# Patient Record
Sex: FEMALE | Race: WHITE | NOT HISPANIC OR LATINO | Employment: OTHER | URBAN - METROPOLITAN AREA
[De-identification: names, ages, dates, MRNs, and addresses within clinical notes are randomized per-mention and may not be internally consistent; named-entity substitution may affect disease eponyms.]

---

## 2023-04-05 LAB
ALBUMIN (G/DL) IN SER/PLAS: 3.7 G/DL (ref 3.4–5)
ANION GAP IN SER/PLAS: 12 MMOL/L (ref 10–20)
CALCIUM (MG/DL) IN SER/PLAS: 9.9 MG/DL (ref 8.6–10.3)
CARBON DIOXIDE, TOTAL (MMOL/L) IN SER/PLAS: 24 MMOL/L (ref 21–32)
CHLORIDE (MMOL/L) IN SER/PLAS: 103 MMOL/L (ref 98–107)
CREATININE (MG/DL) IN SER/PLAS: 2.45 MG/DL (ref 0.5–1.05)
ERYTHROCYTE DISTRIBUTION WIDTH (RATIO) BY AUTOMATED COUNT: 12.9 % (ref 11.5–14.5)
ERYTHROCYTE MEAN CORPUSCULAR HEMOGLOBIN CONCENTRATION (G/DL) BY AUTOMATED: 34.6 G/DL (ref 32–36)
ERYTHROCYTE MEAN CORPUSCULAR VOLUME (FL) BY AUTOMATED COUNT: 94 FL (ref 80–100)
ERYTHROCYTES (10*6/UL) IN BLOOD BY AUTOMATED COUNT: 3.55 X10E12/L (ref 4–5.2)
FERRITIN (UG/LL) IN SER/PLAS: 86 UG/L (ref 8–150)
GFR FEMALE: 20 ML/MIN/1.73M2
GLUCOSE (MG/DL) IN SER/PLAS: 94 MG/DL (ref 74–99)
HEMATOCRIT (%) IN BLOOD BY AUTOMATED COUNT: 33.2 % (ref 36–46)
HEMOGLOBIN (G/DL) IN BLOOD: 11.5 G/DL (ref 12–16)
IRON (UG/DL) IN SER/PLAS: 59 UG/DL (ref 35–150)
IRON BINDING CAPACITY (UG/DL) IN SER/PLAS: 320 UG/DL (ref 240–445)
IRON SATURATION (%) IN SER/PLAS: 18 % (ref 25–45)
LEUKOCYTES (10*3/UL) IN BLOOD BY AUTOMATED COUNT: 6.3 X10E9/L (ref 4.4–11.3)
PHOSPHATE (MG/DL) IN SER/PLAS: 3.1 MG/DL (ref 2.5–4.9)
PLATELETS (10*3/UL) IN BLOOD AUTOMATED COUNT: 183 X10E9/L (ref 150–450)
POTASSIUM (MMOL/L) IN SER/PLAS: 3.8 MMOL/L (ref 3.5–5.3)
SODIUM (MMOL/L) IN SER/PLAS: 135 MMOL/L (ref 136–145)
URATE (MG/DL) IN SER/PLAS: 4.1 MG/DL (ref 2.3–6.7)
UREA NITROGEN (MG/DL) IN SER/PLAS: 29 MG/DL (ref 6–23)

## 2023-04-13 LAB
ALBUMIN (MG/L) IN URINE: 9.7 MG/L
ALBUMIN/CREATININE (UG/MG) IN URINE: 12.2 UG/MG CRT (ref 0–30)
APPEARANCE, URINE: ABNORMAL
BACTERIA, URINE: ABNORMAL /HPF
BILIRUBIN, URINE: NEGATIVE
BLOOD, URINE: ABNORMAL
COLOR, URINE: YELLOW
CREATININE (MG/DL) IN URINE: 79.4 MG/DL (ref 20–320)
GLUCOSE, URINE: NEGATIVE MG/DL
KETONES, URINE: NEGATIVE MG/DL
LEUKOCYTE ESTERASE, URINE: ABNORMAL
MUCUS, URINE: ABNORMAL /LPF
NITRITE, URINE: POSITIVE
PH, URINE: 6 (ref 5–8)
PROTEIN, URINE: NEGATIVE MG/DL
RBC, URINE: 1 /HPF (ref 0–5)
SPECIFIC GRAVITY, URINE: 1.01 (ref 1–1.03)
SQUAMOUS EPITHELIAL CELLS, URINE: 5 /HPF
UROBILINOGEN, URINE: <2 MG/DL (ref 0–1.9)
WBC CLUMPS, URINE: ABNORMAL /HPF
WBC, URINE: 43 /HPF (ref 0–5)

## 2023-04-17 ENCOUNTER — PATIENT OUTREACH (OUTPATIENT)
Dept: PRIMARY CARE | Facility: CLINIC | Age: 78
End: 2023-04-17
Payer: MEDICARE

## 2023-04-17 DIAGNOSIS — L03.119 CELLULITIS OF LOWER EXTREMITY, UNSPECIFIED LATERALITY: ICD-10-CM

## 2023-04-17 DIAGNOSIS — N39.0 UTI (URINARY TRACT INFECTION), UNCOMPLICATED: ICD-10-CM

## 2023-04-17 RX ORDER — SULFAMETHOXAZOLE AND TRIMETHOPRIM 800; 160 MG/1; MG/1
1 TABLET ORAL 2 TIMES DAILY
COMMUNITY
Start: 2022-08-30 | End: 2023-04-20 | Stop reason: ALTCHOICE

## 2023-04-17 RX ORDER — FUROSEMIDE 20 MG/1
20 TABLET ORAL
COMMUNITY
Start: 2022-04-25 | End: 2024-02-26 | Stop reason: SDUPTHER

## 2023-04-17 NOTE — PROGRESS NOTES
Discharge Facility: [facility]   Discharge Diagnosis: L lower extremity cellulitis, UTI(1)   Stage 4 chronic kidney disease   Admission Date: 04/13/23  Discharge Date: 04/14/23    PCP appt: 04/20/23   Engagement  Call Start Time: 1248 (4/17/2023 12:48 PM)    Medications  Medications reviewed with patient/caregiver?: Yes (new/change only) (4/17/2023 12:48 PM)  Is the patient having any side effects they believe may be caused by any medication additions or changes?: No (4/17/2023 12:48 PM)  Does the patient have all medications ordered at discharge?: Yes (4/17/2023 12:48 PM)  Care Management Interventions: No intervention needed (4/17/2023 12:48 PM)  Is the patient taking all medications as directed (includes completed medication regime)?: Yes (4/17/2023 12:48 PM)    Appointments  Does the patient have a primary care provider?: Yes (4/17/2023 12:48 PM)  Care Management Interventions: Verified appointment date/time/provider (4/17/2023 12:48 PM)  Has the patient kept scheduled appointments due by today?: Yes (4/17/2023 12:48 PM)  Care Management Interventions: Advised patient to keep appointment (4/17/2023 12:48 PM)    Self Management  Has home health visited the patient within 72 hours of discharge?: Not applicable (4/17/2023 12:48 PM)    Patient Teaching  Does the patient have access to their discharge instructions?: Yes (4/17/2023 12:48 PM)  Care Management Interventions: Reviewed instructions with patient (4/17/2023 12:48 PM)  What is the patient's perception of their health status since discharge?: Improving (4/17/2023 12:48 PM)    Wrap Up  Call End Time: 1257 (4/17/2023 12:48 PM)

## 2023-04-20 ENCOUNTER — OFFICE VISIT (OUTPATIENT)
Dept: PRIMARY CARE | Facility: CLINIC | Age: 78
End: 2023-04-20
Payer: MEDICARE

## 2023-04-20 ENCOUNTER — PATIENT OUTREACH (OUTPATIENT)
Dept: PRIMARY CARE | Facility: CLINIC | Age: 78
End: 2023-04-20

## 2023-04-20 VITALS
HEART RATE: 65 BPM | RESPIRATION RATE: 18 BRPM | WEIGHT: 240 LBS | BODY MASS INDEX: 38.57 KG/M2 | OXYGEN SATURATION: 98 % | DIASTOLIC BLOOD PRESSURE: 60 MMHG | HEIGHT: 66 IN | SYSTOLIC BLOOD PRESSURE: 106 MMHG

## 2023-04-20 DIAGNOSIS — J44.1 CHRONIC OBSTRUCTIVE ASTHMA WITH ACUTE EXACERBATION (MULTI): ICD-10-CM

## 2023-04-20 DIAGNOSIS — Z99.2 ESRD (END STAGE RENAL DISEASE) ON DIALYSIS (MULTI): ICD-10-CM

## 2023-04-20 DIAGNOSIS — I12.9 BENIGN HYPERTENSION WITH CKD (CHRONIC KIDNEY DISEASE) STAGE IV (MULTI): ICD-10-CM

## 2023-04-20 DIAGNOSIS — Z09 HOSPITAL DISCHARGE FOLLOW-UP: ICD-10-CM

## 2023-04-20 DIAGNOSIS — N18.4 BENIGN HYPERTENSION WITH CKD (CHRONIC KIDNEY DISEASE) STAGE IV (MULTI): ICD-10-CM

## 2023-04-20 DIAGNOSIS — N32.81 OAB (OVERACTIVE BLADDER): ICD-10-CM

## 2023-04-20 DIAGNOSIS — I10 PRIMARY HYPERTENSION: Primary | ICD-10-CM

## 2023-04-20 DIAGNOSIS — J45.909 UNCOMPLICATED ASTHMA, UNSPECIFIED ASTHMA SEVERITY, UNSPECIFIED WHETHER PERSISTENT (HHS-HCC): ICD-10-CM

## 2023-04-20 DIAGNOSIS — J45.901 CHRONIC OBSTRUCTIVE ASTHMA WITH ACUTE EXACERBATION (MULTI): ICD-10-CM

## 2023-04-20 DIAGNOSIS — I73.9 PAD (PERIPHERAL ARTERY DISEASE) (CMS-HCC): ICD-10-CM

## 2023-04-20 DIAGNOSIS — B37.0 THRUSH, ORAL: ICD-10-CM

## 2023-04-20 DIAGNOSIS — N18.6 ESRD (END STAGE RENAL DISEASE) ON DIALYSIS (MULTI): ICD-10-CM

## 2023-04-20 PROBLEM — M10.9 URIC ACID ARTHROPATHY: Status: ACTIVE | Noted: 2023-04-20

## 2023-04-20 PROBLEM — B35.1 MYCOTIC TOENAILS: Status: ACTIVE | Noted: 2023-04-20

## 2023-04-20 PROBLEM — R39.9 LOWER URINARY TRACT SYMPTOMS: Status: RESOLVED | Noted: 2023-04-20 | Resolved: 2023-04-20

## 2023-04-20 PROBLEM — M17.11 PRIMARY OSTEOARTHRITIS OF RIGHT KNEE: Status: RESOLVED | Noted: 2023-04-20 | Resolved: 2023-04-20

## 2023-04-20 PROBLEM — E78.01 FAMILIAL HYPERCHOLESTEREMIA: Status: RESOLVED | Noted: 2023-04-20 | Resolved: 2023-04-20

## 2023-04-20 PROBLEM — M25.561 RIGHT KNEE PAIN: Status: RESOLVED | Noted: 2023-04-20 | Resolved: 2023-04-20

## 2023-04-20 PROBLEM — M79.604 BILATERAL LEG AND FOOT PAIN: Status: RESOLVED | Noted: 2023-04-20 | Resolved: 2023-04-20

## 2023-04-20 PROBLEM — E66.01 MORBID OBESITY (MULTI): Status: RESOLVED | Noted: 2023-04-20 | Resolved: 2023-04-20

## 2023-04-20 PROBLEM — K21.9 GERD (GASTROESOPHAGEAL REFLUX DISEASE): Status: RESOLVED | Noted: 2023-04-20 | Resolved: 2023-04-20

## 2023-04-20 PROBLEM — H69.93 DISORDER OF BOTH EUSTACHIAN TUBES: Status: RESOLVED | Noted: 2023-04-20 | Resolved: 2023-04-20

## 2023-04-20 PROBLEM — M79.605 BILATERAL LEG AND FOOT PAIN: Status: RESOLVED | Noted: 2023-04-20 | Resolved: 2023-04-20

## 2023-04-20 PROBLEM — M79.671 BILATERAL LEG AND FOOT PAIN: Status: RESOLVED | Noted: 2023-04-20 | Resolved: 2023-04-20

## 2023-04-20 PROBLEM — E03.9 HYPOTHYROID: Status: RESOLVED | Noted: 2023-04-20 | Resolved: 2023-04-20

## 2023-04-20 PROBLEM — N18.9 CKD (CHRONIC KIDNEY DISEASE): Status: ACTIVE | Noted: 2023-04-20

## 2023-04-20 PROBLEM — T39.395S: Status: RESOLVED | Noted: 2023-04-20 | Resolved: 2023-04-20

## 2023-04-20 PROBLEM — M79.672 BILATERAL LEG AND FOOT PAIN: Status: RESOLVED | Noted: 2023-04-20 | Resolved: 2023-04-20

## 2023-04-20 PROBLEM — N39.0 ACUTE UTI: Status: RESOLVED | Noted: 2023-04-20 | Resolved: 2023-04-20

## 2023-04-20 PROCEDURE — 1159F MED LIST DOCD IN RCRD: CPT | Performed by: NURSE PRACTITIONER

## 2023-04-20 PROCEDURE — 3074F SYST BP LT 130 MM HG: CPT | Performed by: NURSE PRACTITIONER

## 2023-04-20 PROCEDURE — 99496 TRANSJ CARE MGMT HIGH F2F 7D: CPT | Performed by: NURSE PRACTITIONER

## 2023-04-20 PROCEDURE — 3078F DIAST BP <80 MM HG: CPT | Performed by: NURSE PRACTITIONER

## 2023-04-20 PROCEDURE — 1160F RVW MEDS BY RX/DR IN RCRD: CPT | Performed by: NURSE PRACTITIONER

## 2023-04-20 PROCEDURE — 1036F TOBACCO NON-USER: CPT | Performed by: NURSE PRACTITIONER

## 2023-04-20 RX ORDER — ATENOLOL 100 MG/1
100 TABLET ORAL DAILY
Qty: 90 TABLET | Refills: 3 | Status: ON HOLD | OUTPATIENT
Start: 2023-04-20 | End: 2024-04-12

## 2023-04-20 RX ORDER — ALBUTEROL SULFATE 90 UG/1
2 AEROSOL, METERED RESPIRATORY (INHALATION) DAILY PRN
Qty: 18 G | Refills: 3 | Status: SHIPPED | OUTPATIENT
Start: 2023-04-20 | End: 2024-04-13 | Stop reason: HOSPADM

## 2023-04-20 RX ORDER — FEBUXOSTAT 40 MG/1
40 TABLET, FILM COATED ORAL EVERY EVENING
COMMUNITY

## 2023-04-20 RX ORDER — FLUCONAZOLE 100 MG/1
100 TABLET ORAL DAILY
Qty: 7 TABLET | Refills: 0 | Status: SHIPPED | OUTPATIENT
Start: 2023-04-20 | End: 2023-04-27

## 2023-04-20 RX ORDER — ATORVASTATIN CALCIUM 20 MG/1
20 TABLET, FILM COATED ORAL DAILY
COMMUNITY
Start: 2013-07-09 | End: 2023-07-12

## 2023-04-20 RX ORDER — FERROUS GLUCONATE 324(38)MG
1 TABLET ORAL 2 TIMES DAILY
Status: ON HOLD | COMMUNITY
Start: 2023-04-13 | End: 2024-04-05

## 2023-04-20 RX ORDER — ERGOCALCIFEROL 1.25 MG/1
1 CAPSULE ORAL WEEKLY
COMMUNITY
Start: 2021-08-25 | End: 2024-01-04 | Stop reason: SDUPTHER

## 2023-04-20 RX ORDER — OXYBUTYNIN CHLORIDE 5 MG/1
1 TABLET ORAL 2 TIMES DAILY
COMMUNITY
Start: 2021-07-21 | End: 2023-04-20 | Stop reason: SDUPTHER

## 2023-04-20 RX ORDER — FAMOTIDINE 20 MG/1
20 TABLET, FILM COATED ORAL NIGHTLY
COMMUNITY
End: 2023-11-08 | Stop reason: SDUPTHER

## 2023-04-20 RX ORDER — OXYBUTYNIN CHLORIDE 5 MG/1
5 TABLET ORAL 2 TIMES DAILY
Qty: 180 TABLET | Refills: 3 | Status: SHIPPED | OUTPATIENT
Start: 2023-04-20 | End: 2023-11-08 | Stop reason: ALTCHOICE

## 2023-04-20 RX ORDER — ATENOLOL 100 MG/1
100 TABLET ORAL DAILY
COMMUNITY
Start: 2013-07-09 | End: 2023-04-20 | Stop reason: SDUPTHER

## 2023-04-20 RX ORDER — BUDESONIDE AND FORMOTEROL FUMARATE DIHYDRATE 160; 4.5 UG/1; UG/1
2 AEROSOL RESPIRATORY (INHALATION) DAILY
Qty: 1 EACH | Refills: 3 | Status: SHIPPED | OUTPATIENT
Start: 2023-04-20

## 2023-04-20 RX ORDER — BUDESONIDE AND FORMOTEROL FUMARATE DIHYDRATE 160; 4.5 UG/1; UG/1
2 AEROSOL RESPIRATORY (INHALATION)
COMMUNITY
End: 2023-04-20 | Stop reason: SDUPTHER

## 2023-04-20 RX ORDER — ALBUTEROL SULFATE 90 UG/1
2 AEROSOL, METERED RESPIRATORY (INHALATION) DAILY PRN
COMMUNITY
End: 2023-04-20 | Stop reason: SDUPTHER

## 2023-04-20 RX ORDER — LEVOTHYROXINE SODIUM 75 UG/1
1 TABLET ORAL DAILY
COMMUNITY
Start: 2016-10-10 | End: 2023-06-15 | Stop reason: SDUPTHER

## 2023-04-20 NOTE — PROGRESS NOTES
"Subjective   Patient ID: Magi Boyd is a 78 y.o. female who presents for Hospital Follow-up (Patient in today for Hospital F/U, states she had infection in her leg and UTI. She is currently on Bactrim which she believes is causing her diarrhea.).    Following up for admission to [facility] for E cellulitis and UTI.  Was at nephrology and had swelling and redness to LLE.  RLE was normal.  Recommended to go to ER.  Negative for DVT  She was kept for observation due to her inability to walk and they needed to give her increased furosemide  Had clindamycin and rocephin  Was treated for UTI  Has been taking bactrim - last dose tonight       Needs hospital bed at home - has difficulty getting into bed.  Has to sleep with the head of the bed elevated due to her breathing.  She is unable to turn herself over in bed due to her weak legs and pain in her knees - will need a trapeze         Review of Systems   All other systems reviewed and are negative.      Objective   /60   Pulse 65   Resp 18   Ht 1.676 m (5' 6\")   Wt 109 kg (240 lb)   SpO2 98%   BMI 38.74 kg/m²     Physical Exam  Vitals reviewed.   Constitutional:       General: She is not in acute distress.     Appearance: Normal appearance. She is obese.   HENT:      Head: Normocephalic and atraumatic.      Right Ear: External ear normal.      Left Ear: External ear normal.      Nose: Nose normal.      Mouth/Throat:      Mouth: Mucous membranes are moist.      Pharynx: Oropharynx is clear.   Eyes:      Extraocular Movements: Extraocular movements intact.      Conjunctiva/sclera: Conjunctivae normal.      Pupils: Pupils are equal, round, and reactive to light.   Neck:      Vascular: No carotid bruit.   Cardiovascular:      Rate and Rhythm: Normal rate and regular rhythm.      Pulses: Normal pulses.      Heart sounds: Normal heart sounds.   Pulmonary:      Effort: Pulmonary effort is normal.      Breath sounds: Normal breath sounds.   Abdominal:      " General: Bowel sounds are normal.      Palpations: Abdomen is soft.      Tenderness: There is no right CVA tenderness or left CVA tenderness.   Musculoskeletal:      Cervical back: Normal range of motion.      Right lower leg: Edema (1+) present.      Left lower leg: Edema (1+) present.   Skin:     General: Skin is warm.      Capillary Refill: Capillary refill takes less than 2 seconds.   Neurological:      General: No focal deficit present.      Mental Status: She is alert and oriented to person, place, and time. Mental status is at baseline.   Psychiatric:         Mood and Affect: Mood normal.         Behavior: Behavior normal.         Thought Content: Thought content normal.         Judgment: Judgment normal.         Assessment/Plan   Problem List Items Addressed This Visit          Respiratory    Asthma    Relevant Medications    budesonide-formoteroL (Symbicort) 160-4.5 mcg/actuation inhaler    albuterol 90 mcg/actuation inhaler     Other Visit Diagnoses       Primary hypertension    -  Primary    Relevant Medications    atenolol (Tenormin) 100 mg tablet    Thrush, oral        Relevant Medications    fluconazole (Diflucan) 100 mg tablet    OAB (overactive bladder)        Relevant Medications    oxybutynin (Ditropan) 5 mg tablet

## 2023-04-20 NOTE — PROGRESS NOTES
Call regarding appt. with PCP on 4/20/23 after hospitalization.  At time of outreach call the patient feels as if their condition has improved since last outreach call.  Reviewed with patient the PCP appointment and any questions or concerns regarding the appt.

## 2023-04-21 ASSESSMENT — ENCOUNTER SYMPTOMS
OCCASIONAL FEELINGS OF UNSTEADINESS: 1
LOSS OF SENSATION IN FEET: 0

## 2023-04-21 NOTE — PROGRESS NOTES
"Patient: Magi Boyd  : 1945  PCP: Ofe Hunter, APRN-CNP  MRN: [id number]  Program: No linked episodes     Maig Boyd is a 78 y.o. female presenting today for follow-up after being discharged from the hospital 6 days ago. The main problem requiring admission was lower extremity edema, redness and pain.  Possible DVT ruled out.  Positive for cellulitis and UTI. Has had white painful rash in mouth x 2 days The discharge summary and/or Transitional Care Management documentation was reviewed. Medication reconciliation was performed as indicated via the \"Sebastian as Reviewed\" timestamp.     Magi Boyd was contacted by Transitional Care Management services two days after her discharge. This encounter and supporting documentation was reviewed.    The complexity of medical decision making for this patient's transitional care is high.    Physical Exam  Vitals reviewed.   Constitutional:       Appearance: Normal appearance. She is obese.   HENT:      Head: Normocephalic.      Right Ear: External ear normal.      Left Ear: External ear normal.      Mouth/Throat:      Mouth: Mucous membranes are moist.      Comments: thrush  Eyes:      Extraocular Movements: Extraocular movements intact.      Conjunctiva/sclera: Conjunctivae normal.      Pupils: Pupils are equal, round, and reactive to light.   Neck:      Vascular: No carotid bruit.   Cardiovascular:      Rate and Rhythm: Normal rate and regular rhythm.      Pulses: Normal pulses.      Heart sounds: Normal heart sounds.   Pulmonary:      Effort: Pulmonary effort is normal.      Breath sounds: Normal breath sounds.   Abdominal:      General: Bowel sounds are normal.   Musculoskeletal:      Right lower leg: No edema.      Left lower leg: No edema.   Lymphadenopathy:      Cervical: No cervical adenopathy.   Skin:     General: Skin is warm.      Comments: BLE red     Neurological:      General: No focal deficit present.      Mental Status: She is " alert and oriented to person, place, and time. Mental status is at baseline.   Psychiatric:         Mood and Affect: Mood normal.         Behavior: Behavior normal.         Thought Content: Thought content normal.         Judgment: Judgment normal.         Assessment/Plan   Problem List Items Addressed This Visit          Respiratory    Asthma    Relevant Medications    budesonide-formoteroL (Symbicort) 160-4.5 mcg/actuation inhaler    albuterol 90 mcg/actuation inhaler    Chronic obstructive asthma with acute exacerbation (CMS/[facility])     Following nephrology  Continue furosemide  Continue atenolol  Follow up in 4 months            Circulatory    Benign hypertension with CKD (chronic kidney disease) stage IV (CMS/[facility])     Following nephrology  Continue furosemide  Continue atenolol  Follow up in 4 months         Primary hypertension - Primary    Relevant Medications    atenolol (Tenormin) 100 mg tablet    PAD (peripheral artery disease) (CMS/[facility])     Follows vascular medicine  stable            Genitourinary    ESRD (end stage renal disease) on dialysis (CMS/[facility])     Following nephrology            Infectious/Inflammatory    Thrush, oral     Thrush due to IV antibiotics and oral antibiotics  Fluconazole 100 mg daily x 1 week            Other    Hospital discharge follow-up     Reviewed hospital notes   Reviewed UA  Completed 5 days of keflex - will have her complete 5 more as she was febrile and had large leuco  Follow up with nephrology as sheduled          Other Visit Diagnoses       OAB (overactive bladder)        Relevant Medications    oxybutynin (Ditropan) 5 mg tablet            Review of Systems   All other systems reviewed and are negative.      No family history on file.    Engagement  Call Start Time: 1248 (4/17/2023 12:48 PM)    Medications  Medications reviewed with patient/caregiver?: Yes (new/change only) (4/17/2023 12:48 PM)  Is the patient having any side effects they believe may be caused by any  medication additions or changes?: No (4/17/2023 12:48 PM)  Does the patient have all medications ordered at discharge?: Yes (4/17/2023 12:48 PM)  Care Management Interventions: No intervention needed (4/17/2023 12:48 PM)  Is the patient taking all medications as directed (includes completed medication regime)?: Yes (4/17/2023 12:48 PM)    Appointments  Does the patient have a primary care provider?: Yes (4/17/2023 12:48 PM)  Care Management Interventions: Verified appointment date/time/provider (4/17/2023 12:48 PM)  Has the patient kept scheduled appointments due by today?: Yes (4/17/2023 12:48 PM)  Care Management Interventions: Advised patient to keep appointment (4/17/2023 12:48 PM)    Self Management  Has home health visited the patient within 72 hours of discharge?: Not applicable (4/17/2023 12:48 PM)    Patient Teaching  Does the patient have access to their discharge instructions?: Yes (4/17/2023 12:48 PM)  Care Management Interventions: Reviewed instructions with patient (4/17/2023 12:48 PM)  What is the patient's perception of their health status since discharge?: Improving (4/17/2023 12:48 PM)    Wrap Up  Call End Time: 1257 (4/17/2023 12:48 PM)        No follow-ups on file.

## 2023-04-30 PROBLEM — N18.9 CKD (CHRONIC KIDNEY DISEASE): Status: RESOLVED | Noted: 2023-04-20 | Resolved: 2023-04-30

## 2023-04-30 PROBLEM — I10 PRIMARY HYPERTENSION: Status: ACTIVE | Noted: 2023-04-20

## 2023-04-30 PROBLEM — B37.0 THRUSH, ORAL: Status: ACTIVE | Noted: 2023-04-30

## 2023-04-30 PROBLEM — Z09 HOSPITAL DISCHARGE FOLLOW-UP: Status: ACTIVE | Noted: 2023-04-30

## 2023-05-01 NOTE — ASSESSMENT & PLAN NOTE
Reviewed hospital notes   Reviewed UA  Completed 5 days of keflex - will have her complete 5 more as she was febrile and had large leuco  Follow up with nephrology as sheduled

## 2023-05-16 ENCOUNTER — PATIENT OUTREACH (OUTPATIENT)
Dept: PRIMARY CARE | Facility: CLINIC | Age: 78
End: 2023-05-16
Payer: MEDICARE

## 2023-06-01 ENCOUNTER — TELEMEDICINE (OUTPATIENT)
Dept: PRIMARY CARE | Facility: CLINIC | Age: 78
End: 2023-06-01
Payer: MEDICARE

## 2023-06-01 ENCOUNTER — LAB (OUTPATIENT)
Dept: LAB | Facility: LAB | Age: 78
End: 2023-06-01
Payer: MEDICARE

## 2023-06-01 DIAGNOSIS — R30.0 DYSURIA: Primary | ICD-10-CM

## 2023-06-01 DIAGNOSIS — R30.0 DYSURIA: ICD-10-CM

## 2023-06-01 PROCEDURE — 87086 URINE CULTURE/COLONY COUNT: CPT

## 2023-06-01 PROCEDURE — 87186 SC STD MICRODIL/AGAR DIL: CPT

## 2023-06-01 PROCEDURE — 87077 CULTURE AEROBIC IDENTIFY: CPT

## 2023-06-01 PROCEDURE — 99441 PR PHYS/QHP TELEPHONE EVALUATION 5-10 MIN: CPT | Performed by: NURSE PRACTITIONER

## 2023-06-01 PROCEDURE — 81001 URINALYSIS AUTO W/SCOPE: CPT

## 2023-06-01 NOTE — PROGRESS NOTES
Magi Boyd is a 78 y.o. female who is seen via telehealth today for c/o possible UTI    Chief Complaint   Patient presents with    urinary pressure       Symptoms URINARY PRESSURE AND BODY CHILLS     Symptom onset has been acute for a time period of 5 day(s).     Severity is described as mild-moderate.     Course of her symptoms over time is acute.    Pt states she was treated for UTI in April; completed Rx Bactrim as prescribed    Review of Systems  All 13 systems were reviewed and are within normal limits except positive and pertinent negative responses which are noted below or in HPI.      Objective   Vitals:  There were no vitals taken for this visit.        Physical Exam    Assessment/Plan   Problem List Items Addressed This Visit    None  Visit Diagnoses       Dysuria    -  Primary    Relevant Orders    Urinalysis with Reflex Microscopic    Urine Culture

## 2023-06-01 NOTE — PATIENT INSTRUCTIONS
Thank you for seeing me today.  It was a pleasure to meet you!    #URINARY PRESSURE  Please go to  lab and give urine specimen  Will call with results     RTC AS PER PCP

## 2023-06-02 ENCOUNTER — TREATMENT (OUTPATIENT)
Dept: PRIMARY CARE | Facility: CLINIC | Age: 78
End: 2023-06-02
Payer: MEDICARE

## 2023-06-02 DIAGNOSIS — N39.0 UTI (URINARY TRACT INFECTION), UNCOMPLICATED: Primary | ICD-10-CM

## 2023-06-02 LAB
APPEARANCE, URINE: CLEAR
BACTERIA, URINE: ABNORMAL /HPF
BILIRUBIN, URINE: NEGATIVE
BLOOD, URINE: NEGATIVE
COLOR, URINE: ABNORMAL
GLUCOSE, URINE: NEGATIVE MG/DL
KETONES, URINE: NEGATIVE MG/DL
LEUKOCYTE ESTERASE, URINE: ABNORMAL
NITRITE, URINE: NEGATIVE
PH, URINE: 6 (ref 5–8)
PROTEIN, URINE: NEGATIVE MG/DL
RBC, URINE: 1 /HPF (ref 0–5)
SPECIFIC GRAVITY, URINE: 1.01 (ref 1–1.03)
SQUAMOUS EPITHELIAL CELLS, URINE: <1 /HPF
UROBILINOGEN, URINE: <2 MG/DL (ref 0–1.9)
WBC, URINE: 18 /HPF (ref 0–5)

## 2023-06-02 RX ORDER — SULFAMETHOXAZOLE AND TRIMETHOPRIM 800; 160 MG/1; MG/1
1 TABLET ORAL 2 TIMES DAILY
Qty: 10 TABLET | Refills: 0 | Status: SHIPPED | OUTPATIENT
Start: 2023-06-02 | End: 2023-06-05

## 2023-06-04 LAB — URINE CULTURE: ABNORMAL

## 2023-06-05 ENCOUNTER — TELEPHONE (OUTPATIENT)
Dept: PRIMARY CARE | Facility: CLINIC | Age: 78
End: 2023-06-05
Payer: MEDICARE

## 2023-06-05 ENCOUNTER — TREATMENT (OUTPATIENT)
Dept: PRIMARY CARE | Facility: CLINIC | Age: 78
End: 2023-06-05
Payer: MEDICARE

## 2023-06-05 DIAGNOSIS — B96.29 UTI DUE TO EXTENDED-SPECTRUM BETA LACTAMASE (ESBL) PRODUCING ESCHERICHIA COLI: Primary | ICD-10-CM

## 2023-06-05 DIAGNOSIS — Z16.12 UTI DUE TO EXTENDED-SPECTRUM BETA LACTAMASE (ESBL) PRODUCING ESCHERICHIA COLI: Primary | ICD-10-CM

## 2023-06-05 DIAGNOSIS — N39.0 UTI DUE TO EXTENDED-SPECTRUM BETA LACTAMASE (ESBL) PRODUCING ESCHERICHIA COLI: Primary | ICD-10-CM

## 2023-06-05 RX ORDER — GRANULES FOR ORAL 3 G/1
POWDER ORAL
Qty: 30 G | Refills: 0 | Status: SHIPPED | OUTPATIENT
Start: 2023-06-05 | End: 2023-06-08

## 2023-06-05 NOTE — TELEPHONE ENCOUNTER
Called Magi and let her know Nasrin sent in new prescription for UTI and she can stop taking the one she has been since will not work. Patient understands.

## 2023-06-08 ENCOUNTER — TREATMENT (OUTPATIENT)
Dept: PRIMARY CARE | Facility: CLINIC | Age: 78
End: 2023-06-08
Payer: MEDICARE

## 2023-06-08 DIAGNOSIS — N30.00 ACUTE CYSTITIS WITHOUT HEMATURIA: Primary | ICD-10-CM

## 2023-06-08 RX ORDER — LINEZOLID 600 MG/1
600 TABLET, FILM COATED ORAL 2 TIMES DAILY
Qty: 10 TABLET | Refills: 0 | Status: SHIPPED | OUTPATIENT
Start: 2023-06-08 | End: 2023-06-13

## 2023-06-15 DIAGNOSIS — E03.9 HYPOTHYROIDISM, UNSPECIFIED TYPE: ICD-10-CM

## 2023-06-15 RX ORDER — LEVOTHYROXINE SODIUM 75 UG/1
75 TABLET ORAL DAILY
Qty: 90 TABLET | Refills: 1 | Status: SHIPPED | OUTPATIENT
Start: 2023-06-15 | End: 2023-12-04

## 2023-06-28 ENCOUNTER — OFFICE VISIT (OUTPATIENT)
Dept: PRIMARY CARE | Facility: CLINIC | Age: 78
End: 2023-06-28
Payer: MEDICARE

## 2023-06-28 VITALS
DIASTOLIC BLOOD PRESSURE: 78 MMHG | WEIGHT: 242.2 LBS | HEART RATE: 61 BPM | RESPIRATION RATE: 18 BRPM | OXYGEN SATURATION: 96 % | SYSTOLIC BLOOD PRESSURE: 128 MMHG | HEIGHT: 66 IN | BODY MASS INDEX: 38.92 KG/M2

## 2023-06-28 DIAGNOSIS — R82.998 URINE LEUKOCYTES: ICD-10-CM

## 2023-06-28 DIAGNOSIS — N39.0 UTI (URINARY TRACT INFECTION), UNCOMPLICATED: ICD-10-CM

## 2023-06-28 DIAGNOSIS — R39.89 BLADDER PAIN: Primary | ICD-10-CM

## 2023-06-28 DIAGNOSIS — N95.2 POST-MENOPAUSAL ATROPHIC VAGINITIS: ICD-10-CM

## 2023-06-28 LAB
POC APPEARANCE, URINE: ABNORMAL
POC BILIRUBIN, URINE: NEGATIVE
POC BLOOD, URINE: NEGATIVE
POC COLOR, URINE: YELLOW
POC GLUCOSE, URINE: NEGATIVE MG/DL
POC KETONES, URINE: NEGATIVE MG/DL
POC LEUKOCYTES, URINE: ABNORMAL
POC NITRITE,URINE: NEGATIVE
POC PH, URINE: 5.5 PH
POC PROTEIN, URINE: NEGATIVE MG/DL
POC SPECIFIC GRAVITY, URINE: 1.01
POC UROBILINOGEN, URINE: 0.2 EU/DL

## 2023-06-28 PROCEDURE — 1159F MED LIST DOCD IN RCRD: CPT

## 2023-06-28 PROCEDURE — 99213 OFFICE O/P EST LOW 20 MIN: CPT

## 2023-06-28 PROCEDURE — 3078F DIAST BP <80 MM HG: CPT

## 2023-06-28 PROCEDURE — 3074F SYST BP LT 130 MM HG: CPT

## 2023-06-28 PROCEDURE — 87186 SC STD MICRODIL/AGAR DIL: CPT

## 2023-06-28 PROCEDURE — 1160F RVW MEDS BY RX/DR IN RCRD: CPT

## 2023-06-28 PROCEDURE — 81003 URINALYSIS AUTO W/O SCOPE: CPT

## 2023-06-28 PROCEDURE — 87086 URINE CULTURE/COLONY COUNT: CPT

## 2023-06-28 PROCEDURE — 87077 CULTURE AEROBIC IDENTIFY: CPT

## 2023-06-28 PROCEDURE — 1036F TOBACCO NON-USER: CPT

## 2023-06-28 RX ORDER — ESTRADIOL 0.1 MG/G
2 CREAM VAGINAL DAILY
Qty: 42.5 G | Refills: 5 | Status: SHIPPED | OUTPATIENT
Start: 2023-06-28 | End: 2024-06-27

## 2023-06-28 RX ORDER — SULFAMETHOXAZOLE AND TRIMETHOPRIM 800; 160 MG/1; MG/1
1 TABLET ORAL 2 TIMES DAILY
Qty: 10 TABLET | Refills: 0 | Status: SHIPPED | OUTPATIENT
Start: 2023-06-28 | End: 2023-07-03

## 2023-06-28 ASSESSMENT — ENCOUNTER SYMPTOMS
NAUSEA: 0
HEMATURIA: 0
FREQUENCY: 1
CHILLS: 0
SWEATS: 0
FLANK PAIN: 0
VOMITING: 0

## 2023-06-28 NOTE — PATIENT INSTRUCTIONS
There is a supplement D-Mannose that can provide better urinary tract health.  I suggest taking 2 grams per day.  You can get this supplement at most pharmacies or [facility].  You can choose to take this in the form of a powder or capsule which ever is your preference.

## 2023-06-28 NOTE — PROGRESS NOTES
"Subjective   Patient ID: Magi Boyd is a 78 y.o. female who presents for Follow-up (Patient in today with concerns of ongoing UTI sx, states she is experiencing a lot of abdominal pressure and uncontrollable bladder leakage. She had a virtual visit and was given antibiotics that only temp helped with her sx. ).    UTI   This is a recurrent problem. The current episode started in the past 7 days. The problem occurs every urination. The problem has been waxing and waning. The quality of the pain is described as aching. There has been no fever. She is Not sexually active. There is No history of pyelonephritis. Associated symptoms include frequency and hesitancy. Pertinent negatives include no chills, discharge, flank pain, hematuria, nausea, possible pregnancy, sweats, urgency or vomiting. Associated symptoms comments: Fullness  . She has tried nothing for the symptoms. There is no history of catheterization, kidney stones, recurrent UTIs, a single kidney, urinary stasis or a urological procedure.        Review of Systems   Constitutional:  Negative for chills.   Gastrointestinal:  Negative for nausea and vomiting.   Genitourinary:  Positive for frequency and hesitancy. Negative for flank pain, hematuria and urgency.       Objective   /78   Pulse 61   Resp 18   Ht 1.676 m (5' 6\")   Wt 110 kg (242 lb 3.2 oz)   SpO2 96%   BMI 39.09 kg/m²     Physical Exam  Constitutional:       Appearance: Normal appearance.   Cardiovascular:      Heart sounds: Normal heart sounds.   Pulmonary:      Effort: Pulmonary effort is normal.      Breath sounds: Normal breath sounds.   Abdominal:      General: Bowel sounds are normal.      Tenderness: There is no abdominal tenderness. There is no right CVA tenderness or left CVA tenderness.   Musculoskeletal:      Right lower leg: Edema present.      Left lower leg: Edema present.   Neurological:      Mental Status: She is alert and oriented to person, place, and time. "         Assessment/Plan   Problem List Items Addressed This Visit    None  Visit Diagnoses       Bladder pain    -  Primary    Relevant Orders    POCT UA Automated manually resulted (Completed)    Urine leukocytes        Relevant Medications    sulfamethoxazole-trimethoprim (Bactrim DS) 800-160 mg tablet    Other Relevant Orders    Urine Culture    UTI (urinary tract infection), uncomplicated        Relevant Medications    sulfamethoxazole-trimethoprim (Bactrim DS) 800-160 mg tablet    Post-menopausal atrophic vaginitis        Relevant Medications    estradiol (Estrace) 0.01 % (0.1 mg/gram) vaginal cream

## 2023-07-03 LAB — URINE CULTURE: ABNORMAL

## 2023-07-05 RX ORDER — CEPHALEXIN 500 MG/1
500 CAPSULE ORAL 2 TIMES DAILY
Qty: 14 CAPSULE | Refills: 0 | Status: SHIPPED | OUTPATIENT
Start: 2023-07-05 | End: 2023-07-12

## 2023-07-12 DIAGNOSIS — E78.2 MIXED HYPERLIPIDEMIA: Primary | ICD-10-CM

## 2023-07-12 RX ORDER — ATORVASTATIN CALCIUM 20 MG/1
TABLET, FILM COATED ORAL
Qty: 90 TABLET | Refills: 0 | Status: SHIPPED | OUTPATIENT
Start: 2023-07-12 | End: 2023-10-25 | Stop reason: SDUPTHER

## 2023-07-13 LAB
ALBUMIN (G/DL) IN SER/PLAS: 3.9 G/DL (ref 3.4–5)
ANION GAP IN SER/PLAS: 12 MMOL/L (ref 10–20)
CALCIUM (MG/DL) IN SER/PLAS: 9.6 MG/DL (ref 8.6–10.3)
CARBON DIOXIDE, TOTAL (MMOL/L) IN SER/PLAS: 25 MMOL/L (ref 21–32)
CHLORIDE (MMOL/L) IN SER/PLAS: 104 MMOL/L (ref 98–107)
CREATININE (MG/DL) IN SER/PLAS: 2.86 MG/DL (ref 0.5–1.05)
ERYTHROCYTE DISTRIBUTION WIDTH (RATIO) BY AUTOMATED COUNT: 14.6 % (ref 11.5–14.5)
ERYTHROCYTE MEAN CORPUSCULAR HEMOGLOBIN CONCENTRATION (G/DL) BY AUTOMATED: 34.4 G/DL (ref 32–36)
ERYTHROCYTE MEAN CORPUSCULAR VOLUME (FL) BY AUTOMATED COUNT: 98 FL (ref 80–100)
ERYTHROCYTES (10*6/UL) IN BLOOD BY AUTOMATED COUNT: 3.15 X10E12/L (ref 4–5.2)
FERRITIN (UG/LL) IN SER/PLAS: 103 UG/L (ref 8–150)
GFR FEMALE: 17 ML/MIN/1.73M2
GLUCOSE (MG/DL) IN SER/PLAS: 93 MG/DL (ref 74–99)
HEMATOCRIT (%) IN BLOOD BY AUTOMATED COUNT: 30.8 % (ref 36–46)
HEMOGLOBIN (G/DL) IN BLOOD: 10.6 G/DL (ref 12–16)
IRON (UG/DL) IN SER/PLAS: 60 UG/DL (ref 35–150)
IRON BINDING CAPACITY (UG/DL) IN SER/PLAS: 348 UG/DL (ref 240–445)
IRON SATURATION (%) IN SER/PLAS: 17 % (ref 25–45)
LEUKOCYTES (10*3/UL) IN BLOOD BY AUTOMATED COUNT: 4.9 X10E9/L (ref 4.4–11.3)
PHOSPHATE (MG/DL) IN SER/PLAS: 4 MG/DL (ref 2.5–4.9)
PLATELETS (10*3/UL) IN BLOOD AUTOMATED COUNT: 159 X10E9/L (ref 150–450)
POTASSIUM (MMOL/L) IN SER/PLAS: 4.1 MMOL/L (ref 3.5–5.3)
SODIUM (MMOL/L) IN SER/PLAS: 137 MMOL/L (ref 136–145)
URATE (MG/DL) IN SER/PLAS: 3.6 MG/DL (ref 2.3–6.7)
UREA NITROGEN (MG/DL) IN SER/PLAS: 40 MG/DL (ref 6–23)

## 2023-07-14 LAB
ALBUMIN (MG/L) IN URINE: <7 MG/L
ALBUMIN/CREATININE (UG/MG) IN URINE: NORMAL UG/MG CRT (ref 0–30)
CALCIDIOL (25 OH VITAMIN D3) (NG/ML) IN SER/PLAS: 73 NG/ML
CREATININE (MG/DL) IN URINE: 60 MG/DL (ref 20–320)

## 2023-07-28 ENCOUNTER — PATIENT OUTREACH (OUTPATIENT)
Dept: PRIMARY CARE | Facility: CLINIC | Age: 78
End: 2023-07-28
Payer: MEDICARE

## 2023-07-28 DIAGNOSIS — R39.89 BLADDER PAIN: ICD-10-CM

## 2023-07-28 NOTE — PROGRESS NOTES
Unsuccessful outreach to this patient for the 90 day post discharge outreach. Left a voice-mail message including my direct call back number for the patient to call regarding any questions or concerns.  Patient has met target of no readmissions for 90 days and has graduated from [facility]

## 2023-08-08 ENCOUNTER — HOSPITAL ENCOUNTER (OUTPATIENT)
Dept: DATA CONVERSION | Facility: HOSPITAL | Age: 78
Discharge: HOME | End: 2023-08-08
Attending: EMERGENCY MEDICINE

## 2023-08-08 ENCOUNTER — APPOINTMENT (OUTPATIENT)
Dept: PRIMARY CARE | Facility: CLINIC | Age: 78
End: 2023-08-08
Payer: MEDICARE
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
## 2023-08-08 DIAGNOSIS — M25.561 PAIN IN RIGHT KNEE: ICD-10-CM

## 2023-08-08 DIAGNOSIS — M25.562 PAIN IN LEFT KNEE: ICD-10-CM

## 2023-08-08 DIAGNOSIS — S92.414A NONDISPLACED FRACTURE OF PROXIMAL PHALANX OF RIGHT GREAT TOE, INITIAL ENCOUNTER FOR CLOSED FRACTURE: ICD-10-CM

## 2023-08-08 DIAGNOSIS — S92.321A DISPLACED FRACTURE OF SECOND METATARSAL BONE, RIGHT FOOT, INITIAL ENCOUNTER FOR CLOSED FRACTURE: ICD-10-CM

## 2023-08-08 DIAGNOSIS — M25.569 PAIN IN UNSPECIFIED KNEE: ICD-10-CM

## 2023-08-08 DIAGNOSIS — M79.671 PAIN IN RIGHT FOOT: ICD-10-CM

## 2023-08-08 DIAGNOSIS — Z88.0 ALLERGY STATUS TO PENICILLIN: ICD-10-CM

## 2023-08-08 DIAGNOSIS — M79.673 PAIN IN UNSPECIFIED FOOT: ICD-10-CM

## 2023-08-08 DIAGNOSIS — M79.672 PAIN IN LEFT FOOT: ICD-10-CM

## 2023-08-10 ENCOUNTER — OFFICE VISIT (OUTPATIENT)
Dept: PRIMARY CARE | Facility: CLINIC | Age: 78
End: 2023-08-10
Payer: COMMERCIAL

## 2023-08-10 DIAGNOSIS — R33.9 UNABLE TO VOID: ICD-10-CM

## 2023-08-10 DIAGNOSIS — R35.0 URINARY FREQUENCY: Primary | ICD-10-CM

## 2023-08-10 PROCEDURE — 99212 OFFICE O/P EST SF 10 MIN: CPT | Performed by: NURSE PRACTITIONER

## 2023-08-10 PROCEDURE — 1036F TOBACCO NON-USER: CPT | Performed by: NURSE PRACTITIONER

## 2023-08-10 PROCEDURE — 1159F MED LIST DOCD IN RCRD: CPT | Performed by: NURSE PRACTITIONER

## 2023-08-10 PROCEDURE — 1160F RVW MEDS BY RX/DR IN RCRD: CPT | Performed by: NURSE PRACTITIONER

## 2023-08-10 NOTE — PROGRESS NOTES
Magi Boyd is a 78 y.o. female who presents today for a sick visit    Chief Complaint   Patient presents with    UTI     Magi who is a Katharine Hunter patient is here today with c/o possible UTI.        Symptoms: URINARY FREQUENCY     Symptom onset has been acute for a time period of 1 day(s).   Severity is described as mild.   Course of her symptoms over time is acute.    **Pt was not able to urinate at all while in office, so I sent her home with supplies to collect specimen to take to lab       Review of Systems  All 13 systems were reviewed and are within normal limits except positive and pertinent negative responses which are noted below or in HPI.        Objective   Vitals:  There were no vitals taken for this visit.        Physical Exam  Pulmonary:      Effort: Pulmonary effort is normal.   Neurological:      Mental Status: She is alert.   Psychiatric:         Mood and Affect: Mood normal.         Assessment/Plan   Problem List Items Addressed This Visit    None  Visit Diagnoses       Urinary frequency    -  Primary    Relevant Orders    Urinalysis with Reflex Microscopic    Unable to void        Relevant Orders    Urinalysis with Reflex Microscopic

## 2023-08-10 NOTE — PATIENT INSTRUCTIONS
Thank you for seeing me today.  It was a pleasure to meet you!    #ACUTE UTI  Supplies provided to you to collect urine specimen at home and take to a  lab    Avoid caffeine, tea, and carbonated beverages - these can irritate your bladder. Empty your bladder often. Avoid holding urine for long periods of time. Empty your bladder before and after sexual intercourse. After a bowel movement, women should cleanse from front to back. Use each tissue only once.     Call the office if you develop a fever, chills, nausea/vomiting or if you are unable to urinate.    RTC AS PER PCP

## 2023-08-11 ENCOUNTER — LAB (OUTPATIENT)
Dept: LAB | Facility: LAB | Age: 78
End: 2023-08-11
Payer: COMMERCIAL

## 2023-08-11 DIAGNOSIS — R35.0 URINARY FREQUENCY: ICD-10-CM

## 2023-08-11 DIAGNOSIS — R33.9 UNABLE TO VOID: ICD-10-CM

## 2023-08-11 PROCEDURE — 81001 URINALYSIS AUTO W/SCOPE: CPT

## 2023-08-12 DIAGNOSIS — N30.00 ACUTE CYSTITIS WITHOUT HEMATURIA: Primary | ICD-10-CM

## 2023-08-12 LAB
APPEARANCE, URINE: CLEAR
BACTERIA, URINE: ABNORMAL /HPF
BILIRUBIN, URINE: NEGATIVE
BLOOD, URINE: NEGATIVE
COLOR, URINE: YELLOW
GLUCOSE, URINE: NEGATIVE MG/DL
KETONES, URINE: NEGATIVE MG/DL
LEUKOCYTE ESTERASE, URINE: ABNORMAL
MUCUS, URINE: ABNORMAL /LPF
NITRITE, URINE: POSITIVE
PH, URINE: 5 (ref 5–8)
PROTEIN, URINE: NEGATIVE MG/DL
RBC, URINE: <1 /HPF (ref 0–5)
SPECIFIC GRAVITY, URINE: 1.01 (ref 1–1.03)
SQUAMOUS EPITHELIAL CELLS, URINE: <1 /HPF
UROBILINOGEN, URINE: <2 MG/DL (ref 0–1.9)
WBC CLUMPS, URINE: ABNORMAL /HPF
WBC, URINE: 11 /HPF (ref 0–5)

## 2023-08-12 RX ORDER — SULFAMETHOXAZOLE AND TRIMETHOPRIM 800; 160 MG/1; MG/1
1 TABLET ORAL 2 TIMES DAILY
Qty: 10 TABLET | Refills: 0 | Status: SHIPPED | OUTPATIENT
Start: 2023-08-12 | End: 2023-08-17

## 2023-08-14 ENCOUNTER — TELEPHONE (OUTPATIENT)
Dept: PRIMARY CARE | Facility: CLINIC | Age: 78
End: 2023-08-14
Payer: MEDICARE

## 2023-08-23 ENCOUNTER — OFFICE VISIT (OUTPATIENT)
Dept: PRIMARY CARE | Facility: CLINIC | Age: 78
End: 2023-08-23
Payer: COMMERCIAL

## 2023-08-23 VITALS
HEART RATE: 48 BPM | DIASTOLIC BLOOD PRESSURE: 80 MMHG | WEIGHT: 243 LBS | SYSTOLIC BLOOD PRESSURE: 126 MMHG | RESPIRATION RATE: 18 BRPM | OXYGEN SATURATION: 97 % | HEIGHT: 66 IN | BODY MASS INDEX: 39.05 KG/M2

## 2023-08-23 DIAGNOSIS — N18.4 BENIGN HYPERTENSION WITH CKD (CHRONIC KIDNEY DISEASE) STAGE IV (MULTI): ICD-10-CM

## 2023-08-23 DIAGNOSIS — R60.9 EDEMA, UNSPECIFIED TYPE: ICD-10-CM

## 2023-08-23 DIAGNOSIS — Z00.00 ROUTINE GENERAL MEDICAL EXAMINATION AT HEALTH CARE FACILITY: ICD-10-CM

## 2023-08-23 DIAGNOSIS — I12.9 BENIGN HYPERTENSION WITH CKD (CHRONIC KIDNEY DISEASE) STAGE IV (MULTI): ICD-10-CM

## 2023-08-23 DIAGNOSIS — E03.9 ACQUIRED HYPOTHYROIDISM: ICD-10-CM

## 2023-08-23 DIAGNOSIS — Z00.00 MEDICARE ANNUAL WELLNESS VISIT, SUBSEQUENT: ICD-10-CM

## 2023-08-23 DIAGNOSIS — I10 PRIMARY HYPERTENSION: Primary | ICD-10-CM

## 2023-08-23 PROBLEM — M19.90 ARTHRITIS: Status: ACTIVE | Noted: 2023-08-23

## 2023-08-23 PROBLEM — Z09 HOSPITAL DISCHARGE FOLLOW-UP: Status: RESOLVED | Noted: 2023-04-30 | Resolved: 2023-08-23

## 2023-08-23 PROBLEM — J32.9 CHRONIC SINUSITIS: Status: ACTIVE | Noted: 2023-08-23

## 2023-08-23 PROBLEM — E78.5 HYPERLIPIDEMIA: Status: ACTIVE | Noted: 2023-08-23

## 2023-08-23 PROBLEM — M79.676 TOE PAIN: Status: RESOLVED | Noted: 2023-08-23 | Resolved: 2023-08-23

## 2023-08-23 PROBLEM — R51.9 HEADACHE: Status: ACTIVE | Noted: 2023-08-23

## 2023-08-23 PROBLEM — B35.1 MYCOTIC TOENAILS: Status: RESOLVED | Noted: 2023-04-20 | Resolved: 2023-08-23

## 2023-08-23 PROBLEM — M10.9 URIC ACID ARTHROPATHY: Status: RESOLVED | Noted: 2023-04-20 | Resolved: 2023-08-23

## 2023-08-23 PROBLEM — B37.0 THRUSH, ORAL: Status: RESOLVED | Noted: 2023-04-30 | Resolved: 2023-08-23

## 2023-08-23 PROBLEM — R51.9 HEADACHE: Status: RESOLVED | Noted: 2023-08-23 | Resolved: 2023-08-23

## 2023-08-23 PROBLEM — J30.9 ALLERGIC RHINITIS: Status: ACTIVE | Noted: 2023-08-23

## 2023-08-23 PROBLEM — J41.0 SIMPLE CHRONIC BRONCHITIS (MULTI): Status: RESOLVED | Noted: 2023-08-23 | Resolved: 2023-08-23

## 2023-08-23 PROBLEM — R06.02 SOB (SHORTNESS OF BREATH): Status: RESOLVED | Noted: 2023-08-23 | Resolved: 2023-08-23

## 2023-08-23 PROCEDURE — 1159F MED LIST DOCD IN RCRD: CPT | Performed by: NURSE PRACTITIONER

## 2023-08-23 PROCEDURE — 3074F SYST BP LT 130 MM HG: CPT | Performed by: NURSE PRACTITIONER

## 2023-08-23 PROCEDURE — 1170F FXNL STATUS ASSESSED: CPT | Performed by: NURSE PRACTITIONER

## 2023-08-23 PROCEDURE — 1160F RVW MEDS BY RX/DR IN RCRD: CPT | Performed by: NURSE PRACTITIONER

## 2023-08-23 PROCEDURE — 99214 OFFICE O/P EST MOD 30 MIN: CPT | Performed by: NURSE PRACTITIONER

## 2023-08-23 PROCEDURE — 1036F TOBACCO NON-USER: CPT | Performed by: NURSE PRACTITIONER

## 2023-08-23 PROCEDURE — 3079F DIAST BP 80-89 MM HG: CPT | Performed by: NURSE PRACTITIONER

## 2023-08-23 ASSESSMENT — PATIENT HEALTH QUESTIONNAIRE - PHQ9
1. LITTLE INTEREST OR PLEASURE IN DOING THINGS: NOT AT ALL
2. FEELING DOWN, DEPRESSED OR HOPELESS: NOT AT ALL
SUM OF ALL RESPONSES TO PHQ9 QUESTIONS 1 AND 2: 0

## 2023-08-23 ASSESSMENT — ACTIVITIES OF DAILY LIVING (ADL)
DRESSING: NEEDS ASSISTANCE
BATHING: NEEDS ASSISTANCE
TAKING_MEDICATION: INDEPENDENT
GROCERY_SHOPPING: NEEDS ASSISTANCE
MANAGING_FINANCES: INDEPENDENT
DOING_HOUSEWORK: NEEDS ASSISTANCE

## 2023-08-23 ASSESSMENT — ENCOUNTER SYMPTOMS
OCCASIONAL FEELINGS OF UNSTEADINESS: 1
LOSS OF SENSATION IN FEET: 0
DEPRESSION: 0

## 2023-08-23 NOTE — ASSESSMENT & PLAN NOTE
Discussed not increasing her furosemide as her creatinine has elevated  Discussed elevating legs  Discussed that she does have a fracture to her right foot and that compressions hose she cannot get on that foot  Reinforced elevating the leg  Discussed after the foot heals to then proceed with the lymphedema clinic

## 2023-08-23 NOTE — PROGRESS NOTES
Subjective   Reason for Visit: Magi Boyd is an 78 y.o. female here for a Medicare Wellness visit.     Past Medical, Surgical, and Family History reviewed and updated in chart.    Reviewed all medications by prescribing practitioner or clinical pharmacist (such as prescriptions, OTCs, herbal therapies and supplements) and documented in the medical record.    Presents to follow-up for ER visit for fall.  She states 2 weeks ago she was getting into the shower she had a hole on the ground bars and her knees caught on the edge of the bathtub.  She states she went down on her knees and did hit her right foot and states that she heard a break.  She was taken the ambulance to [facility] and found to have a broken right foot.  She was placed in a orthopedic shoe and she will be following up with podiatry today.  She does have grab bars in the shower  She does use a walker to ambulate  Has been following nephrology for her CKD.  She did follow-up in July and was recommended to go to lymphedema clinic and she did receive referral however, she does have a fall between and has not been able to attend the lymphedema clinic    She still complains of urinary frequency and incontinence and will be seeing uro-GYN next week    She has noticed that her legs have been swollen and she will have an area to her right shin that did not have any blisterlike lesion that did pop on its own and did leak clear fluid.  She did see my colleague after her fall and was recommended to elevate legs and wear compression stockings but she has not been able to do this.  She would like to know if she could do anything else.  She is taking furosemide but this is being monitored closely due to her CKD    Mammogram - declines  Colon screen - no longer indicated  PPSV 2019        Patient Care Team:  MEENU Retana as PCP - General  MEENU Retana as PCP - [employer] Medicare [employer]vantage PCP     Review of Systems   All other systems  "reviewed and are negative.      Objective   Vitals:  /80   Pulse (!) 48   Resp 18   Ht 1.676 m (5' 6\")   Wt 110 kg (243 lb)   SpO2 97%   BMI 39.22 kg/m²       Physical Exam  Vitals and nursing note reviewed.   Constitutional:       General: She is not in acute distress.     Appearance: Normal appearance. She is normal weight.   HENT:      Head: Normocephalic and atraumatic.      Right Ear: Tympanic membrane, ear canal and external ear normal.      Left Ear: Tympanic membrane, ear canal and external ear normal.      Nose: Nose normal.      Mouth/Throat:      Mouth: Mucous membranes are moist.      Pharynx: Oropharynx is clear.   Eyes:      Extraocular Movements: Extraocular movements intact.      Conjunctiva/sclera: Conjunctivae normal.      Pupils: Pupils are equal, round, and reactive to light.   Neck:      Vascular: No carotid bruit.   Cardiovascular:      Rate and Rhythm: Normal rate and regular rhythm.      Pulses: Normal pulses.      Heart sounds: Normal heart sounds.   Pulmonary:      Effort: Pulmonary effort is normal.      Breath sounds: Normal breath sounds.   Abdominal:      General: Bowel sounds are normal. There is no distension.      Palpations: Abdomen is soft.      Tenderness: There is no abdominal tenderness.   Musculoskeletal:         General: Normal range of motion.      Cervical back: Normal range of motion and neck supple.      Right lower leg: No edema.      Left lower leg: No edema.   Lymphadenopathy:      Cervical: No cervical adenopathy.   Skin:     General: Skin is warm and dry.      Capillary Refill: Capillary refill takes less than 2 seconds.   Neurological:      General: No focal deficit present.      Mental Status: She is alert and oriented to person, place, and time. Mental status is at baseline.      Motor: No weakness.   Psychiatric:         Mood and Affect: Mood normal.         Behavior: Behavior normal.         Thought Content: Thought content normal.         Judgment: " Judgment normal.         Assessment/Plan   Problem List Items Addressed This Visit       Benign hypertension with CKD (chronic kidney disease) stage IV (CMS/[facility])    Overview     Following nephrology  Continue furosemide  Continue atenolol  Follow up in 4 months  Reviewed labs from July         Current Assessment & Plan     Following nephrology  Continue furosemide  Continue atenolol  Follow up in 4 months  Reviewed labs from July         RESOLVED: Primary hypertension - Primary    Edema    Overview     Discussed not increasing her furosemide as her creatinine has elevated  Discussed elevating legs  Discussed that she does have a fracture to her right foot and that compressions hose she cannot get on that foot  Reinforced elevating the leg  Discussed after the foot heals to then proceed with the lymphedema clinic         Current Assessment & Plan     Discussed not increasing her furosemide as her creatinine has elevated  Discussed elevating legs  Discussed that she does have a fracture to her right foot and that compressions hose she cannot get on that foot  Reinforced elevating the leg  Discussed after the foot heals to then proceed with the lymphedema clinic         Medicare annual wellness visit, subsequent    Overview     - UTD with vaccine   -  colonoscopy no longer indicated  -  living will reviewed with patient           Current Assessment & Plan     - UTD with vaccine   -  colonoscopy no longer indicated  -  living will reviewed with patient         Acquired hypothyroidism    Overview     TSH level  Will adjust levothyroxine based on results         Current Assessment & Plan     TSH level  Will adjust levothyroxine based on results         Relevant Orders    TSH with reflex to Free T4 if abnormal     Other Visit Diagnoses       Routine general medical examination at health care facility

## 2023-08-23 NOTE — ASSESSMENT & PLAN NOTE
Following nephrology  Creatinine did increase to 2.8 in July  She will follow-up in 3 months with nephrology and repeat labs

## 2023-08-23 NOTE — PROGRESS NOTES
"Subjective   Patient ID: Magi Boyd is a 78 y.o. female who presents for Hospital Follow-up (Patient in today for Hospital F/U, states that two weeks ago she fell while stepping into the shower. Has a FX on her right foot. Patient states that she is still having trouble with UTI, but she will see Urology in two weeks. ).    HPI     Review of Systems    Objective   /80   Pulse (!) 48   Resp 18   Ht 1.676 m (5' 6\")   Wt 110 kg (243 lb)   SpO2 97%   BMI 39.22 kg/m²     Physical Exam    Assessment/Plan          "

## 2023-08-23 NOTE — ASSESSMENT & PLAN NOTE
Following nephrology  Continue furosemide  Continue atenolol  Follow up in 4 months  Reviewed labs from July

## 2023-08-23 NOTE — PATIENT INSTRUCTIONS
Follow up with the lymphedema clinic to help with the swelling in your leg  Follow up with the podiatrist for your broken foot  Take your symbicort daily! This is will help with your cough  Have your labs done when you need for the kidney doctor - I added a thyroid test on  Follow up with the urologist as well

## 2023-09-06 LAB
BACTERIA, URINE: ABNORMAL /HPF
RBC, URINE: 1 /HPF (ref 0–5)
SQUAMOUS EPITHELIAL CELLS, URINE: 1 /HPF
WBC, URINE: 22 /HPF (ref 0–5)

## 2023-09-08 LAB — URINE CULTURE: ABNORMAL

## 2023-10-17 PROBLEM — N32.81 OAB (OVERACTIVE BLADDER): Status: ACTIVE | Noted: 2023-10-17

## 2023-10-17 RX ORDER — GRANULES FOR ORAL 3 G/1
POWDER ORAL
COMMUNITY
Start: 2023-09-06 | End: 2024-04-01 | Stop reason: WASHOUT

## 2023-10-17 RX ORDER — TROSPIUM CHLORIDE ER 60 MG/1
1 CAPSULE ORAL DAILY
COMMUNITY
Start: 2023-09-06 | End: 2024-01-03 | Stop reason: SDUPTHER

## 2023-10-19 ENCOUNTER — PROCEDURE VISIT (OUTPATIENT)
Dept: UROLOGY | Facility: CLINIC | Age: 78
End: 2023-10-19
Payer: MEDICARE

## 2023-10-19 DIAGNOSIS — N39.0 RECURRENT UTI: Primary | ICD-10-CM

## 2023-10-19 DIAGNOSIS — N32.81 OAB (OVERACTIVE BLADDER): ICD-10-CM

## 2023-10-19 LAB
POC APPEARANCE, URINE: CLEAR
POC BILIRUBIN, URINE: NEGATIVE
POC BLOOD, URINE: ABNORMAL
POC COLOR, URINE: YELLOW
POC GLUCOSE, URINE: NEGATIVE MG/DL
POC KETONES, URINE: NEGATIVE MG/DL
POC LEUKOCYTES, URINE: ABNORMAL
POC NITRITE,URINE: NEGATIVE
POC PH, URINE: 5.5 PH
POC PROTEIN, URINE: NEGATIVE MG/DL
POC SPECIFIC GRAVITY, URINE: 1.02
POC UROBILINOGEN, URINE: 0.2 EU/DL

## 2023-10-19 PROCEDURE — 52000 CYSTOURETHROSCOPY: CPT | Performed by: STUDENT IN AN ORGANIZED HEALTH CARE EDUCATION/TRAINING PROGRAM

## 2023-10-19 PROCEDURE — 99213 OFFICE O/P EST LOW 20 MIN: CPT | Performed by: STUDENT IN AN ORGANIZED HEALTH CARE EDUCATION/TRAINING PROGRAM

## 2023-10-19 NOTE — PROGRESS NOTES
HISTORY OF PRESENT ILLNESS:  Here for cystoscopy for recurrent UTI, currently on estradiol and fosfomycin and doing well her overactive bladder symptoms improved.  She is  also taking trospium which is helping her overactive bladder.         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), GERD (gastroesophageal reflux disease) (04/20/2023), Lower urinary tract symptoms (04/20/2023), Obesity, unspecified (08/01/2022), Otalgia, left ear (02/18/2020), Other abnormal glucose (09/29/2016), Other specified abnormal findings of blood chemistry (10/05/2016), Other specified disorders of ear, unspecified ear (03/18/2015), Pelvic and perineal pain (10/24/2019), Personal history of other diseases of the respiratory system (12/05/2013), Personal history of other diseases of the respiratory system (03/18/2015), Personal history of other diseases of the respiratory system (12/17/2019), Personal history of other drug therapy (10/25/2019), Personal history of other endocrine, nutritional and metabolic disease, Personal history of other specified conditions (09/26/2013), Personal history of other specified conditions (10/29/2013), Personal history of other specified conditions (02/18/2020), Personal history of other specified conditions (02/18/2020), Personal history of other specified conditions (02/09/2015), Personal history of urinary (tract) infections (12/18/2018), Persons encountering health services in other specified circumstances (10/25/2019), Right knee pain (04/20/2023), Simple chronic bronchitis (CMS/HCC) (08/23/2023), SOB (shortness of breath) (08/23/2023), Toe  "pain (08/23/2023), Unspecified abnormal findings in urine (08/25/2021), Urinary tract infection, site not specified (10/18/2021), and Urinary tract infection, site not specified (08/30/2022).    Surgical History  She has no past surgical history on file.     Social History  She reports that she has never smoked. She has never used smokeless tobacco. She reports that she does not currently use alcohol. She reports that she does not use drugs.    Family History  Family History   Problem Relation Name Age of Onset    Heart disease Mother      Brain cancer Father      Lung cancer Father      Heart disease Brother      Heart disease Brother      Heart disease Brother      Heart disease Brother      Diabetes Other FAM HX         Allergies  Levofloxacin, Linezolid, Nitrofurantoin monohyd/m-cryst, and Penicillins      A comprehensive 10+ review of systems was negative except for: see hpi                          PHYSICAL EXAMINATION:  BP Readings from Last 3 Encounters:   08/23/23 126/80   06/28/23 128/78   04/20/23 106/60      Wt Readings from Last 3 Encounters:   08/23/23 110 kg (243 lb)   07/20/23 111 kg (245 lb)   06/28/23 110 kg (242 lb 3.2 oz)      BMI: Estimated body mass index is 39.22 kg/m² as calculated from the following:    Height as of 8/23/23: 1.676 m (5' 6\").    Weight as of 8/23/23: 110 kg (243 lb).  BSA: Estimated body surface area is 2.26 meters squared as calculated from the following:    Height as of 8/23/23: 1.676 m (5' 6\").    Weight as of 8/23/23: 110 kg (243 lb).  HEENT: Normocephalic, atraumatic, PER EOMI, nonicteric, trachea normal, thyroid normal, oropharynx normal.  CARDIAC: regular rate & rhythm, S1 & S2 normal.  No heaves, thrills, gallops or murmurs.  LUNGS: Clear to auscultation, no spinal or CV tenderness.  EXTREMITIES: No evidence of cyanosis, clubbing or edema.    Indication  Recurrent UTI     POST-OP DIAGNOSIS:   Same.  ANESTHESIA:    2% Lidocaine " gel.  PROCEDURE:    Cystoscopy.  SURGEON:     raghav    FINDINGS    Bladder:  normal  Trigone & Ureteral Orifices: normal.  Vesical Neck:  normal.  Urethra: normal.    PROCEDURE PHYSICIAN NOTE  I was present in the exam room  for the entire procedure as above. We reviewed procedure with patient along with the indications, options, alternatives,  risks of having and not having procedure,  benefits, and probability of success.  We answered the patients questions. We explained the expected post procedure symptoms including possible dysuria and infection.  Pt consented to have procedure.  The patient will follow up for discussion of her results.                   Assessment:  78-year-old with recurrent UTIs and overactive bladder in the setting of stage IV kidney disease     Recurrent UTI :  Continue estradiol cream and fosfomycin  Cystoscopy negative  Pending culture ordered     OAB:  Failed oxybutynin, doing well with trospium    Follow-up in 3 months       Arsen Allan MD          Plan: Follow up in 3 months. Continue taking antibiotic.

## 2023-10-25 DIAGNOSIS — E78.2 MIXED HYPERLIPIDEMIA: ICD-10-CM

## 2023-10-25 RX ORDER — ATORVASTATIN CALCIUM 20 MG/1
20 TABLET, FILM COATED ORAL DAILY
Qty: 90 TABLET | Refills: 0 | Status: SHIPPED | OUTPATIENT
Start: 2023-10-25 | End: 2024-02-08 | Stop reason: SDUPTHER

## 2023-11-04 ENCOUNTER — LAB (OUTPATIENT)
Dept: LAB | Facility: LAB | Age: 78
End: 2023-11-04
Payer: MEDICARE

## 2023-11-04 DIAGNOSIS — E03.9 ACQUIRED HYPOTHYROIDISM: ICD-10-CM

## 2023-11-04 DIAGNOSIS — N18.9 CHRONIC KIDNEY DISEASE, UNSPECIFIED: ICD-10-CM

## 2023-11-04 DIAGNOSIS — R32 UNSPECIFIED URINARY INCONTINENCE: Primary | ICD-10-CM

## 2023-11-04 LAB
25(OH)D3 SERPL-MCNC: 91 NG/ML (ref 30–100)
ALBUMIN SERPL BCP-MCNC: 3.6 G/DL (ref 3.4–5)
ANION GAP SERPL CALC-SCNC: 13 MMOL/L (ref 10–20)
BUN SERPL-MCNC: 44 MG/DL (ref 6–23)
CALCIUM SERPL-MCNC: 9.6 MG/DL (ref 8.6–10.3)
CHLORIDE SERPL-SCNC: 103 MMOL/L (ref 98–107)
CO2 SERPL-SCNC: 25 MMOL/L (ref 21–32)
CREAT SERPL-MCNC: 3.03 MG/DL (ref 0.5–1.05)
CREAT UR-MCNC: 58 MG/DL (ref 20–320)
ERYTHROCYTE [DISTWIDTH] IN BLOOD BY AUTOMATED COUNT: 12.9 % (ref 11.5–14.5)
FERRITIN SERPL-MCNC: 96 NG/ML (ref 8–150)
GFR SERPL CREATININE-BSD FRML MDRD: 15 ML/MIN/1.73M*2
GLUCOSE SERPL-MCNC: 91 MG/DL (ref 74–99)
HCT VFR BLD AUTO: 34.3 % (ref 36–46)
HGB BLD-MCNC: 11.3 G/DL (ref 12–16)
IRON SATN MFR SERPL: 28 % (ref 25–45)
IRON SERPL-MCNC: 85 UG/DL (ref 35–150)
MCH RBC QN AUTO: 32.1 PG (ref 26–34)
MCHC RBC AUTO-ENTMCNC: 32.9 G/DL (ref 32–36)
MCV RBC AUTO: 97 FL (ref 80–100)
MICROALBUMIN UR-MCNC: 14.1 MG/L
MICROALBUMIN/CREAT UR: 24.3 UG/MG CREAT
NRBC BLD-RTO: 0 /100 WBCS (ref 0–0)
PHOSPHATE SERPL-MCNC: 3.8 MG/DL (ref 2.5–4.9)
PLATELET # BLD AUTO: 176 X10*3/UL (ref 150–450)
POTASSIUM SERPL-SCNC: 4 MMOL/L (ref 3.5–5.3)
PTH-INTACT SERPL-MCNC: 94.2 PG/ML (ref 18.5–88)
RBC # BLD AUTO: 3.52 X10*6/UL (ref 4–5.2)
SODIUM SERPL-SCNC: 137 MMOL/L (ref 136–145)
TIBC SERPL-MCNC: 303 UG/DL (ref 240–445)
TSH SERPL-ACNC: 3.29 MIU/L (ref 0.44–3.98)
UIBC SERPL-MCNC: 218 UG/DL (ref 110–370)
URATE SERPL-MCNC: 3.9 MG/DL (ref 2.3–6.7)
WBC # BLD AUTO: 4.7 X10*3/UL (ref 4.4–11.3)

## 2023-11-04 PROCEDURE — 80069 RENAL FUNCTION PANEL: CPT

## 2023-11-04 PROCEDURE — 82570 ASSAY OF URINE CREATININE: CPT

## 2023-11-04 PROCEDURE — 83970 ASSAY OF PARATHORMONE: CPT

## 2023-11-04 PROCEDURE — 84550 ASSAY OF BLOOD/URIC ACID: CPT

## 2023-11-04 PROCEDURE — 82728 ASSAY OF FERRITIN: CPT

## 2023-11-04 PROCEDURE — 83540 ASSAY OF IRON: CPT

## 2023-11-04 PROCEDURE — 36415 COLL VENOUS BLD VENIPUNCTURE: CPT

## 2023-11-04 PROCEDURE — 83550 IRON BINDING TEST: CPT

## 2023-11-04 PROCEDURE — 84443 ASSAY THYROID STIM HORMONE: CPT

## 2023-11-04 PROCEDURE — 82306 VITAMIN D 25 HYDROXY: CPT

## 2023-11-04 PROCEDURE — 82043 UR ALBUMIN QUANTITATIVE: CPT

## 2023-11-04 PROCEDURE — 85027 COMPLETE CBC AUTOMATED: CPT

## 2023-11-08 ENCOUNTER — OFFICE VISIT (OUTPATIENT)
Dept: NEPHROLOGY | Facility: CLINIC | Age: 78
End: 2023-11-08
Payer: MEDICARE

## 2023-11-08 VITALS
DIASTOLIC BLOOD PRESSURE: 101 MMHG | BODY MASS INDEX: 38.83 KG/M2 | TEMPERATURE: 96.9 F | RESPIRATION RATE: 16 BRPM | OXYGEN SATURATION: 95 % | SYSTOLIC BLOOD PRESSURE: 166 MMHG | HEIGHT: 66 IN | WEIGHT: 241.6 LBS

## 2023-11-08 DIAGNOSIS — N18.5 CKD (CHRONIC KIDNEY DISEASE), STAGE V (MULTI): Primary | ICD-10-CM

## 2023-11-08 DIAGNOSIS — N32.81 OAB (OVERACTIVE BLADDER): ICD-10-CM

## 2023-11-08 DIAGNOSIS — N18.4 BENIGN HYPERTENSION WITH CKD (CHRONIC KIDNEY DISEASE) STAGE IV (MULTI): ICD-10-CM

## 2023-11-08 DIAGNOSIS — I12.9 BENIGN HYPERTENSION WITH CKD (CHRONIC KIDNEY DISEASE) STAGE IV (MULTI): ICD-10-CM

## 2023-11-08 PROCEDURE — 3077F SYST BP >= 140 MM HG: CPT | Performed by: INTERNAL MEDICINE

## 2023-11-08 PROCEDURE — 99215 OFFICE O/P EST HI 40 MIN: CPT | Performed by: INTERNAL MEDICINE

## 2023-11-08 PROCEDURE — 1160F RVW MEDS BY RX/DR IN RCRD: CPT | Performed by: INTERNAL MEDICINE

## 2023-11-08 PROCEDURE — 1159F MED LIST DOCD IN RCRD: CPT | Performed by: INTERNAL MEDICINE

## 2023-11-08 PROCEDURE — 3080F DIAST BP >= 90 MM HG: CPT | Performed by: INTERNAL MEDICINE

## 2023-11-08 PROCEDURE — 1036F TOBACCO NON-USER: CPT | Performed by: INTERNAL MEDICINE

## 2023-11-08 NOTE — PROGRESS NOTES
"Elevated today.Subjective       Magi Boyd is a 78 y.o. female who has past medical history of  **** presents for       Objective   BP (!) 166/101 (Patient Position: Standing)   Temp 36.1 °C (96.9 °F)   Resp 16   Ht 1.676 m (5' 6\")   Wt 110 kg (241 lb 9.6 oz)   SpO2 95%   BMI 39.00 kg/m²   Wt Readings from Last 3 Encounters:   11/08/23 110 kg (241 lb 9.6 oz)   08/23/23 110 kg (243 lb)   07/20/23 111 kg (245 lb)       Physical Exam    General appearance: no distress awake and alert on room air, euvolemic on exam  Eyes: non-icteric  HEENT: atrumatic head, PEERLA, moist mucosa  Skin: no apparent rash  Heart: NSR, S1, S2 normal, no murmur or gallop  Lungs: Symmetrical expansion,CTA bilat no wheezing/crackles  Abdomen: soft, nt/nd, obese  Extremities: no edema bilat  Neuro: No FND,asterixis, no focal deficits noticed        Review of Systems     Constitutional: no fever, no chills, no recent weight gain and no recent weight loss.   Eyes: no blurred vision and no diplopia.   ENT: no hearing loss, no earache, no sore throat, no swollen glands in the neck and no nasal discharge.   Cardiovascular: no chest pain, no palpitations and no lower extremity edema.   Respiratory: no shortness of breath, no chronic cough and no shortness of breath during exertion.   Gastrointestinal: no abdominal pain, no constipation, no heartburn, no vomiting, no bloody stools and no change in bowel movements.   Genitourinary: no dysuria and no hematuria.   Musculoskeletal: no arthralgias and no myalgias.   Skin: no rashes and no skin lesions.   Neurological: no headaches and no dizziness.   Psychiatric: no confusion, no depression and no anxiety.   Endocrine: no heat intolerance, no cold intolerance, appetite not increased, no thyroid disorder, no increased urinary frequency and no dry skin.   Hematologic/Lymphatic: does not bleed easily and does not bruise easily.   All other systems have been reviewed and are negative for " "complaint.         Data Review        Results from last 7 days   Lab Units 11/04/23  1043   WBC AUTO x10*3/uL 4.7   HEMOGLOBIN g/dL 11.3*   HEMATOCRIT % 34.3*   PLATELETS AUTO x10*3/uL 176        Results from last 7 days   Lab Units 11/04/23  1043   SODIUM mmol/L 137   POTASSIUM mmol/L 4.0   CHLORIDE mmol/L 103   CO2 mmol/L 25   BUN mg/dL 44*   CREATININE mg/dL 3.03*   EGFR mL/min/1.73m*2 15*   GLUCOSE mg/dL 91   CALCIUM mg/dL 9.6   PHOSPHORUS mg/dL 3.8       Lab Results   Component Value Date    URICACID 3.9 11/04/2023       No components found for: \"[id number]\"      No results found for: \"HGBA1C\"      Lab Results   Component Value Date    CALCIUM 9.6 11/04/2023    PHOS 3.8 11/04/2023         No results found for requested labs within last 365 days.        Results from last 7 days   Lab Units 11/04/23  1043   SODIUM mmol/L 137   POTASSIUM mmol/L 4.0   CHLORIDE mmol/L 103   CO2 mmol/L 25   BUN mg/dL 44*   GLUCOSE mg/dL 91   CALCIUM mg/dL 9.6   ANION GAP mmol/L 13   EGFR mL/min/1.73m*2 15*             Albumin/Creatine Ratio   Date Value Ref Range Status   11/04/2023 24.3 <30.0 ug/mg Creat Final   07/13/2023 SEE COMMENT 0.0 - 30.0 ug/mg crt Final     Comment:     One or more analytes used in this calculation   is outside of the analytical measurement range.  Calculation cannot be performed.     04/13/2023 12.2 0.0 - 30.0 ug/mg crt Final       Recent Labs     11/04/23  1043 07/13/23  1120 04/14/23  0641 04/14/23  0039 04/13/23  1920 04/05/23  1007 11/03/22  1006 07/27/22  1039    137 140 134* 137 135* 140 137   K 4.0 4.1 3.6 3.7 3.8 3.8 4.1 4.0   CO2 25 25 30 20* 29 24 26 26   BUN 44* 40* 31* 33* 32* 29* 30* 33*   GLUCOSE 91 93 93 114* 130* 94 101* 93   CALCIUM 9.6 9.6 10.0 9.9 10.0 9.9 9.8 10.0   PHOS 3.8 4.0 3.6 3.0  --  3.1 3.8 3.5   CREATININE 3.03* 2.86* 2.50* 2.54* 2.61* 2.45* 2.63* 2.82*   EGFR 15*  --   --   --   --   --   --   --    ANIONGAP 13 12 12 15 11 12 15 13      "       ua  @.cvlabrcnt(coloru:*,appearanceu:*,specgravu:*,nely:*,protur:*,glucoseu:*,bloodu:*,ketonesu:*,bilirubinu:*,urobilogen:*,nitriteu:*,leukocytesu:*,)@     ulytes  @.cvlabrcnt(sodiumurr:*,nacreatur:*,kur:*,kcreatur:*,creatu:*,protur:*,utpcr:*,)@  Microurine      @.cvlabrcnt(wbcu:*,rbcu:*,hyalcastu:*,squamepiu:*,bacteriau:*,mucusu:*,hyalcastu:*,)@      Assessment and Plan     Dear CHANDNI   It was nice seeing you in the nephrology clinic today     Today we discussed the following:     #Chronic kidney disease stage 4, 15-20% no need to start dialysis yet. Kidney function is stable at this time  #Hypertension: Blood pressure is accepted. Target blood pressure 110-130/70-90.   #Uric acid is improved a lot-continue febuxostat once daily-refill today  #Leg swelling-was concerned about lymphedema-I will refer you to the lymphedema clinic  #If you need dialysis we will get graft inserted-will let the vascular surgeon know when we needed  #Urine frequency and recent incontinence-I referred you to Dr. GASTELUM from urology for evaluation  #Iron deficiency anemia-we will repeat-start oral iron. Be aware that stool might turn black and you can get constipated-you can use over-the-counter stool softener as needed. We will consider iron infusion if needed        I will see you again in 3 months with another set of blood work prior to visit     Please call our office if you have any question  Thank you for coming to see me today     Laura Narayan MD, MS, TATYANA ROSARIO  Clinical  - [facility] [employer]  Nephrologist - [facility]

## 2023-11-08 NOTE — PROGRESS NOTES
Subjective     Ms. Boyd is a 78-year-old white female with past medical history of hypertension, hyperlipidemia, knee osteoarthritis, hypothyroidism, overactive bladder, asthma, remote history of GI bleed, heavy NSAID use, asthma and CKD who is coming today for advanced CKD follow-up, now accompanied by her .    Last visit July 2023. Here today with her . Since then, pt states she had a mechanical fall 3 months ago and fractured her R foot. 2 months ago, she has seen a urologist and was prescribed estradiol, abx and trospium. She had a cystoscopy and said it “looked good”. Has continued urinary frequency and incontinence, going every few hours with “slow flow”. Will see urology 1/2024.   Pt has felt headaches recently, along with feeling more tired and fatigued. States she is not sleeping well. Denies recent confusion.   BP this visit 160s/100s, does not check at home. Has continued swelling to legs and takes Lasix 20mg po daily. Continues with PO iron, discussed improved anemia.   Discussed again with pt how she is not a good candidate for fistula, plan for graft for HD. Will have close follow ups.       Per prior notes     Last office visit April 2023. In interim, she got admitted with leg cellulitis started antibiotic. She has been suffering from recurrent UTI and lower urinary tract symptoms with new incontinence that is hard to treat. She finished 2-3 courses of antibiotic already. She was instructed to start the d-mannose per PCP. She is very frustrated because of the and urinary symptoms and she requested urology referral. Kidney function is stable. Not uremic or significantly hypervolemic on exam. Continues to have significant leg swelling which looks like lymphedema. She stopped taking oral iron as she was taking many pills at that time and she decided to quit. Discussed blood work results that showed iron deficiency anemia-she is willing to start p.o. iron. Offered starting infusion as  needed. No significant albuminuria. Vitamin D is normal. Uric acid is normal-refilled febuxostat today     Her prior notes     Last office visit November 2022. In the interim she continues to have leg swelling (unilateral) concerning for DVT or cellulitis. We discussed our concerns with her today. We contacted PCP to update them. Most recent renal function stable at this time, with a serum creatinine 2.4, GFR 20 and BUN 29. She has completed an initial evaluation for AV fistula and decision for insertion of graft once the need for HD arrives. She states LE edema gets worse with food. We discussed the importance of a balanced diet, low in Na ( avoid hot dogs, noonan...) She agrees. She has lower urinary tract symptoms for which we will rule out UTI today. Although she is in good spirits, she expressed her decision not to pursue renal transplantation. Otherwise no new complaints.         Per prior notes  Last office visit April 2022. Patient came with her  today and is doing well. Was evaluated for a HD fistula but due to vasculature will need graft if/when dialysis is needed. Was sick last with with flu like symptoms but did not have a fever, was not tested for Covid-19. BP at home is in the 120s/70s but was running high today at the office. Leg swelling has slightly improved from prior. Patient is going to hold off on knee surgery.      Per prior notes     Last office visit January 2022. Today, Magi came with her . She feels in her baseline state of health. She lost her son last month for [employer] and she had to drive to [place]. She limited her fluid intake during the road trip. Next day she noticed decreased urine output. Urine output improved later and now in her baseline. She continues to follow low-salt diet. She did not need the dietitian as she supposed to. She is still deciding on the knee surgery. She did blood work 10 days ago and results were discussed with her and her  in detail  today including slightly worsening serum creatinine and GFR, within normal potassium, low sodium 132. She reports occasional headache and she takes Tylenol. She has leg swelling. She is adherent to hydrochlorothiazide and rest of medications. Uric acid improved and now normal on febuxostat.  had many questions and all of them were discussed in details and answered. Overall, kidney function is slowly worsening and I anticipate starting dialysis in her lifetime. Blood pressure is elevated today     Per prior notes     Last office visit October 2021. In the interim, she continues to follow a low-salt diet. She started febuxostat for elevated uric acid with improvement in her knee pain. She did not see a dietitian and she work with one of her friends on healthier diet choices. Weight is stable. She still considering risks and benefits of knee replacement surgery and she is planned to see the orthopedic surgeon in the next few weeks or so. Blood pressure is under good control. No lower urine tract symptoms.  is concerned about limited diet options and not eating well. She agreed to start multivitamins. She reports better taste in the mouth that might be related to febuxostat. Weighing the risks versus benefits of continuing medication. Given her recommendation regarding mouthwash, chewing gums, ice cubes in the mouth etc. Overall she is stable. I reviewed blood work and urinalysis with her in details including stable serum creatinine, GFR, improving albuminuria, normal vitamin D, significant improvement in uric acid after starting Uloric, stable mild anemia     Per prior notes     Last office visit August 2021. We discussed Advil and negative impact on kidneys. Instructed to hold Advil and follow conservative measures. In the interim, she continues to work on healthier diet choices. She does not check blood pressure at home. She could not tolerate allopurinol due to stomach issues. She is adherent to blood  pressure medications but she missed the blood pressure medication yesterday. Blood pressure is elevated today. Repeat blood work showed stable poor GFR. Kidney ultrasound with renal atrophy. Magi came today with her . She reports occasional bilateral flank pain and she is concerned if she has UTI. She will get her booster Covid vaccine as well as flu shot soon. No NSAID use at home. She still has bad knee arthritis and she follows with orthopedic surgery. Recently she started using a walker which is helping her. No urinary tract symptoms, burning or pain with urination only occasional flank pain. No excessive leg swelling or nausea or vomiting. She successfully lost about 10 pounds since last visit        Per prior notes  Magi is coming today with her . He reports knowing about CKD 4 for few years. She recently was undergoing knee replacement therapy and she was asked to get renal clearance due to noticed worsening kidney function. She reports consuming 6 pills of 200 mg Advil daily (total dose of 1200 mg) for the last 5-6 years. Earlier, she had a history of GI bleed due to NSAID use and drop in hemoglobin for which she received a blood transfusion.     She reports making less urine recently. She is not a good water drinker. She does not limit salt intake. Recently she was instructed to lose weight for surgery. She started following [employer] where she gets healthier low-calorie food at home in the last 2 months with appropriate weight loss about 10 pounds. She does not take any herbal supplements or use any chemicals to lose weight. She denies burning, pain or blood in urine. She has noticed that her urine has become thicker recently. No significant leg swelling. Blood pressure is in good control recently. No history of diabetes. She started oxybutynin recently for overactive bladder with significant improvement     Social:  and lives with her . She she used to work as a  " in the [employer], no smoking or wine  Family history: 4 brothers with history of CABG, mother  of heart issues and CABG, sister with bladder cancer  at the age of 70, brother  with heart problems. No dialysis history     Review of Systems     Constitutional: +fatigue, no fever,~no chills,~no recent weight gain~and~no recent weight loss.   Eyes: no blurred vision~and~no diplopia.   ENT: no hearing loss,~no earache,~no sore throat,~no swollen glands in the neck~and~no nasal discharge.   Cardiovascular: no chest pain,~no palpitations~and~ + lower extremity edema.   Respiratory: no shortness of breath,~no chronic cough~and~no shortness of breath during exertion.   Gastrointestinal: no diarrhea, but~no abdominal pain,~no constipation,~no heartburn,~no vomiting,~no bloody stools~and~no change in bowel movements.   Genitourinary: no dysuria~and~no hematuria.   Musculoskeletal: no arthralgias~and~no myalgias.   Skin: no rashes~and~no skin lesions.   Neurological: + headaches~and~no dizziness.   Psychiatric: no confusion,~no depression~and~no anxiety.   Endocrine: no heat intolerance,~no cold intolerance,~appetite not increased,~no thyroid disorder,~no increased urinary frequency~and~no dry skin.   Hematologic/Lymphatic: does not bleed easily~and~does not bruise easily.   All other systems have been reviewed and are negative for complaint.       Objective   BP (!) 166/101 (Patient Position: Standing)   Temp 36.1 °C (96.9 °F)   Resp 16   Ht 1.676 m (5' 6\")   Wt 110 kg (241 lb 9.6 oz)   SpO2 95%   BMI 39.00 kg/m²   Wt Readings from Last 3 Encounters:   23 110 kg (241 lb 9.6 oz)   23 110 kg (243 lb)   23 111 kg (245 lb)       Physical Exam    General appearance: no distress awake and alert on room air, euvolemic on exam  Eyes: non-icteric  HEENT: atrumatic head, PEERLA, moist mucosa  Skin: no apparent rash  Heart: NSR, S1, S2 normal, no murmur or gallop  Lungs: Symmetrical " "expansion,CTA bilat no wheezing/crackles  Abdomen: soft, nt/nd, obese  Extremities: edema bilat  Neuro: No FND,asterixis, no focal deficits noticed        Data Review        Results from last 7 days   Lab Units 11/04/23  1043   WBC AUTO x10*3/uL 4.7   HEMOGLOBIN g/dL 11.3*   HEMATOCRIT % 34.3*   PLATELETS AUTO x10*3/uL 176        Results from last 7 days   Lab Units 11/04/23  1043   SODIUM mmol/L 137   POTASSIUM mmol/L 4.0   CHLORIDE mmol/L 103   CO2 mmol/L 25   BUN mg/dL 44*   CREATININE mg/dL 3.03*   EGFR mL/min/1.73m*2 15*   GLUCOSE mg/dL 91   CALCIUM mg/dL 9.6   PHOSPHORUS mg/dL 3.8       Lab Results   Component Value Date    URICACID 3.9 11/04/2023       No components found for: \"[id number]\"      No results found for: \"HGBA1C\"      Lab Results   Component Value Date    CALCIUM 9.6 11/04/2023    PHOS 3.8 11/04/2023         No results found for requested labs within last 365 days.        Results from last 7 days   Lab Units 11/04/23  1043   SODIUM mmol/L 137   POTASSIUM mmol/L 4.0   CHLORIDE mmol/L 103   CO2 mmol/L 25   BUN mg/dL 44*   GLUCOSE mg/dL 91   CALCIUM mg/dL 9.6   ANION GAP mmol/L 13   EGFR mL/min/1.73m*2 15*             Albumin/Creatine Ratio   Date Value Ref Range Status   11/04/2023 24.3 <30.0 ug/mg Creat Final   07/13/2023 SEE COMMENT 0.0 - 30.0 ug/mg crt Final     Comment:     One or more analytes used in this calculation   is outside of the analytical measurement range.  Calculation cannot be performed.     04/13/2023 12.2 0.0 - 30.0 ug/mg crt Final       Assessment and Plan     Ms. Boyd is a 77-year-old white female with past medical history of hypertension, hyperlipidemia, knee osteoarthritis, hypothyroidism, overactive bladder, asthma, remote history of GI bleed, heavy NSAID use, asthma and CKD who is coming today for advanced CKD follow-up     Impression     # Chronic kidney disease -G4/A1 - worsening Cr   - Baseline SCr 2-2.5, GFR 15-20 mils per minute per 1.73 mÃ‚Â² (new baseline)  - " Most recent serum creatinine 3.0, GFR 15 ml/min/ 1.73 m in 11/2023   - Most likely related to atherosclerotic cardiovascular disease and significant history of NSAID use  -Negative spot test albumin creatinine ratio  -Electrolytes: Acceptable potassium, albumin and no significant acidosis.  -Previous kidney U/S August 2021 showed shrunk right kidney 8.5 cm with increased echogenicity, left kidney 10.4 cm. No stones or masses  -Declined kidney transplant evaluation  -Not a good candidate for AV fistula. Arteriovenous graft as needed per vascular surgery, discussed this with pt.      #Urinary incontinence, new onset increased frequency x <1 wk  - preexisting, stopped oxybutynin bid and switched to trospium per urology   - (+) increased frequency q2-3 hrs with preexisting urge incontinence, (+) pressure; (-) burning, pain, foul odor  -Sees urology, had abx and cystoscopy and will see pt 1/2024.      #BLE edema, possible lymphedema  -Pt didn't make it to lymphedema clinic last time. Plan for another referral to lymphedema clinic once things are more sorted out.      # Blood Pressure  - Today BP elevated 160s/100s and pt did not check at home   - Current meds: Atenolol 100mg qd, Furosemide 20 mg qd  - Continue Lasix 20 mg qd.   - Instructed to check blood pressure at home     #Anemia  - Hgb 10-11, improved from prior 11/2023  - Iron saturation 28% improved from prior   - prior labs showed iron deficiency anemia  - Pt continues with PO iron BID.      #BMD  - Calcium, phosphorus, albumin within reference values     # CVS-high risk  - On statins     #Uric Acid  - Previously unable to tolerate allopurinol due to GI issues.   - Doing well on febuxostat (started 10/2021), most recent 3-4 mg/dl  - Continue current regimen     General CKD recommendations:  - Avoid NSAIDS  - Avoid contrast as possible. If needed, hold ACEi/ARB/diuretic 24 hours prior to exposure, ensure appropriate hydration. For inpatient, consider IVF prior to  and after  - Diet: limit salt, avoid processed foods  - For nausea/vomiting/ ulcer protection, prefer avoid PPI, H2 blocker, sucralfate okay.  - Tobacco cessation     Follow up 3 months with repeat blood work and urine analysis, iron studies, CBC, RFP prior.     Anya Gregorio DO PGY-2 MELANIE Narayan MD, MS, TATYANA ROSARIO  Clinical  - [facility] [employer] [facility] [employer]  Nephrologist - [facility]

## 2023-11-08 NOTE — PATIENT INSTRUCTIONS
Dear CHANDNI   It was nice seeing you in the nephrology clinic today     Today we discussed the following:     #Chronic kidney disease stage 4, 15-20% no need to start dialysis yet. Kidney function is stable at this time  #Hypertension: Blood pressure is elevated today.  Check blood pressure at home and bring blood pressure log with you next visit-target blood pressure 110-130/70-90.   #Uric acid is improved a lot-continue febuxostat once daily-refill today  #Leg swelling-was concerned about lymphedema-we will consider referral to lymphedema clinic when you are broken feet heals  #If you need dialysis we will get graft inserted-will let the vascular surgeon know when we needed  #Urine frequency and recent incontinence-follow with urology  #Iron deficiency anemia-improved significantly-continue oral iron        I will see you again in 3 months with another set of blood work prior to visit     Please call our office if you have any question  Thank you for coming to see me today     Laura Narayan MD, MS, TATYANA ROSARIO  Clinical  - [facility] [employer]  Nephrologist - [facility]

## 2023-11-15 ENCOUNTER — APPOINTMENT (OUTPATIENT)
Dept: PRIMARY CARE | Facility: CLINIC | Age: 78
End: 2023-11-15
Payer: MEDICARE

## 2023-12-02 DIAGNOSIS — E03.9 HYPOTHYROIDISM, UNSPECIFIED TYPE: ICD-10-CM

## 2023-12-04 RX ORDER — LEVOTHYROXINE SODIUM 75 UG/1
75 TABLET ORAL DAILY
Qty: 90 TABLET | Refills: 1 | Status: SHIPPED | OUTPATIENT
Start: 2023-12-04

## 2024-01-03 DIAGNOSIS — N32.81 OAB (OVERACTIVE BLADDER): Primary | ICD-10-CM

## 2024-01-03 RX ORDER — TROSPIUM CHLORIDE ER 60 MG/1
60 CAPSULE ORAL DAILY
Qty: 90 CAPSULE | Refills: 0 | Status: SHIPPED | OUTPATIENT
Start: 2024-01-03 | End: 2024-04-01 | Stop reason: WASHOUT

## 2024-01-04 DIAGNOSIS — E55.9 VITAMIN D DEFICIENCY: Primary | ICD-10-CM

## 2024-01-04 RX ORDER — ERGOCALCIFEROL 1.25 MG/1
1 CAPSULE ORAL
Qty: 12 CAPSULE | Refills: 3 | Status: SHIPPED | OUTPATIENT
Start: 2024-01-04 | End: 2025-01-03

## 2024-01-09 ENCOUNTER — OFFICE VISIT (OUTPATIENT)
Dept: PODIATRY | Facility: CLINIC | Age: 79
End: 2024-01-09
Payer: MEDICARE

## 2024-01-09 DIAGNOSIS — I73.9 PVD (PERIPHERAL VASCULAR DISEASE) (CMS-HCC): ICD-10-CM

## 2024-01-09 DIAGNOSIS — M79.671 PAIN IN BOTH FEET: ICD-10-CM

## 2024-01-09 DIAGNOSIS — I73.9 PAD (PERIPHERAL ARTERY DISEASE) (CMS-HCC): Primary | ICD-10-CM

## 2024-01-09 DIAGNOSIS — R60.9 EDEMA, UNSPECIFIED TYPE: ICD-10-CM

## 2024-01-09 DIAGNOSIS — M79.672 PAIN IN BOTH FEET: ICD-10-CM

## 2024-01-09 PROCEDURE — 1036F TOBACCO NON-USER: CPT | Performed by: PODIATRIST

## 2024-01-09 PROCEDURE — 1160F RVW MEDS BY RX/DR IN RCRD: CPT | Performed by: PODIATRIST

## 2024-01-09 PROCEDURE — 1159F MED LIST DOCD IN RCRD: CPT | Performed by: PODIATRIST

## 2024-01-09 PROCEDURE — 99212 OFFICE O/P EST SF 10 MIN: CPT | Performed by: PODIATRIST

## 2024-01-09 NOTE — PROGRESS NOTES
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    GERD (gastroesophageal reflux disease) 04/20/2023    Lower urinary tract symptoms 04/20/2023    Obesity, unspecified 08/01/2022    Class 2 obesity with body mass index (BMI) of 39.0 to 39.9 in adult    Otalgia, left ear 02/18/2020    Earache, left    Other abnormal glucose 09/29/2016    Abnormal glucose    Other specified abnormal findings of blood chemistry 10/05/2016    Abnormal thyroid blood test    Other specified disorders of ear, unspecified ear 03/18/2015    Fullness in ear    Pelvic and perineal pain 10/24/2019    Pelvic pressure in female    Personal history of other diseases of the respiratory system 12/05/2013    History of acute bronchitis    Personal history of other diseases of the respiratory system 03/18/2015    History of acute sinusitis    Personal history of other diseases of the respiratory system 12/17/2019    History of upper respiratory infection    Personal history of other drug therapy 10/25/2019    History of influenza vaccination    Personal history of other endocrine, nutritional and metabolic disease     History of dehydration    Personal history of other specified conditions 09/26/2013    History of fatigue    Personal history of other specified conditions 10/29/2013    History of shortness of breath    Personal history of other specified conditions 02/18/2020     History of nasal congestion    Personal history of other specified conditions 02/18/2020    History of postnasal drip    Personal history of other specified conditions 02/09/2015    History of diarrhea    Personal history of urinary (tract) infections 12/18/2018    History of urinary tract infection    Persons encountering health services in other specified circumstances 10/25/2019    Encounter to establish care    Right knee pain 04/20/2023    Simple chronic bronchitis (CMS/HCC) 08/23/2023    SOB (shortness of breath) 08/23/2023    Toe pain 08/23/2023    Unspecified abnormal findings in urine 08/25/2021    Abnormal urinalysis    Urinary tract infection, site not specified 10/18/2021    Acute lower UTI    Urinary tract infection, site not specified 08/30/2022    Acute UTI       Medications and Allergies have been reviewed.    Review Of Systems:  GENERAL: No weight loss, malaise or fevers.  HEENT: Negative for frequent or significant headaches,   RESPIRATORY: Negative for cough, wheezing or shortness of breath.  CARDIOVASCULAR: Negative for chest pain, leg swelling or palpitations.    Physical Exam:  Vascular exam: Dorsalis pedis and Posterior Tibial pulses palpable 2/4 bilateral. Capillary refill time less than 3 seconds b/l. Hair growth noted to digits. +2 b/l LE edema noted. Skin temp warm to warm from proximal to distal b/l. + varicosities noted.     Neurologic exam: Gross sensation present b/l. No neuro deficits noted b/l      Musculoskeletal exam: Muscle strength 5/5 for all major muscle groups tested in the lower extremity b/l. No gross deformities noted b/l.      Dermatologic exam: Nails 1-5 b/l are thickened, elongated and discolored. Webspaces 1-4 b/l are clean, dry and intact. No primary or secondary skin lesions noted. No open lesions or wounds noted at this time .    1. PAD (peripheral artery disease) (CMS/[facility])          1. PAD (peripheral artery disease) (CMS/[facility])        2. Edema, unspecified type         3. Pain in both feet        4. PVD (peripheral vascular disease) (CMS/HCC)            Patient exam and eval  Discussed swelling  Would really benefit from elevating and some compression socks  Pt states she has and does not like to wear  Recommend to re eval shoes that are proper fitting  Recommend looking in to mens  Fu in a few mos  Sooner if any new prob arise.     Kirstie Martino DPM  [phone]  [facility]  Fax: [phone]

## 2024-01-23 ENCOUNTER — TELEPHONE (OUTPATIENT)
Dept: PODIATRY | Facility: CLINIC | Age: 79
End: 2024-01-23
Payer: MEDICARE

## 2024-01-25 ENCOUNTER — APPOINTMENT (OUTPATIENT)
Dept: UROLOGY | Facility: CLINIC | Age: 79
End: 2024-01-25
Payer: MEDICARE

## 2024-01-29 ENCOUNTER — APPOINTMENT (OUTPATIENT)
Dept: PRIMARY CARE | Facility: CLINIC | Age: 79
End: 2024-01-29
Payer: MEDICARE

## 2024-01-31 ENCOUNTER — OFFICE VISIT (OUTPATIENT)
Dept: PRIMARY CARE | Facility: CLINIC | Age: 79
End: 2024-01-31
Payer: MEDICARE

## 2024-01-31 VITALS
HEIGHT: 66 IN | SYSTOLIC BLOOD PRESSURE: 131 MMHG | OXYGEN SATURATION: 94 % | DIASTOLIC BLOOD PRESSURE: 84 MMHG | BODY MASS INDEX: 39 KG/M2 | HEART RATE: 77 BPM

## 2024-01-31 DIAGNOSIS — L03.119 CELLULITIS OF LOWER EXTREMITY, UNSPECIFIED LATERALITY: ICD-10-CM

## 2024-01-31 DIAGNOSIS — I89.0 LYMPHEDEMA: ICD-10-CM

## 2024-01-31 DIAGNOSIS — N18.6 ESRD (END STAGE RENAL DISEASE) ON DIALYSIS (MULTI): Primary | ICD-10-CM

## 2024-01-31 DIAGNOSIS — Z99.2 ESRD (END STAGE RENAL DISEASE) ON DIALYSIS (MULTI): Primary | ICD-10-CM

## 2024-01-31 DIAGNOSIS — R05.1 ACUTE COUGH: ICD-10-CM

## 2024-01-31 PROCEDURE — 1160F RVW MEDS BY RX/DR IN RCRD: CPT | Performed by: NURSE PRACTITIONER

## 2024-01-31 PROCEDURE — 1159F MED LIST DOCD IN RCRD: CPT | Performed by: NURSE PRACTITIONER

## 2024-01-31 PROCEDURE — 1036F TOBACCO NON-USER: CPT | Performed by: NURSE PRACTITIONER

## 2024-01-31 PROCEDURE — 99214 OFFICE O/P EST MOD 30 MIN: CPT | Performed by: NURSE PRACTITIONER

## 2024-01-31 PROCEDURE — 3075F SYST BP GE 130 - 139MM HG: CPT | Performed by: NURSE PRACTITIONER

## 2024-01-31 PROCEDURE — 3079F DIAST BP 80-89 MM HG: CPT | Performed by: NURSE PRACTITIONER

## 2024-01-31 RX ORDER — MUPIROCIN 20 MG/G
OINTMENT TOPICAL
Qty: 30 G | Refills: 1 | Status: SHIPPED | OUTPATIENT
Start: 2024-01-31 | End: 2024-02-10

## 2024-01-31 RX ORDER — BENZONATATE 100 MG/1
100 CAPSULE ORAL 3 TIMES DAILY PRN
Qty: 42 CAPSULE | Refills: 0 | Status: SHIPPED | OUTPATIENT
Start: 2024-01-31 | End: 2024-03-01

## 2024-01-31 ASSESSMENT — ENCOUNTER SYMPTOMS: HEADACHES: 1

## 2024-01-31 NOTE — PROGRESS NOTES
"Subjective   Patient ID: Magi Boyd is a 78 y.o. female who presents for Follow-up (Patient states that she is stage 5 kidney disease. She starts dialysis next month. Patient has also been having an ongoing cough, patient states she fell in August causing her to fracture her right foot and she is still having right heel pain. ), Med Refill, and Headache.    78-year-old very friendly and pleasant female accompanied by her .  The 2 of them are very protective of each other laughing and joking.    She recently saw uro- chronic UTI and frequency. Improving. Scheduled for Feb for follow-up appointment  CKD- follows closely with renal.  Patient  states they are talking about starting dialysis.   wants to know if dialysis can be done at home.  I said to discuss this with nephrology  HTN- renal manages.  She has the availability to monitor at home.  She is on multiple medications.  Recent rt foot FX. Still with pain in the heel. Sees podiatry.  Has an upcoming appointment scheduled    Problems with On and off cough and feels bronchial. HX asthma on meds.  Patient and  feel like there is a lot of fluid in her chest and she just cannot get rid of it.  Chronic bilateral edema bilat LE with blisters and redness.       Med Refill  Associated symptoms include headaches.   Headache          Review of Systems   Neurological:  Positive for headaches.       Objective   /84   Pulse 77   Ht 1.676 m (5' 6\")   SpO2 94%   BMI 39.00 kg/m²     Physical Exam  Vitals and nursing note reviewed.   Constitutional:       General: She is not in acute distress.     Appearance: Normal appearance. She is normal weight.   HENT:      Head: Normocephalic and atraumatic.      Right Ear: External ear normal.      Left Ear: External ear normal.      Nose: Nose normal.      Mouth/Throat:      Mouth: Mucous membranes are dry.      Pharynx: Oropharynx is clear.   Neck:      Vascular: No carotid bruit. "   Cardiovascular:      Rate and Rhythm: Normal rate and regular rhythm.      Pulses: Normal pulses.      Heart sounds: Normal heart sounds.   Pulmonary:      Effort: Pulmonary effort is normal.      Breath sounds: Rhonchi present.      [place] [facility] [place]  Musculoskeletal:      Cervical back: Normal range of motion and neck supple.      Right lower leg: Edema (+3-4 pitting edema with thickened sking) present.      Left lower leg: Edema (+3 pitting edema, inflamed with intact fluid filled blisters.) present.   Lymphadenopathy:      Cervical: No cervical adenopathy.   Skin:     General: Skin is warm and dry.      Capillary Refill: Capillary refill takes less than 2 seconds.   Neurological:      Mental Status: She is alert and oriented to person, place, and time.   Psychiatric:         Mood and Affect: Mood normal.         Behavior: Behavior normal.         Thought Content: Thought content normal.         Judgment: Judgment normal.         Assessment/Plan   Diagnoses and all orders for this visit:  ESRD (end stage renal disease) on dialysis (CMS/[facility])  Comments:  This may be the cause of the lower extremity pain, edema, cough, shortness of breath and fatigue  Cellulitis of lower extremity, unspecified laterality  Comments:  Acute on chronic in appearance.  Apply topical Bactroban twice daily x 7 days I think is related to the amount of fluid in her legs  Orders:  -     mupirocin (Bactroban) 2 % ointment; Apply topically 3 times a day for 10 days.  -     Referral to Wound Clinic; Future  Acute cough  Comments:  Very moist nonproductive cough.  I do not hear any wheezing I feel like this is related to fluid status.  I asked that she call nephrology  Orders:  -     benzonatate (Tessalon) 100 mg capsule; Take 1 capsule (100 mg) by mouth 3 times a day as needed for cough. Do not crush or chew.  Lymphedema  Comments:  May benefit from wound clinic  Orders:  -     Referral to Wound Clinic; Future    She has a lot of significant  health history but she has a specialist for each diagnosis I asked that she continue follow-up with her specialists.  Let them manage her chronic health concerns  I asked that she call nephrology to let them know about the shortness of breath and cough and the lower extremity edema  In the meantime I put her on benzonatate 100 mg p.o. 3 times daily as needed cough

## 2024-02-02 ENCOUNTER — LAB (OUTPATIENT)
Dept: LAB | Facility: LAB | Age: 79
End: 2024-02-02
Payer: MEDICARE

## 2024-02-02 DIAGNOSIS — N32.81 OAB (OVERACTIVE BLADDER): ICD-10-CM

## 2024-02-02 DIAGNOSIS — I12.9 BENIGN HYPERTENSION WITH CKD (CHRONIC KIDNEY DISEASE) STAGE IV (MULTI): ICD-10-CM

## 2024-02-02 DIAGNOSIS — N18.5 CKD (CHRONIC KIDNEY DISEASE), STAGE V (MULTI): ICD-10-CM

## 2024-02-02 DIAGNOSIS — N18.4 BENIGN HYPERTENSION WITH CKD (CHRONIC KIDNEY DISEASE) STAGE IV (MULTI): ICD-10-CM

## 2024-02-02 LAB
ALBUMIN SERPL BCP-MCNC: 3.7 G/DL (ref 3.4–5)
ANION GAP SERPL CALC-SCNC: 11 MMOL/L (ref 10–20)
BUN SERPL-MCNC: 40 MG/DL (ref 6–23)
CALCIUM SERPL-MCNC: 10.1 MG/DL (ref 8.6–10.3)
CHLORIDE SERPL-SCNC: 99 MMOL/L (ref 98–107)
CO2 SERPL-SCNC: 28 MMOL/L (ref 21–32)
CREAT SERPL-MCNC: 2.52 MG/DL (ref 0.5–1.05)
CREAT UR-MCNC: 63.6 MG/DL (ref 20–320)
EGFRCR SERPLBLD CKD-EPI 2021: 19 ML/MIN/1.73M*2
ERYTHROCYTE [DISTWIDTH] IN BLOOD BY AUTOMATED COUNT: 13.5 % (ref 11.5–14.5)
FERRITIN SERPL-MCNC: 139 NG/ML (ref 8–150)
GLUCOSE SERPL-MCNC: 102 MG/DL (ref 74–99)
HCT VFR BLD AUTO: 34.8 % (ref 36–46)
HGB BLD-MCNC: 11.7 G/DL (ref 12–16)
IRON SATN MFR SERPL: 28 % (ref 25–45)
IRON SERPL-MCNC: 85 UG/DL (ref 35–150)
MCH RBC QN AUTO: 32.1 PG (ref 26–34)
MCHC RBC AUTO-ENTMCNC: 33.6 G/DL (ref 32–36)
MCV RBC AUTO: 95 FL (ref 80–100)
MICROALBUMIN UR-MCNC: 16.1 MG/L
MICROALBUMIN/CREAT UR: 25.3 UG/MG CREAT
NRBC BLD-RTO: 0 /100 WBCS (ref 0–0)
PHOSPHATE SERPL-MCNC: 3.3 MG/DL (ref 2.5–4.9)
PLATELET # BLD AUTO: 208 X10*3/UL (ref 150–450)
POTASSIUM SERPL-SCNC: 4.3 MMOL/L (ref 3.5–5.3)
RBC # BLD AUTO: 3.65 X10*6/UL (ref 4–5.2)
SODIUM SERPL-SCNC: 134 MMOL/L (ref 136–145)
TIBC SERPL-MCNC: 304 UG/DL (ref 240–445)
UIBC SERPL-MCNC: 219 UG/DL (ref 110–370)
WBC # BLD AUTO: 4.7 X10*3/UL (ref 4.4–11.3)

## 2024-02-02 PROCEDURE — 82043 UR ALBUMIN QUANTITATIVE: CPT

## 2024-02-02 PROCEDURE — 83550 IRON BINDING TEST: CPT

## 2024-02-02 PROCEDURE — 82570 ASSAY OF URINE CREATININE: CPT

## 2024-02-02 PROCEDURE — 36415 COLL VENOUS BLD VENIPUNCTURE: CPT

## 2024-02-02 PROCEDURE — 83540 ASSAY OF IRON: CPT

## 2024-02-02 PROCEDURE — 85027 COMPLETE CBC AUTOMATED: CPT

## 2024-02-02 PROCEDURE — 82728 ASSAY OF FERRITIN: CPT

## 2024-02-02 PROCEDURE — 80069 RENAL FUNCTION PANEL: CPT

## 2024-02-02 PROCEDURE — 83970 ASSAY OF PARATHORMONE: CPT

## 2024-02-03 LAB — PTH-INTACT SERPL-MCNC: 84.3 PG/ML (ref 18.5–88)

## 2024-02-05 ENCOUNTER — CLINICAL SUPPORT (OUTPATIENT)
Dept: WOUND CARE | Facility: HOSPITAL | Age: 79
End: 2024-02-05
Payer: MEDICARE

## 2024-02-05 DIAGNOSIS — I89.0 LYMPHEDEMA: ICD-10-CM

## 2024-02-05 DIAGNOSIS — L03.119 CELLULITIS OF LOWER EXTREMITY, UNSPECIFIED LATERALITY: ICD-10-CM

## 2024-02-05 DIAGNOSIS — I73.9 PERIPHERAL VASCULAR DISEASE OF EXTREMITY (CMS-HCC): Primary | ICD-10-CM

## 2024-02-05 PROCEDURE — 99213 OFFICE O/P EST LOW 20 MIN: CPT | Mod: 25

## 2024-02-05 PROCEDURE — 11042 DBRDMT SUBQ TIS 1ST 20SQCM/<: CPT | Mod: 25

## 2024-02-08 DIAGNOSIS — E78.2 MIXED HYPERLIPIDEMIA: ICD-10-CM

## 2024-02-08 RX ORDER — ATORVASTATIN CALCIUM 20 MG/1
20 TABLET, FILM COATED ORAL DAILY
Qty: 90 TABLET | Refills: 1 | Status: SHIPPED | OUTPATIENT
Start: 2024-02-08

## 2024-02-14 ENCOUNTER — OFFICE VISIT (OUTPATIENT)
Dept: PODIATRY | Facility: CLINIC | Age: 79
End: 2024-02-14
Payer: MEDICARE

## 2024-02-14 ENCOUNTER — HOSPITAL ENCOUNTER (OUTPATIENT)
Dept: RADIOLOGY | Facility: CLINIC | Age: 79
Discharge: HOME | End: 2024-02-14
Payer: MEDICARE

## 2024-02-14 DIAGNOSIS — I73.9 PVD (PERIPHERAL VASCULAR DISEASE) (CMS-HCC): ICD-10-CM

## 2024-02-14 DIAGNOSIS — M79.672 PAIN IN BOTH FEET: ICD-10-CM

## 2024-02-14 DIAGNOSIS — I73.9 PAD (PERIPHERAL ARTERY DISEASE) (CMS-HCC): ICD-10-CM

## 2024-02-14 DIAGNOSIS — M79.671 PAIN IN BOTH FEET: ICD-10-CM

## 2024-02-14 DIAGNOSIS — I73.9 PAD (PERIPHERAL ARTERY DISEASE) (CMS-HCC): Primary | ICD-10-CM

## 2024-02-14 PROCEDURE — 99214 OFFICE O/P EST MOD 30 MIN: CPT | Performed by: PODIATRIST

## 2024-02-14 PROCEDURE — 73630 X-RAY EXAM OF FOOT: CPT | Mod: RT

## 2024-02-14 PROCEDURE — 73610 X-RAY EXAM OF ANKLE: CPT | Mod: RIGHT SIDE | Performed by: RADIOLOGY

## 2024-02-14 PROCEDURE — 73610 X-RAY EXAM OF ANKLE: CPT | Mod: RT

## 2024-02-14 PROCEDURE — 1160F RVW MEDS BY RX/DR IN RCRD: CPT | Performed by: PODIATRIST

## 2024-02-14 PROCEDURE — 73630 X-RAY EXAM OF FOOT: CPT | Mod: RIGHT SIDE | Performed by: RADIOLOGY

## 2024-02-14 PROCEDURE — 1036F TOBACCO NON-USER: CPT | Performed by: PODIATRIST

## 2024-02-14 PROCEDURE — 1159F MED LIST DOCD IN RCRD: CPT | Performed by: PODIATRIST

## 2024-02-14 RX ORDER — TRAMADOL HYDROCHLORIDE 50 MG/1
50 TABLET ORAL EVERY 8 HOURS PRN
Qty: 10 TABLET | Refills: 0 | Status: SHIPPED | OUTPATIENT
Start: 2024-02-14 | End: 2024-02-19

## 2024-02-14 RX ORDER — DOXYCYCLINE 100 MG/1
100 CAPSULE ORAL 2 TIMES DAILY
Qty: 28 CAPSULE | Refills: 0 | Status: SHIPPED | OUTPATIENT
Start: 2024-02-14 | End: 2024-02-26 | Stop reason: ALTCHOICE

## 2024-02-14 NOTE — PROGRESS NOTES
History of Present Illness:   Patient has continued heel pain  Has been to [facility] wound care [place] for wound on right foot  States she is unable to bear weight on right foot  Has venous insuff  States pain keeps her up at night.       Past Medical History  Past Medical History:   Diagnosis Date    Acute UTI 04/20/2023    Adverse reaction to NSAIDs, sequela 04/20/2023    Benign essential hypertension 04/20/2023    Bilateral leg and foot pain 04/20/2023    Body mass index (BMI)40.0-44.9, adult 10/18/2021    Body mass index (BMI) of 40.0 to 44.9 in adult    CKD (chronic kidney disease) 04/20/2023    Disorder of both eustachian tubes 04/20/2023    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    GERD (gastroesophageal reflux disease) 04/20/2023    Lower urinary tract symptoms 04/20/2023    Obesity, unspecified 08/01/2022    Class 2 obesity with body mass index (BMI) of 39.0 to 39.9 in adult    Otalgia, left ear 02/18/2020    Earache, left    Other abnormal glucose 09/29/2016    Abnormal glucose    Other specified abnormal findings of blood chemistry 10/05/2016    Abnormal thyroid blood test    Other specified disorders of ear, unspecified ear 03/18/2015    Fullness in ear    Pelvic and perineal pain 10/24/2019    Pelvic pressure in female    Personal history of other diseases of the respiratory system 12/05/2013    History of acute bronchitis    Personal history of other diseases of the respiratory system 03/18/2015    History of acute sinusitis    Personal history of other diseases of the respiratory system 12/17/2019    History of upper respiratory infection    Personal history of other drug therapy 10/25/2019    History of influenza vaccination    Personal history of other endocrine, nutritional and metabolic disease     History of dehydration    Personal history of other specified conditions 09/26/2013    History of fatigue    Personal history of other specified conditions 10/29/2013    History of shortness of breath     Personal history of other specified conditions 02/18/2020    History of nasal congestion    Personal history of other specified conditions 02/18/2020    History of postnasal drip    Personal history of other specified conditions 02/09/2015    History of diarrhea    Personal history of urinary (tract) infections 12/18/2018    History of urinary tract infection    Persons encountering health services in other specified circumstances 10/25/2019    Encounter to establish care    Right knee pain 04/20/2023    Simple chronic bronchitis (CMS/HCC) 08/23/2023    SOB (shortness of breath) 08/23/2023    Toe pain 08/23/2023    Unspecified abnormal findings in urine 08/25/2021    Abnormal urinalysis    Urinary tract infection, site not specified 10/18/2021    Acute lower UTI    Urinary tract infection, site not specified 08/30/2022    Acute UTI       Medications and Allergies have been reviewed.    Review Of Systems:  GENERAL: No weight loss, malaise or fevers.  HEENT: Negative for frequent or significant headaches,   RESPIRATORY: Negative for cough, wheezing or shortness of breath.  CARDIOVASCULAR: Negative for chest pain, leg swelling or palpitations.    Physical Exam:  Vascular exam: Dorsalis pedis and Posterior Tibial pulses palpable 2/4 bilateral. Capillary refill time less than 3 seconds b/l. Hair growth noted to digits. +2 b/l LE edema noted. Skin temp warm to warm from proximal to distal b/l. + varicosities noted.     Neurologic exam: Gross sensation present b/l. No neuro deficits noted b/l      Musculoskeletal exam: Muscle strength 5/5 for all major muscle groups tested in the lower extremity b/l. No gross deformities noted b/l. Pain to right plantar and post heel . NO Soi noted. However tender with palpation     Dermatologic exam:  Webspaces 1-4 b/l are clean, dry and intact. No primary or secondary skin lesions noted. No open lesions or wounds noted at this time .    1. PAD (peripheral artery disease) (CMS/[facility])           1. PAD (peripheral artery disease) (CMS/HCC)        2. Edema, unspecified type        3. Pain in both feet        4. PVD (peripheral vascular disease) (CMS/[facility])            Patient exam and eval  Discussed swelling  Would really benefit from elevating and some compression socks  Pt states she has and does not like to wear  Recommend to re eval shoes that are proper fitting  Recommend looking in to mens  Called in abx and tramadol to help at night  Ordered xrays   very concerned with level of pain  Discussed walking boot possibly  Discussed not putting pressure on heels  Fu 2 weeks  Sooner if any new prob arise.     Kirstie Martino DPM  [phone]  [address]  Fax: [phone]

## 2024-02-15 ENCOUNTER — APPOINTMENT (OUTPATIENT)
Dept: NEPHROLOGY | Facility: CLINIC | Age: 79
End: 2024-02-15
Payer: MEDICARE

## 2024-02-15 ENCOUNTER — APPOINTMENT (OUTPATIENT)
Dept: WOUND CARE | Facility: HOSPITAL | Age: 79
End: 2024-02-15
Payer: MEDICARE

## 2024-02-21 ENCOUNTER — APPOINTMENT (OUTPATIENT)
Dept: WOUND CARE | Facility: HOSPITAL | Age: 79
End: 2024-02-21
Payer: MEDICARE

## 2024-02-26 ENCOUNTER — OFFICE VISIT (OUTPATIENT)
Dept: NEPHROLOGY | Facility: CLINIC | Age: 79
End: 2024-02-26
Payer: MEDICARE

## 2024-02-26 VITALS
TEMPERATURE: 97.5 F | HEART RATE: 66 BPM | HEIGHT: 63 IN | DIASTOLIC BLOOD PRESSURE: 78 MMHG | BODY MASS INDEX: 42.13 KG/M2 | SYSTOLIC BLOOD PRESSURE: 139 MMHG | OXYGEN SATURATION: 95 % | WEIGHT: 237.8 LBS | RESPIRATION RATE: 16 BRPM

## 2024-02-26 DIAGNOSIS — N18.4 BENIGN HYPERTENSION WITH CKD (CHRONIC KIDNEY DISEASE) STAGE IV (MULTI): ICD-10-CM

## 2024-02-26 DIAGNOSIS — R60.0 LOCALIZED EDEMA: Primary | ICD-10-CM

## 2024-02-26 DIAGNOSIS — I12.9 BENIGN HYPERTENSION WITH CKD (CHRONIC KIDNEY DISEASE) STAGE IV (MULTI): ICD-10-CM

## 2024-02-26 DIAGNOSIS — N32.81 OVERACTIVE BLADDER: ICD-10-CM

## 2024-02-26 PROCEDURE — 1160F RVW MEDS BY RX/DR IN RCRD: CPT | Performed by: INTERNAL MEDICINE

## 2024-02-26 PROCEDURE — 1159F MED LIST DOCD IN RCRD: CPT | Performed by: INTERNAL MEDICINE

## 2024-02-26 PROCEDURE — 3078F DIAST BP <80 MM HG: CPT | Performed by: INTERNAL MEDICINE

## 2024-02-26 PROCEDURE — 99215 OFFICE O/P EST HI 40 MIN: CPT | Performed by: INTERNAL MEDICINE

## 2024-02-26 PROCEDURE — 1036F TOBACCO NON-USER: CPT | Performed by: INTERNAL MEDICINE

## 2024-02-26 PROCEDURE — 3075F SYST BP GE 130 - 139MM HG: CPT | Performed by: INTERNAL MEDICINE

## 2024-02-26 RX ORDER — FUROSEMIDE 20 MG/1
40 TABLET ORAL DAILY
Qty: 60 TABLET | Refills: 11 | Status: ON HOLD | OUTPATIENT
Start: 2024-02-26 | End: 2024-04-11

## 2024-02-26 RX ORDER — OXYBUTYNIN CHLORIDE 5 MG/1
5 TABLET ORAL NIGHTLY
Qty: 30 TABLET | Refills: 11 | Status: SHIPPED | OUTPATIENT
Start: 2024-02-26 | End: 2024-04-13 | Stop reason: HOSPADM

## 2024-02-26 NOTE — PROGRESS NOTES
Subjective     Ms. Boyd is a 78-year-old white female with past medical history of hypertension, hyperlipidemia, knee osteoarthritis, hypothyroidism, overactive bladder, asthma, remote history of GI bleed, heavy NSAID use, asthma and CKD who is coming today for advanced CKD follow-up, now accompanied by her .    Last office visit November 2023.  Patient came today with her .  Continues to have significant leg swelling.  Reports slowing down her urine output was urine frequency at night.  She continues to be on Lasix 20 mg.  Blood pressures accepted at home with average reading 130/70.  No significant weight gain.  Kidney functions remain to be stable with GFR 19 and creatinine 2.3 improved from prior 3.  She was instructed to see vascular surgery for unhealed wound on her leg-per PCP.  She continues to feel tired and fatigued.  They both have been battling recent flu    Prior notes    Last visit July 2023. Here today with her . Since then, pt states she had a mechanical fall 3 months ago and fractured her R foot. 2 months ago, she has seen a urologist and was prescribed estradiol, abx and trospium. She had a cystoscopy and said it “looked good”. Has continued urinary frequency and incontinence, going every few hours with “slow flow”. Will see urology 1/2024.   Pt has felt headaches recently, along with feeling more tired and fatigued. States she is not sleeping well. Denies recent confusion.   BP this visit 160s/100s, does not check at home. Has continued swelling to legs and takes Lasix 20mg po daily. Continues with PO iron, discussed improved anemia.   Discussed again with pt how she is not a good candidate for fistula, plan for graft for HD. Will have close follow ups.       Per prior notes     Last office visit April 2023. In interim, she got admitted with leg cellulitis started antibiotic. She has been suffering from recurrent UTI and lower urinary tract symptoms with new incontinence  that is hard to treat. She finished 2-3 courses of antibiotic already. She was instructed to start the d-mannose per PCP. She is very frustrated because of the and urinary symptoms and she requested urology referral. Kidney function is stable. Not uremic or significantly hypervolemic on exam. Continues to have significant leg swelling which looks like lymphedema. She stopped taking oral iron as she was taking many pills at that time and she decided to quit. Discussed blood work results that showed iron deficiency anemia-she is willing to start p.o. iron. Offered starting infusion as needed. No significant albuminuria. Vitamin D is normal. Uric acid is normal-refilled febuxostat today     Her prior notes     Last office visit November 2022. In the interim she continues to have leg swelling (unilateral) concerning for DVT or cellulitis. We discussed our concerns with her today. We contacted PCP to update them. Most recent renal function stable at this time, with a serum creatinine 2.4, GFR 20 and BUN 29. She has completed an initial evaluation for AV fistula and decision for insertion of graft once the need for HD arrives. She states LE edema gets worse with food. We discussed the importance of a balanced diet, low in Na ( avoid hot dogs, noonan...) She agrees. She has lower urinary tract symptoms for which we will rule out UTI today. Although she is in good spirits, she expressed her decision not to pursue renal transplantation. Otherwise no new complaints.         Per prior notes  Last office visit April 2022. Patient came with her  today and is doing well. Was evaluated for a HD fistula but due to vasculature will need graft if/when dialysis is needed. Was sick last with with flu like symptoms but did not have a fever, was not tested for Covid-19. BP at home is in the 120s/70s but was running high today at the office. Leg swelling has slightly improved from prior. Patient is going to hold off on knee surgery.       Per prior notes     Last office visit January 2022. Today, Magi came with her . She feels in her baseline state of health. She lost her son last month for HingeID and she had to drive to [place]. She limited her fluid intake during the road trip. Next day she noticed decreased urine output. Urine output improved later and now in her baseline. She continues to follow low-salt diet. She did not need the dietitian as she supposed to. She is still deciding on the knee surgery. She did blood work 10 days ago and results were discussed with her and her  in detail today including slightly worsening serum creatinine and GFR, within normal potassium, low sodium 132. She reports occasional headache and she takes Tylenol. She has leg swelling. She is adherent to hydrochlorothiazide and rest of medications. Uric acid improved and now normal on febuxostat.  had many questions and all of them were discussed in details and answered. Overall, kidney function is slowly worsening and I anticipate starting dialysis in her lifetime. Blood pressure is elevated today     Per prior notes     Last office visit October 2021. In the interim, she continues to follow a low-salt diet. She started febuxostat for elevated uric acid with improvement in her knee pain. She did not see a dietitian and she work with one of her friends on healthier diet choices. Weight is stable. She still considering risks and benefits of knee replacement surgery and she is planned to see the orthopedic surgeon in the next few weeks or so. Blood pressure is under good control. No lower urine tract symptoms.  is concerned about limited diet options and not eating well. She agreed to start multivitamins. She reports better taste in the mouth that might be related to febuxostat. Weighing the risks versus benefits of continuing medication. Given her recommendation regarding mouthwash, chewing gums, ice cubes in the mouth etc. Overall  she is stable. I reviewed blood work and urinalysis with her in details including stable serum creatinine, GFR, improving albuminuria, normal vitamin D, significant improvement in uric acid after starting Uloric, stable mild anemia     Per prior notes     Last office visit August 2021. We discussed Advil and negative impact on kidneys. Instructed to hold Advil and follow conservative measures. In the interim, she continues to work on healthier diet choices. She does not check blood pressure at home. She could not tolerate allopurinol due to stomach issues. She is adherent to blood pressure medications but she missed the blood pressure medication yesterday. Blood pressure is elevated today. Repeat blood work showed stable poor GFR. Kidney ultrasound with renal atrophy. Magi came today with her . She reports occasional bilateral flank pain and she is concerned if she has UTI. She will get her booster Covid vaccine as well as flu shot soon. No NSAID use at home. She still has bad knee arthritis and she follows with orthopedic surgery. Recently she started using a walker which is helping her. No urinary tract symptoms, burning or pain with urination only occasional flank pain. No excessive leg swelling or nausea or vomiting. She successfully lost about 10 pounds since last visit        Per prior notes  Magi is coming today with her . He reports knowing about CKD 4 for few years. She recently was undergoing knee replacement therapy and she was asked to get renal clearance due to noticed worsening kidney function. She reports consuming 6 pills of 200 mg Advil daily (total dose of 1200 mg) for the last 5-6 years. Earlier, she had a history of GI bleed due to NSAID use and drop in hemoglobin for which she received a blood transfusion.     She reports making less urine recently. She is not a good water drinker. She does not limit salt intake. Recently she was instructed to lose weight for surgery. She  started following [employer] where she gets healthier low-calorie food at home in the last 2 months with appropriate weight loss about 10 pounds. She does not take any herbal supplements or use any chemicals to lose weight. She denies burning, pain or blood in urine. She has noticed that her urine has become thicker recently. No significant leg swelling. Blood pressure is in good control recently. No history of diabetes. She started oxybutynin recently for overactive bladder with significant improvement     Social:  and lives with her . She she used to work as a  in the [employer], no smoking or wine  Family history: 4 brothers with history of CABG, mother  of heart issues and CABG, sister with bladder cancer  at the age of 70, brother  with heart problems. No dialysis history     Review of Systems     Constitutional: +fatigue, no fever,~no chills,~no recent weight gain~and~no recent weight loss.   Eyes: no blurred vision~and~no diplopia.   ENT: no hearing loss,~no earache,~no sore throat,~no swollen glands in the neck~and~no nasal discharge.   Cardiovascular: no chest pain,~no palpitations~and~ + lower extremity edema.   Respiratory: no shortness of breath,~no chronic cough~and~no shortness of breath during exertion.   Gastrointestinal: no diarrhea, but~no abdominal pain,~no constipation,~no heartburn,~no vomiting,~no bloody stools~and~no change in bowel movements.   Genitourinary: no dysuria~and~no hematuria.   Musculoskeletal: no arthralgias~and~no myalgias.   Skin: no rashes~and~no skin lesions.   Neurological: + headaches~and~no dizziness.   Psychiatric: no confusion,~no depression~and~no anxiety.   Endocrine: no heat intolerance,~no cold intolerance,~appetite not increased,~no thyroid disorder,~no increased urinary frequency~and~no dry skin.   Hematologic/Lymphatic: does not bleed easily~and~does not bruise easily.   All other systems have been reviewed and are negative for  "complaint.       Objective   /78 (BP Location: Right arm, Patient Position: Sitting, BP Cuff Size: Large adult)   Pulse 66   Temp 36.4 °C (97.5 °F)   Resp 16   Ht 1.6 m (5' 3\")   Wt 108 kg (237 lb 12.8 oz)   SpO2 95%   BMI 42.12 kg/m²   Wt Readings from Last 3 Encounters:   02/26/24 108 kg (237 lb 12.8 oz)   11/08/23 110 kg (241 lb 9.6 oz)   08/23/23 110 kg (243 lb)       Physical Exam    General appearance: no distress awake and alert on room air, hypervolemic on exam  Eyes: non-icteric  HEENT: atrumatic head, PEERLA, moist mucosa  Skin: no apparent rash  Heart: NSR, S1, S2 normal, no murmur or gallop  Lungs: Symmetrical expansion,CTA bilat no wheezing/crackles  Abdomen: soft, nt/nd, obese  Extremities: edema bilat  Neuro: No FND,asterixis, no focal deficits noticed        Data Review                       No lab exists for component: \"LABALBU\"      Lab Results   Component Value Date    URICACID 3.9 11/04/2023       No components found for: \"[id number]\"      No results found for: \"HGBA1C\"      Lab Results   Component Value Date    CALCIUM 10.1 02/02/2024    PHOS 3.3 02/02/2024         No results found for requested labs within last 365 days.              No lab exists for component: \"CR\", \"PHOSPHORUS\"            Albumin/Creatine Ratio   Date Value Ref Range Status   02/02/2024 25.3 <30.0 ug/mg Creat Final   11/04/2023 24.3 <30.0 ug/mg Creat Final   07/13/2023 SEE COMMENT 0.0 - 30.0 ug/mg crt Final     Comment:     One or more analytes used in this calculation   is outside of the analytical measurement range.  Calculation cannot be performed.         Assessment and Plan     Ms. Boyd is a 77-year-old white female with past medical history of hypertension, hyperlipidemia, knee osteoarthritis, hypothyroidism, overactive bladder, asthma, remote history of GI bleed, heavy NSAID use, asthma and CKD who is coming today for advanced CKD follow-up     Impression     # Chronic kidney disease -G4/A1 - " worsening Cr   - Baseline SCr 2-2.5, GFR 15-20 mils per minute per 1.73 mÃ‚Â² (new baseline)  - Most recent serum creatinine 2.3, GFR 19 ml/min/ 1.73 m in 11/2023   - Most likely related to atherosclerotic cardiovascular disease and significant history of NSAID use  -Negative spot test albumin creatinine ratio  -Electrolytes: Acceptable potassium, albumin and no significant acidosis.  -Previous kidney U/S August 2021 showed shrunk right kidney 8.5 cm with increased echogenicity, left kidney 10.4 cm. No stones or masses  -Declined kidney transplant evaluation  -Not a good candidate for AV fistula. Arteriovenous graft as needed per vascular surgery, discussed this with pt.      #Urinary incontinence, new onset increased frequency x <1 wk  - preexisting, currently on trospium per urology.  Will add oxybutynin 5 mg  - (+) increased frequency q2-3 hrs with preexisting urge incontinence, (+) pressure; (-) burning, pain, foul odor       #BLE edema, possible lymphedema  -Pt didn't make it to lymphedema clinic last time. Plan for another referral to lymphedema clinic once things are more sorted out.  Will increase Lasix from 20 up to 40 mg daily     # Blood Pressure  - Today BP is accepted.  Home blood pressure checks average 130/60- Current meds: Atenolol 100mg qd, Furosemide 20 mg qd  -Increase Lasix 40 mg daily for leg swelling  - Instructed to check blood pressure at home     #Anemia  - Hgb 10-11, improved  - Iron saturation 28% improved from prior   - prior labs showed iron deficiency anemia  - Pt continues with PO iron BID.      #BMD  - Calcium, phosphorus, albumin within reference values     # CVS-high risk  - On statins     #Uric Acid  - Previously unable to tolerate allopurinol due to GI issues.   - Doing well on febuxostat (started 10/2021), most recent uric acid 3-4 mg/dl  - Continue current regimen     General CKD recommendations:  - Avoid NSAIDS  - Avoid contrast as possible. If needed, hold ACEi/ARB/diuretic 24  hours prior to exposure, ensure appropriate hydration. For inpatient, consider IVF prior to and after  - Diet: limit salt, avoid processed foods  - For nausea/vomiting/ ulcer protection, prefer avoid PPI, H2 blocker, sucralfate okay.  - Tobacco cessation     Follow up 3 months with repeat blood work and urine analysis, iron studies, CBC, RFP prior.          Laura Narayan MD, MS, TATYANA ROSARIO  Clinical  - [facility] [employer]  Nephrologist - [facility] - [facility]

## 2024-02-26 NOTE — PATIENT INSTRUCTIONS
Dear CHANDNI   It was nice seeing you in the nephrology clinic today     Today we discussed the following:     #Chronic kidney disease stage 4, 15-20% no need to start dialysis yet. Kidney function is stable at this time  #Hypertension: Blood pressure is in target at home-continue same  #Uric acid is improved a lot-continue febuxostat once daily-refill today  #Leg swelling and improper wound healing-will increase Lasix from 20 up to 40 mg daily.  Expect kidney function to slightly worsen  #If you need dialysis we will get graft inserted-will let the vascular surgeon know when we needed  #Urine frequency and recent incontinence-trial of oxybutynin  #Iron deficiency anemia-improved significantly-continue oral iron        I will see you again in 3 months with another set of blood work prior to visit     Please call our office if you have any question  Thank you for coming to see me today     Laura Narayan MD, MS, TATYANA ROSARIO  Clinical  - [facility] [employer]  Nephrologist - [facility]

## 2024-02-27 ENCOUNTER — CLINICAL SUPPORT (OUTPATIENT)
Dept: WOUND CARE | Facility: HOSPITAL | Age: 79
End: 2024-02-27
Payer: MEDICARE

## 2024-02-27 PROCEDURE — 99212 OFFICE O/P EST SF 10 MIN: CPT

## 2024-02-28 ENCOUNTER — APPOINTMENT (OUTPATIENT)
Dept: WOUND CARE | Facility: HOSPITAL | Age: 79
End: 2024-02-28
Payer: MEDICARE

## 2024-02-28 ENCOUNTER — OFFICE VISIT (OUTPATIENT)
Dept: PODIATRY | Facility: CLINIC | Age: 79
End: 2024-02-28
Payer: MEDICARE

## 2024-02-28 DIAGNOSIS — I73.9 PAD (PERIPHERAL ARTERY DISEASE) (CMS-HCC): Primary | ICD-10-CM

## 2024-02-28 DIAGNOSIS — M79.672 PAIN IN BOTH FEET: ICD-10-CM

## 2024-02-28 DIAGNOSIS — R60.9 EDEMA, UNSPECIFIED TYPE: ICD-10-CM

## 2024-02-28 DIAGNOSIS — M79.671 PAIN IN BOTH FEET: ICD-10-CM

## 2024-02-28 DIAGNOSIS — M79.671 HEEL PAIN, BILATERAL: ICD-10-CM

## 2024-02-28 DIAGNOSIS — M79.672 HEEL PAIN, BILATERAL: ICD-10-CM

## 2024-02-28 DIAGNOSIS — I73.9 PVD (PERIPHERAL VASCULAR DISEASE) (CMS-HCC): ICD-10-CM

## 2024-02-28 PROCEDURE — 1159F MED LIST DOCD IN RCRD: CPT | Performed by: PODIATRIST

## 2024-02-28 PROCEDURE — 1160F RVW MEDS BY RX/DR IN RCRD: CPT | Performed by: PODIATRIST

## 2024-02-28 PROCEDURE — 99213 OFFICE O/P EST LOW 20 MIN: CPT | Performed by: PODIATRIST

## 2024-02-28 PROCEDURE — 1036F TOBACCO NON-USER: CPT | Performed by: PODIATRIST

## 2024-03-03 NOTE — PROGRESS NOTES
History of Present Illness:   Patient has continued heel pain  Has been to [facility] wound care center for wound on right foot - states she was recently discharge from wound care.   States she had incident witharea getting knicked at wound care.   States area is .   States she is unable to bear weight on right foot  Has venous insuff  States pain keeps her up at night.       Past Medical History  Past Medical History:   Diagnosis Date    Acute UTI 04/20/2023    Adverse reaction to NSAIDs, sequela 04/20/2023    Benign essential hypertension 04/20/2023    Bilateral leg and foot pain 04/20/2023    Body mass index (BMI)40.0-44.9, adult 10/18/2021    Body mass index (BMI) of 40.0 to 44.9 in adult    CKD (chronic kidney disease) 04/20/2023    Disorder of both eustachian tubes 04/20/2023    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    GERD (gastroesophageal reflux disease) 04/20/2023    Lower urinary tract symptoms 04/20/2023    Obesity, unspecified 08/01/2022    Class 2 obesity with body mass index (BMI) of 39.0 to 39.9 in adult    Otalgia, left ear 02/18/2020    Earache, left    Other abnormal glucose 09/29/2016    Abnormal glucose    Other specified abnormal findings of blood chemistry 10/05/2016    Abnormal thyroid blood test    Other specified disorders of ear, unspecified ear 03/18/2015    Fullness in ear    Pelvic and perineal pain 10/24/2019    Pelvic pressure in female    Personal history of other diseases of the respiratory system 12/05/2013    History of acute bronchitis    Personal history of other diseases of the respiratory system 03/18/2015    History of acute sinusitis    Personal history of other diseases of the respiratory system 12/17/2019    History of upper respiratory infection    Personal history of other drug therapy 10/25/2019    History of influenza vaccination    Personal history of other endocrine, nutritional and metabolic disease     History of dehydration    Personal history of other  specified conditions 09/26/2013    History of fatigue    Personal history of other specified conditions 10/29/2013    History of shortness of breath    Personal history of other specified conditions 02/18/2020    History of nasal congestion    Personal history of other specified conditions 02/18/2020    History of postnasal drip    Personal history of other specified conditions 02/09/2015    History of diarrhea    Personal history of urinary (tract) infections 12/18/2018    History of urinary tract infection    Persons encountering health services in other specified circumstances 10/25/2019    Encounter to establish care    Right knee pain 04/20/2023    Simple chronic bronchitis (CMS/HCC) 08/23/2023    SOB (shortness of breath) 08/23/2023    Toe pain 08/23/2023    Unspecified abnormal findings in urine 08/25/2021    Abnormal urinalysis    Urinary tract infection, site not specified 10/18/2021    Acute lower UTI    Urinary tract infection, site not specified 08/30/2022    Acute UTI       Medications and Allergies have been reviewed.    Review Of Systems:  GENERAL: No weight loss, malaise or fevers.  HEENT: Negative for frequent or significant headaches,   RESPIRATORY: Negative for cough, wheezing or shortness of breath.  CARDIOVASCULAR: Negative for chest pain, leg swelling or palpitations.    Physical Exam:  Vascular exam: Dorsalis pedis and Posterior Tibial pulses palpable 2/4 bilateral. Capillary refill time less than 3 seconds b/l. Hair growth noted to digits. +2 b/l LE edema noted. Skin temp warm to warm from proximal to distal b/l. + varicosities noted.     Neurologic exam: Gross sensation present b/l. No neuro deficits noted b/l      Musculoskeletal exam: Muscle strength 5/5 for all major muscle groups tested in the lower extremity b/l. No gross deformities noted b/l. Pain to right plantar and post heel . NO Soi noted. However tender with palpation     Dermatologic exam:  Webspaces 1-4 b/l are clean, dry and  intact. No primary or secondary skin lesions noted. No open lesions or wounds noted at this time .    1. PAD (peripheral artery disease) (CMS/[facility])          1. PAD (peripheral artery disease) (CMS/[facility])        2. Edema, unspecified type        3. Pain in both feet        4. PVD (peripheral vascular disease) (CMS/[facility])          Patient exam and eval  Discussed swelling  Would really benefit from elevating and some compression socks  Pt states she has and does not like to wear  Recommend to re eval shoes that are proper fitting  Recommend looking in to mens   very concerned with level of pain  Discussed walking boot possibly  Discussed not putting pressure on heels - offload as much as possible  Concern of pressure wound to area. Monitor where she is resting feet at home, chair vs bed vs wheel chair.   Fu approx 4 weeks  Sooner if any new prob arise.     Kirstie Martino DPM  [phone]  [facility] [address]  Fax: [phone]

## 2024-03-12 ENCOUNTER — APPOINTMENT (OUTPATIENT)
Dept: PODIATRY | Facility: CLINIC | Age: 79
End: 2024-03-12
Payer: MEDICARE

## 2024-03-13 ENCOUNTER — OFFICE VISIT (OUTPATIENT)
Dept: PODIATRY | Facility: CLINIC | Age: 79
End: 2024-03-13
Payer: MEDICARE

## 2024-03-13 DIAGNOSIS — L89.610: ICD-10-CM

## 2024-03-13 DIAGNOSIS — L97.411: Primary | ICD-10-CM

## 2024-03-13 DIAGNOSIS — M79.671 RIGHT FOOT PAIN: ICD-10-CM

## 2024-03-13 PROCEDURE — 1160F RVW MEDS BY RX/DR IN RCRD: CPT | Performed by: PODIATRIST

## 2024-03-13 PROCEDURE — 1159F MED LIST DOCD IN RCRD: CPT | Performed by: PODIATRIST

## 2024-03-13 PROCEDURE — 1036F TOBACCO NON-USER: CPT | Performed by: PODIATRIST

## 2024-03-13 PROCEDURE — 99214 OFFICE O/P EST MOD 30 MIN: CPT | Performed by: PODIATRIST

## 2024-03-13 NOTE — PROGRESS NOTES
History of Present Illness:   Patient has continued heel pain  Has been to [facility] wound care center for wound on right foot - states she was recently discharge from wound care.   States she had incident witharea getting knicked at wound care.   States area is .   States she is unable to bear weight on right foot  Has venous insuff  States pain keeps her up at night.   Very painful today, unable to move foot.    concerned of pain level.       Past Medical History  Past Medical History:   Diagnosis Date    Acute UTI 04/20/2023    Adverse reaction to NSAIDs, sequela 04/20/2023    Benign essential hypertension 04/20/2023    Bilateral leg and foot pain 04/20/2023    Body mass index (BMI)40.0-44.9, adult 10/18/2021    Body mass index (BMI) of 40.0 to 44.9 in adult    CKD (chronic kidney disease) 04/20/2023    Disorder of both eustachian tubes 04/20/2023    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    GERD (gastroesophageal reflux disease) 04/20/2023    Lower urinary tract symptoms 04/20/2023    Obesity, unspecified 08/01/2022    Class 2 obesity with body mass index (BMI) of 39.0 to 39.9 in adult    Otalgia, left ear 02/18/2020    Earache, left    Other abnormal glucose 09/29/2016    Abnormal glucose    Other specified abnormal findings of blood chemistry 10/05/2016    Abnormal thyroid blood test    Other specified disorders of ear, unspecified ear 03/18/2015    Fullness in ear    Pelvic and perineal pain 10/24/2019    Pelvic pressure in female    Personal history of other diseases of the respiratory system 12/05/2013    History of acute bronchitis    Personal history of other diseases of the respiratory system 03/18/2015    History of acute sinusitis    Personal history of other diseases of the respiratory system 12/17/2019    History of upper respiratory infection    Personal history of other drug therapy 10/25/2019    History of influenza vaccination    Personal history of other endocrine, nutritional  and metabolic disease     History of dehydration    Personal history of other specified conditions 09/26/2013    History of fatigue    Personal history of other specified conditions 10/29/2013    History of shortness of breath    Personal history of other specified conditions 02/18/2020    History of nasal congestion    Personal history of other specified conditions 02/18/2020    History of postnasal drip    Personal history of other specified conditions 02/09/2015    History of diarrhea    Personal history of urinary (tract) infections 12/18/2018    History of urinary tract infection    Persons encountering health services in other specified circumstances 10/25/2019    Encounter to establish care    Right knee pain 04/20/2023    Simple chronic bronchitis (CMS/HCC) 08/23/2023    SOB (shortness of breath) 08/23/2023    Toe pain 08/23/2023    Unspecified abnormal findings in urine 08/25/2021    Abnormal urinalysis    Urinary tract infection, site not specified 10/18/2021    Acute lower UTI    Urinary tract infection, site not specified 08/30/2022    Acute UTI       Medications and Allergies have been reviewed.    Review Of Systems:  GENERAL: No weight loss, malaise or fevers.  HEENT: Negative for frequent or significant headaches,   RESPIRATORY: Negative for cough, wheezing or shortness of breath.  CARDIOVASCULAR: Negative for chest pain, leg swelling or palpitations.    Physical Exam:  Vascular exam: Dorsalis pedis and Posterior Tibial pulses palpable 2/4 bilateral. Capillary refill time less than 3 seconds b/l. Hair growth noted to digits. +2 b/l LE edema noted. Skin temp warm to warm from proximal to distal b/l. + varicosities noted.     Neurologic exam: Gross sensation present b/l. No neuro deficits noted b/l      Musculoskeletal exam: Muscle strength 5/5 for all major muscle groups tested in the lower extremity b/l. No gross deformities noted b/l. Pain to right plantar and post heel . NO Soi noted. However tender  with palpation     Dermatologic exam:  Webspaces 1-4 b/l are clean, dry and intact. No primary or secondary skin lesions noted. Ulcer noted to right platnar heel. Unstagable pressure ulcer noted. Painful noted to palpation. Drainage noted. No streaking noted. NO tracking , tunneling or underminign noted.   Pt was unaware of open wound, and , showed them picture.       1. Heel ulcer, right, limited to breakdown of skin (CMS/HCC)        2. Pressure ulcer, heel, right, unstageable (CMS/HCC)        3. Right foot pain            Patient exam and eval  Discussed swelling  Would really benefit from elevating    very concerned with level of pain  Discussed walking boot possibly  Discussed not putting pressure on heels - offload as much as possible  Showed patient the level of skin breakdown  Please be offloading as much as possible  Seeing patient back in a week  Dressed with DSD  Dispensed supplies to patient  Concern of pressure wound to area. Monitor where she is resting feet at home, chair vs bed vs wheel chair.   Sooner if any new prob arise.   Pt has appt on Tuesday    Kirstie Martino DPM  [phone]  [facility] [address]  Fax: [phone]

## 2024-03-14 ENCOUNTER — LAB (OUTPATIENT)
Dept: LAB | Facility: LAB | Age: 79
End: 2024-03-14
Payer: MEDICARE

## 2024-03-14 DIAGNOSIS — N39.0 RECURRENT UTI: ICD-10-CM

## 2024-03-14 PROCEDURE — 87086 URINE CULTURE/COLONY COUNT: CPT

## 2024-03-14 PROCEDURE — 87186 SC STD MICRODIL/AGAR DIL: CPT

## 2024-03-16 LAB — BACTERIA UR CULT: ABNORMAL

## 2024-03-18 DIAGNOSIS — N39.0 ACUTE UTI: Primary | ICD-10-CM

## 2024-03-18 RX ORDER — CIPROFLOXACIN 500 MG/1
500 TABLET ORAL 2 TIMES DAILY
Qty: 14 TABLET | Refills: 0 | Status: SHIPPED | OUTPATIENT
Start: 2024-03-18 | End: 2024-03-25

## 2024-03-19 ENCOUNTER — OFFICE VISIT (OUTPATIENT)
Dept: PODIATRY | Facility: CLINIC | Age: 79
End: 2024-03-19
Payer: MEDICARE

## 2024-03-19 DIAGNOSIS — M79.672 PAIN IN BOTH FEET: ICD-10-CM

## 2024-03-19 DIAGNOSIS — M79.671 PAIN IN BOTH FEET: ICD-10-CM

## 2024-03-19 DIAGNOSIS — M79.671 HEEL PAIN, BILATERAL: ICD-10-CM

## 2024-03-19 DIAGNOSIS — M79.671 RIGHT FOOT PAIN: ICD-10-CM

## 2024-03-19 DIAGNOSIS — L97.411: Primary | ICD-10-CM

## 2024-03-19 DIAGNOSIS — M79.672 HEEL PAIN, BILATERAL: ICD-10-CM

## 2024-03-19 PROCEDURE — 99214 OFFICE O/P EST MOD 30 MIN: CPT | Performed by: PODIATRIST

## 2024-03-19 PROCEDURE — 1160F RVW MEDS BY RX/DR IN RCRD: CPT | Performed by: PODIATRIST

## 2024-03-19 PROCEDURE — 1159F MED LIST DOCD IN RCRD: CPT | Performed by: PODIATRIST

## 2024-03-19 PROCEDURE — 1036F TOBACCO NON-USER: CPT | Performed by: PODIATRIST

## 2024-03-19 RX ORDER — TRAMADOL HYDROCHLORIDE 50 MG/1
50 TABLET ORAL EVERY 8 HOURS PRN
Qty: 15 TABLET | Refills: 0 | Status: SHIPPED | OUTPATIENT
Start: 2024-03-19 | End: 2024-03-26

## 2024-03-19 NOTE — PROGRESS NOTES
History of Present Illness:   Patient has continued heel pain  Has been to [facility] wound care center for wound on right foot - States she had incident witharea getting knicked at wound care.   States area is .   States she is unable to bear weight on right foot  Has venous insuff  States pain keeps her up at night.   Very painful today, unable to move foot.    concerned of pain level.     States area is more red.     Past Medical History  Past Medical History:   Diagnosis Date    Acute UTI 04/20/2023    Adverse reaction to NSAIDs, sequela 04/20/2023    Benign essential hypertension 04/20/2023    Bilateral leg and foot pain 04/20/2023    Body mass index (BMI)40.0-44.9, adult 10/18/2021    Body mass index (BMI) of 40.0 to 44.9 in adult    CKD (chronic kidney disease) 04/20/2023    Disorder of both eustachian tubes 04/20/2023    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    GERD (gastroesophageal reflux disease) 04/20/2023    Lower urinary tract symptoms 04/20/2023    Obesity, unspecified 08/01/2022    Class 2 obesity with body mass index (BMI) of 39.0 to 39.9 in adult    Otalgia, left ear 02/18/2020    Earache, left    Other abnormal glucose 09/29/2016    Abnormal glucose    Other specified abnormal findings of blood chemistry 10/05/2016    Abnormal thyroid blood test    Other specified disorders of ear, unspecified ear 03/18/2015    Fullness in ear    Pelvic and perineal pain 10/24/2019    Pelvic pressure in female    Personal history of other diseases of the respiratory system 12/05/2013    History of acute bronchitis    Personal history of other diseases of the respiratory system 03/18/2015    History of acute sinusitis    Personal history of other diseases of the respiratory system 12/17/2019    History of upper respiratory infection    Personal history of other drug therapy 10/25/2019    History of influenza vaccination    Personal history of other endocrine, nutritional and metabolic disease      History of dehydration    Personal history of other specified conditions 09/26/2013    History of fatigue    Personal history of other specified conditions 10/29/2013    History of shortness of breath    Personal history of other specified conditions 02/18/2020    History of nasal congestion    Personal history of other specified conditions 02/18/2020    History of postnasal drip    Personal history of other specified conditions 02/09/2015    History of diarrhea    Personal history of urinary (tract) infections 12/18/2018    History of urinary tract infection    Persons encountering health services in other specified circumstances 10/25/2019    Encounter to establish care    Right knee pain 04/20/2023    Simple chronic bronchitis (CMS/HCC) 08/23/2023    SOB (shortness of breath) 08/23/2023    Toe pain 08/23/2023    Unspecified abnormal findings in urine 08/25/2021    Abnormal urinalysis    Urinary tract infection, site not specified 10/18/2021    Acute lower UTI    Urinary tract infection, site not specified 08/30/2022    Acute UTI       Medications and Allergies have been reviewed.    Review Of Systems:  GENERAL: No weight loss, malaise or fevers.  HEENT: Negative for frequent or significant headaches,   RESPIRATORY: Negative for cough, wheezing or shortness of breath.  CARDIOVASCULAR: Negative for chest pain, leg swelling or palpitations.    Physical Exam:  Vascular exam: Dorsalis pedis and Posterior Tibial pulses palpable 2/4 bilateral. Capillary refill time less than 3 seconds b/l. Hair growth noted to digits. +2 b/l LE edema noted. Skin temp warm to warm from proximal to distal b/l. + varicosities noted.     Neurologic exam: Gross sensation present b/l. No neuro deficits noted b/l      Musculoskeletal exam: Muscle strength 5/5 for all major muscle groups tested in the lower extremity b/l. No gross deformities noted b/l. Pain to right plantar and post heel . NO Soi noted. However tender with palpation      Dermatologic exam:  Webspaces 1-4 b/l are clean, dry and intact.  Ulcer noted to right platnar heel. Granular tissue base. No tracking, tunneling or undermining. Drainage noted. Mlaodor noted.  Painful noted to palpation. No streaking noted.  1. Heel ulcer, right, limited to breakdown of skin (CMS/HCC)  traMADol (Ultram) 50 mg tablet    MR foot right wo IV contrast      2. Right foot pain  traMADol (Ultram) 50 mg tablet    MR foot right wo IV contrast      3. Pain in both feet  traMADol (Ultram) 50 mg tablet    MR foot right wo IV contrast      4. Heel pain, bilateral  traMADol (Ultram) 50 mg tablet    MR foot right wo IV contrast          Patient exam and eval  Pt is on doxy andn cipro  Would really benefit from elevating    very concerned with level of pain  Ordered MRI  Refilled tramadol  Discussed not putting pressure on heels - offload as much as possible  Showed patient the level of skin breakdown  Please be offloading as much as possible  I am out of office until April 3  [facility] was in agreement to plan  Dispensed supplies to patient  Concern of pressure wound to area. Monitor where she is resting feet at home, chair vs bed vs wheel chair.   Sooner if any new prob arise.   Discussed further SOI and when to go to SHERRON Martino DPM  [phone]  [facility]  Fax: [phone]

## 2024-03-21 ENCOUNTER — TELEMEDICINE (OUTPATIENT)
Dept: UROLOGY | Facility: CLINIC | Age: 79
End: 2024-03-21
Payer: MEDICARE

## 2024-03-21 DIAGNOSIS — N32.81 OAB (OVERACTIVE BLADDER): Primary | ICD-10-CM

## 2024-03-21 PROCEDURE — 1036F TOBACCO NON-USER: CPT | Performed by: STUDENT IN AN ORGANIZED HEALTH CARE EDUCATION/TRAINING PROGRAM

## 2024-03-21 PROCEDURE — 1159F MED LIST DOCD IN RCRD: CPT | Performed by: STUDENT IN AN ORGANIZED HEALTH CARE EDUCATION/TRAINING PROGRAM

## 2024-03-21 PROCEDURE — 1160F RVW MEDS BY RX/DR IN RCRD: CPT | Performed by: STUDENT IN AN ORGANIZED HEALTH CARE EDUCATION/TRAINING PROGRAM

## 2024-03-21 PROCEDURE — 99442 PR PHYS/QHP TELEPHONE EVALUATION 11-20 MIN: CPT | Performed by: STUDENT IN AN ORGANIZED HEALTH CARE EDUCATION/TRAINING PROGRAM

## 2024-03-21 RX ORDER — SOLIFENACIN SUCCINATE 5 MG/1
5 TABLET, FILM COATED ORAL DAILY
Qty: 30 TABLET | Refills: 11 | Status: SHIPPED | OUTPATIENT
Start: 2024-03-21 | End: 2025-03-21

## 2024-03-26 ENCOUNTER — APPOINTMENT (OUTPATIENT)
Dept: WOUND CARE | Facility: HOSPITAL | Age: 79
End: 2024-03-26
Payer: MEDICARE

## 2024-03-27 ENCOUNTER — APPOINTMENT (OUTPATIENT)
Dept: PRIMARY CARE | Facility: CLINIC | Age: 79
End: 2024-03-27
Payer: MEDICARE

## 2024-03-27 ENCOUNTER — OFFICE VISIT (OUTPATIENT)
Dept: PRIMARY CARE | Facility: CLINIC | Age: 79
End: 2024-03-27
Payer: MEDICARE

## 2024-03-27 VITALS
HEART RATE: 64 BPM | WEIGHT: 229 LBS | OXYGEN SATURATION: 95 % | BODY MASS INDEX: 40.57 KG/M2 | SYSTOLIC BLOOD PRESSURE: 131 MMHG | DIASTOLIC BLOOD PRESSURE: 69 MMHG | HEIGHT: 63 IN

## 2024-03-27 DIAGNOSIS — L97.512 ULCER OF RIGHT FOOT WITH FAT LAYER EXPOSED (MULTI): ICD-10-CM

## 2024-03-27 DIAGNOSIS — B02.9 HERPES ZOSTER WITHOUT COMPLICATION: Primary | ICD-10-CM

## 2024-03-27 PROCEDURE — 3078F DIAST BP <80 MM HG: CPT | Performed by: NURSE PRACTITIONER

## 2024-03-27 PROCEDURE — 1160F RVW MEDS BY RX/DR IN RCRD: CPT | Performed by: NURSE PRACTITIONER

## 2024-03-27 PROCEDURE — 3075F SYST BP GE 130 - 139MM HG: CPT | Performed by: NURSE PRACTITIONER

## 2024-03-27 PROCEDURE — 99214 OFFICE O/P EST MOD 30 MIN: CPT | Performed by: NURSE PRACTITIONER

## 2024-03-27 PROCEDURE — 1158F ADVNC CARE PLAN TLK DOCD: CPT | Performed by: NURSE PRACTITIONER

## 2024-03-27 PROCEDURE — 1159F MED LIST DOCD IN RCRD: CPT | Performed by: NURSE PRACTITIONER

## 2024-03-27 PROCEDURE — 1123F ACP DISCUSS/DSCN MKR DOCD: CPT | Performed by: NURSE PRACTITIONER

## 2024-03-27 RX ORDER — ACYCLOVIR 400 MG/1
400 TABLET ORAL 3 TIMES DAILY
Qty: 15 TABLET | Refills: 0 | Status: SHIPPED | OUTPATIENT
Start: 2024-03-27 | End: 2024-04-13 | Stop reason: HOSPADM

## 2024-03-27 RX ORDER — ACYCLOVIR 50 MG/G
1 OINTMENT TOPICAL
Qty: 5 G | Refills: 0 | Status: SHIPPED | OUTPATIENT
Start: 2024-03-27 | End: 2024-04-13 | Stop reason: HOSPADM

## 2024-03-27 ASSESSMENT — ENCOUNTER SYMPTOMS
LOSS OF SENSATION IN FEET: 1
OCCASIONAL FEELINGS OF UNSTEADINESS: 1
DEPRESSION: 1

## 2024-03-27 NOTE — PROGRESS NOTES
"Subjective   Patient ID: Magi Boyd is a 79 y.o. female who presents for Rash (Patient is here today for possible shingles on the left side of lower back. Patient is also here for right foot possible infection. Patient currently on antibiotics for her foot.  Patient did have a wound clinic appt but they would not see her due to possible shingles. ).    79-year-old female here for acute same-day visit accompanied by .  She thinks she has shingles on her left posterior back.  She went to the wound care center for her scheduled appointment this morning they said they would not see her because she had shingles.  She is here for an evaluation she denies pain to her back however the skin is itchy very moist and some burning.  She has not put anything on it she just noticed it within the last day.    While she was here her and her  wanted her right foot/heel looked which is why she was going to the wound center.  They have been providing wound care to this right heel for ongoing slow/poor healing.  During this visit I removed the dressing from the foot which her  has been doing at home daily.  He denies change in odor or appearance.  He cleans it with water dries it then put a dry sterile dressing on according to directions given by the wound care center.  She scheduled for an MRI later this week to assess for osteomyelitis of this wound area         Review of Systems    Objective   /69   Pulse 64   Ht 1.6 m (5' 3\")   Wt 104 kg (229 lb)   SpO2 95%   BMI 40.57 kg/m²     Physical Exam  Constitutional:       Appearance: Normal appearance. She is obese.   HENT:      Head: Normocephalic and atraumatic.   Cardiovascular:      Rate and Rhythm: Normal rate.   Pulmonary:      Effort: Pulmonary effort is normal.      Breath sounds: Normal breath sounds.   Musculoskeletal:         General: Swelling present.      Comments: Right heel with very thick deep ulceration mild odor.  Tan eschar noted " and moist.  No surrounding redness and skin appears to be intact.  Very dry skin to the rest of her foot.  She is wearing a very old dirty shoe that she is unable to tie due to edema.  I removed  Old dressing.  Clean with saline pat dry.  Apply triple antibiotic ointment and new dry sterile dressing.   Skin:     Comments: Very large area of dark purple rash intact blistering to her back along the thoracic spine and left flank.  Nontender.  Definite increase in moisture to this area.  Nontender to touch however she does have heightened sensation to the area   Neurological:      General: No focal deficit present.      Mental Status: She is alert. Mental status is at baseline.         Assessment/Plan   Diagnoses and all orders for this visit:  Herpes zoster without complication  Comments:  Topical and oral acyclovir ordered.  Renal function taking into account.  Educated on shingles and contagiousness  Orders:  -     acyclovir (Zovirax) 5 % ointment; Apply 1 Application topically 5 times a day for 4 days. Space applications every 3 hours.  -     acyclovir (Zovirax) 400 mg tablet; Take 1 tablet (400 mg) by mouth 3 times a day for 5 days.  Ulcer of right foot with fat layer exposed (CMS/HCC)  Comments:  This needs close follow-up.  If she is unable to see wound care secondary to shingles she needs to see PCP in 1 week  Other orders  -     Follow Up In Primary Care - Established; Future       Due to the active shingles and current blistering I think she will need to cancel the MRI.  She should call radiology and ask their protocol.     She needs to see wound care as soon as allowed once shingles is dried up    I want to try topical to see if it helps heal it faster    At this time she is not having any pain to the area of shingles or the right foot.  Both areas need to be monitored closely.  Follow-up in office with this provider or PCP in 1 week and as needed

## 2024-03-29 ENCOUNTER — APPOINTMENT (OUTPATIENT)
Dept: RADIOLOGY | Facility: HOSPITAL | Age: 79
End: 2024-03-29
Payer: MEDICARE

## 2024-04-01 ENCOUNTER — TELEPHONE (OUTPATIENT)
Dept: PRIMARY CARE | Facility: CLINIC | Age: 79
End: 2024-04-01
Payer: MEDICARE

## 2024-04-03 ENCOUNTER — APPOINTMENT (OUTPATIENT)
Dept: RADIOLOGY | Facility: HOSPITAL | Age: 79
DRG: 571 | End: 2024-04-03
Payer: MEDICARE

## 2024-04-03 ENCOUNTER — APPOINTMENT (OUTPATIENT)
Dept: PODIATRY | Facility: CLINIC | Age: 79
End: 2024-04-03
Payer: MEDICARE

## 2024-04-03 ENCOUNTER — APPOINTMENT (OUTPATIENT)
Dept: PRIMARY CARE | Facility: CLINIC | Age: 79
End: 2024-04-03
Payer: MEDICARE

## 2024-04-03 ENCOUNTER — HOSPITAL ENCOUNTER (INPATIENT)
Facility: HOSPITAL | Age: 79
LOS: 10 days | Discharge: NURSING, REHAB OR LONG-TERM CARE | DRG: 571 | End: 2024-04-13
Attending: STUDENT IN AN ORGANIZED HEALTH CARE EDUCATION/TRAINING PROGRAM | Admitting: INTERNAL MEDICINE
Payer: MEDICARE

## 2024-04-03 ENCOUNTER — OFFICE VISIT (OUTPATIENT)
Dept: PODIATRY | Facility: CLINIC | Age: 79
End: 2024-04-03
Payer: MEDICARE

## 2024-04-03 DIAGNOSIS — N18.4 BENIGN HYPERTENSION WITH CKD (CHRONIC KIDNEY DISEASE) STAGE IV (MULTI): ICD-10-CM

## 2024-04-03 DIAGNOSIS — R55 SYNCOPE, UNSPECIFIED SYNCOPE TYPE: ICD-10-CM

## 2024-04-03 DIAGNOSIS — M79.672 HEEL PAIN, BILATERAL: Primary | ICD-10-CM

## 2024-04-03 DIAGNOSIS — L03.818 CELLULITIS OF OTHER SPECIFIED SITE: ICD-10-CM

## 2024-04-03 DIAGNOSIS — I73.9 PVD (PERIPHERAL VASCULAR DISEASE) (CMS-HCC): ICD-10-CM

## 2024-04-03 DIAGNOSIS — I12.9 BENIGN HYPERTENSION WITH CKD (CHRONIC KIDNEY DISEASE) STAGE IV (MULTI): ICD-10-CM

## 2024-04-03 DIAGNOSIS — L97.411: ICD-10-CM

## 2024-04-03 DIAGNOSIS — R60.0 LOCALIZED EDEMA: ICD-10-CM

## 2024-04-03 DIAGNOSIS — L97.519 ULCER OF RIGHT FOOT, UNSPECIFIED ULCER STAGE (MULTI): ICD-10-CM

## 2024-04-03 DIAGNOSIS — R60.9 EDEMA, UNSPECIFIED TYPE: ICD-10-CM

## 2024-04-03 DIAGNOSIS — R60.0 LOWER EXTREMITY EDEMA: ICD-10-CM

## 2024-04-03 DIAGNOSIS — Z78.9 FAILURE OF OUTPATIENT TREATMENT: Primary | ICD-10-CM

## 2024-04-03 DIAGNOSIS — N18.4 CHRONIC KIDNEY DISEASE, STAGE 4 (SEVERE) (MULTI): ICD-10-CM

## 2024-04-03 DIAGNOSIS — M79.671 HEEL PAIN, BILATERAL: Primary | ICD-10-CM

## 2024-04-03 DIAGNOSIS — L89.610: ICD-10-CM

## 2024-04-03 DIAGNOSIS — I10 PRIMARY HYPERTENSION: ICD-10-CM

## 2024-04-03 LAB
ALBUMIN SERPL BCP-MCNC: 3.3 G/DL (ref 3.4–5)
ALP SERPL-CCNC: 64 U/L (ref 33–136)
ALT SERPL W P-5'-P-CCNC: 11 U/L (ref 7–45)
ANION GAP SERPL CALC-SCNC: 11 MMOL/L (ref 10–20)
AST SERPL W P-5'-P-CCNC: 16 U/L (ref 9–39)
BASOPHILS # BLD AUTO: 0.05 X10*3/UL (ref 0–0.1)
BASOPHILS NFR BLD AUTO: 0.7 %
BILIRUB SERPL-MCNC: 0.5 MG/DL (ref 0–1.2)
BUN SERPL-MCNC: 37 MG/DL (ref 6–23)
CALCIUM SERPL-MCNC: 9.4 MG/DL (ref 8.6–10.3)
CHLORIDE SERPL-SCNC: 103 MMOL/L (ref 98–107)
CO2 SERPL-SCNC: 26 MMOL/L (ref 21–32)
CREAT SERPL-MCNC: 2.28 MG/DL (ref 0.5–1.05)
CRP SERPL-MCNC: 2.55 MG/DL
EGFRCR SERPLBLD CKD-EPI 2021: 22 ML/MIN/1.73M*2
EOSINOPHIL # BLD AUTO: 0.18 X10*3/UL (ref 0–0.4)
EOSINOPHIL NFR BLD AUTO: 2.5 %
ERYTHROCYTE [DISTWIDTH] IN BLOOD BY AUTOMATED COUNT: 12.8 % (ref 11.5–14.5)
ERYTHROCYTE [SEDIMENTATION RATE] IN BLOOD BY WESTERGREN METHOD: 65 MM/H (ref 0–30)
GLUCOSE SERPL-MCNC: 117 MG/DL (ref 74–99)
HCT VFR BLD AUTO: 32.6 % (ref 36–46)
HGB BLD-MCNC: 10.4 G/DL (ref 12–16)
IMM GRANULOCYTES # BLD AUTO: 0.02 X10*3/UL (ref 0–0.5)
IMM GRANULOCYTES NFR BLD AUTO: 0.3 % (ref 0–0.9)
LYMPHOCYTES # BLD AUTO: 1.24 X10*3/UL (ref 0.8–3)
LYMPHOCYTES NFR BLD AUTO: 17.6 %
MCH RBC QN AUTO: 30.4 PG (ref 26–34)
MCHC RBC AUTO-ENTMCNC: 31.9 G/DL (ref 32–36)
MCV RBC AUTO: 95 FL (ref 80–100)
MONOCYTES # BLD AUTO: 0.66 X10*3/UL (ref 0.05–0.8)
MONOCYTES NFR BLD AUTO: 9.3 %
NEUTROPHILS # BLD AUTO: 4.91 X10*3/UL (ref 1.6–5.5)
NEUTROPHILS NFR BLD AUTO: 69.6 %
NRBC BLD-RTO: 0 /100 WBCS (ref 0–0)
PLATELET # BLD AUTO: 217 X10*3/UL (ref 150–450)
POTASSIUM SERPL-SCNC: 4 MMOL/L (ref 3.5–5.3)
PROT SERPL-MCNC: 7.3 G/DL (ref 6.4–8.2)
RBC # BLD AUTO: 3.42 X10*6/UL (ref 4–5.2)
SODIUM SERPL-SCNC: 136 MMOL/L (ref 136–145)
WBC # BLD AUTO: 7.1 X10*3/UL (ref 4.4–11.3)

## 2024-04-03 PROCEDURE — 1100000001 HC PRIVATE ROOM DAILY

## 2024-04-03 PROCEDURE — 86140 C-REACTIVE PROTEIN: CPT | Performed by: STUDENT IN AN ORGANIZED HEALTH CARE EDUCATION/TRAINING PROGRAM

## 2024-04-03 PROCEDURE — 2500000002 HC RX 250 W HCPCS SELF ADMINISTERED DRUGS (ALT 637 FOR MEDICARE OP, ALT 636 FOR OP/ED): Mod: MUE

## 2024-04-03 PROCEDURE — 1123F ACP DISCUSS/DSCN MKR DOCD: CPT | Performed by: PODIATRIST

## 2024-04-03 PROCEDURE — 73630 X-RAY EXAM OF FOOT: CPT | Mod: RT

## 2024-04-03 PROCEDURE — 2500000004 HC RX 250 GENERAL PHARMACY W/ HCPCS (ALT 636 FOR OP/ED)

## 2024-04-03 PROCEDURE — 99285 EMERGENCY DEPT VISIT HI MDM: CPT | Mod: 25

## 2024-04-03 PROCEDURE — 1159F MED LIST DOCD IN RCRD: CPT | Performed by: PODIATRIST

## 2024-04-03 PROCEDURE — 36415 COLL VENOUS BLD VENIPUNCTURE: CPT | Performed by: STUDENT IN AN ORGANIZED HEALTH CARE EDUCATION/TRAINING PROGRAM

## 2024-04-03 PROCEDURE — 80053 COMPREHEN METABOLIC PANEL: CPT | Performed by: STUDENT IN AN ORGANIZED HEALTH CARE EDUCATION/TRAINING PROGRAM

## 2024-04-03 PROCEDURE — 85652 RBC SED RATE AUTOMATED: CPT | Performed by: STUDENT IN AN ORGANIZED HEALTH CARE EDUCATION/TRAINING PROGRAM

## 2024-04-03 PROCEDURE — 1160F RVW MEDS BY RX/DR IN RCRD: CPT | Performed by: PODIATRIST

## 2024-04-03 PROCEDURE — 96365 THER/PROPH/DIAG IV INF INIT: CPT

## 2024-04-03 PROCEDURE — 85025 COMPLETE CBC W/AUTO DIFF WBC: CPT | Performed by: STUDENT IN AN ORGANIZED HEALTH CARE EDUCATION/TRAINING PROGRAM

## 2024-04-03 PROCEDURE — 1036F TOBACCO NON-USER: CPT | Performed by: PODIATRIST

## 2024-04-03 PROCEDURE — 2500000004 HC RX 250 GENERAL PHARMACY W/ HCPCS (ALT 636 FOR OP/ED): Mod: JZ | Performed by: STUDENT IN AN ORGANIZED HEALTH CARE EDUCATION/TRAINING PROGRAM

## 2024-04-03 PROCEDURE — 73630 X-RAY EXAM OF FOOT: CPT | Mod: RIGHT SIDE | Performed by: RADIOLOGY

## 2024-04-03 PROCEDURE — 2500000001 HC RX 250 WO HCPCS SELF ADMINISTERED DRUGS (ALT 637 FOR MEDICARE OP)

## 2024-04-03 PROCEDURE — 99214 OFFICE O/P EST MOD 30 MIN: CPT | Performed by: PODIATRIST

## 2024-04-03 RX ORDER — FAMOTIDINE 20 MG/1
10 TABLET, FILM COATED ORAL EVERY OTHER DAY
Status: DISCONTINUED | OUTPATIENT
Start: 2024-04-04 | End: 2024-04-13 | Stop reason: HOSPADM

## 2024-04-03 RX ORDER — FUROSEMIDE 40 MG/1
40 TABLET ORAL DAILY
Status: DISCONTINUED | OUTPATIENT
Start: 2024-04-03 | End: 2024-04-13 | Stop reason: HOSPADM

## 2024-04-03 RX ORDER — METRONIDAZOLE 500 MG/100ML
500 INJECTION, SOLUTION INTRAVENOUS ONCE
Status: COMPLETED | OUTPATIENT
Start: 2024-04-03 | End: 2024-04-04

## 2024-04-03 RX ORDER — ACYCLOVIR 200 MG/1
400 CAPSULE ORAL ONCE
Status: COMPLETED | OUTPATIENT
Start: 2024-04-03 | End: 2024-04-03

## 2024-04-03 RX ORDER — POLYETHYLENE GLYCOL 3350 17 G/17G
17 POWDER, FOR SOLUTION ORAL DAILY PRN
Status: DISCONTINUED | OUTPATIENT
Start: 2024-04-03 | End: 2024-04-13 | Stop reason: HOSPADM

## 2024-04-03 RX ORDER — ATENOLOL 50 MG/1
100 TABLET ORAL DAILY
Status: DISCONTINUED | OUTPATIENT
Start: 2024-04-03 | End: 2024-04-13 | Stop reason: HOSPADM

## 2024-04-03 RX ORDER — FLUTICASONE FUROATE AND VILANTEROL 100; 25 UG/1; UG/1
1 POWDER RESPIRATORY (INHALATION)
Status: DISCONTINUED | OUTPATIENT
Start: 2024-04-04 | End: 2024-04-13 | Stop reason: HOSPADM

## 2024-04-03 RX ORDER — CEFEPIME 1 G/50ML
2 INJECTION, SOLUTION INTRAVENOUS ONCE
Status: COMPLETED | OUTPATIENT
Start: 2024-04-03 | End: 2024-04-03

## 2024-04-03 RX ORDER — TRAMADOL HYDROCHLORIDE 50 MG/1
50 TABLET ORAL EVERY 8 HOURS PRN
COMMUNITY
End: 2024-04-13 | Stop reason: SDUPTHER

## 2024-04-03 RX ORDER — SOLIFENACIN SUCCINATE 5 MG/1
5 TABLET, FILM COATED ORAL DAILY
Status: DISCONTINUED | OUTPATIENT
Start: 2024-04-03 | End: 2024-04-03

## 2024-04-03 RX ORDER — VANCOMYCIN HYDROCHLORIDE 1 G/20ML
INJECTION, POWDER, LYOPHILIZED, FOR SOLUTION INTRAVENOUS DAILY PRN
Status: DISCONTINUED | OUTPATIENT
Start: 2024-04-03 | End: 2024-04-05

## 2024-04-03 RX ORDER — HEPARIN SODIUM 5000 [USP'U]/ML
7500 INJECTION, SOLUTION INTRAVENOUS; SUBCUTANEOUS EVERY 8 HOURS SCHEDULED
Status: DISCONTINUED | OUTPATIENT
Start: 2024-04-03 | End: 2024-04-13 | Stop reason: HOSPADM

## 2024-04-03 RX ORDER — LEVOTHYROXINE SODIUM 75 UG/1
75 TABLET ORAL DAILY
Status: DISCONTINUED | OUTPATIENT
Start: 2024-04-04 | End: 2024-04-13 | Stop reason: HOSPADM

## 2024-04-03 RX ORDER — FERROUS GLUCONATE 325 MG
324 TABLET ORAL 2 TIMES DAILY
Status: DISCONTINUED | OUTPATIENT
Start: 2024-04-03 | End: 2024-04-13 | Stop reason: HOSPADM

## 2024-04-03 RX ORDER — ATORVASTATIN CALCIUM 20 MG/1
20 TABLET, FILM COATED ORAL DAILY
Status: DISCONTINUED | OUTPATIENT
Start: 2024-04-03 | End: 2024-04-13 | Stop reason: HOSPADM

## 2024-04-03 RX ORDER — OXYBUTYNIN CHLORIDE 5 MG/1
5 TABLET ORAL NIGHTLY
Status: DISCONTINUED | OUTPATIENT
Start: 2024-04-03 | End: 2024-04-13 | Stop reason: HOSPADM

## 2024-04-03 RX ORDER — FEBUXOSTAT 40 MG/1
40 TABLET, FILM COATED ORAL DAILY
Status: DISCONTINUED | OUTPATIENT
Start: 2024-04-03 | End: 2024-04-13 | Stop reason: HOSPADM

## 2024-04-03 RX ORDER — ALUMINUM HYDROXIDE, MAGNESIUM HYDROXIDE, AND SIMETHICONE 1200; 120; 1200 MG/30ML; MG/30ML; MG/30ML
30 SUSPENSION ORAL DAILY PRN
Status: DISCONTINUED | OUTPATIENT
Start: 2024-04-03 | End: 2024-04-13 | Stop reason: HOSPADM

## 2024-04-03 RX ADMIN — ATORVASTATIN CALCIUM 20 MG: 20 TABLET, FILM COATED ORAL at 22:17

## 2024-04-03 RX ADMIN — ACYCLOVIR 400 MG: 200 CAPSULE ORAL at 22:17

## 2024-04-03 RX ADMIN — ATENOLOL 100 MG: 50 TABLET ORAL at 22:17

## 2024-04-03 RX ADMIN — FERROUS GLUCONATE 324 MG: 324 TABLET ORAL at 22:17

## 2024-04-03 RX ADMIN — HEPARIN SODIUM 7500 UNITS: 5000 INJECTION INTRAVENOUS; SUBCUTANEOUS at 22:18

## 2024-04-03 RX ADMIN — FUROSEMIDE 40 MG: 40 TABLET ORAL at 22:18

## 2024-04-03 RX ADMIN — CEFEPIME 2 G: 1 INJECTION, SOLUTION INTRAVENOUS at 19:58

## 2024-04-03 RX ADMIN — OXYBUTYNIN CHLORIDE 5 MG: 5 TABLET ORAL at 22:18

## 2024-04-03 ASSESSMENT — PAIN - FUNCTIONAL ASSESSMENT
PAIN_FUNCTIONAL_ASSESSMENT: 0-10
PAIN_FUNCTIONAL_ASSESSMENT: 0-10

## 2024-04-03 ASSESSMENT — COGNITIVE AND FUNCTIONAL STATUS - GENERAL
MOBILITY SCORE: 10
CLIMB 3 TO 5 STEPS WITH RAILING: TOTAL
WALKING IN HOSPITAL ROOM: TOTAL
MOVING FROM LYING ON BACK TO SITTING ON SIDE OF FLAT BED WITH BEDRAILS: A LOT
TURNING FROM BACK TO SIDE WHILE IN FLAT BAD: A LOT
MOVING TO AND FROM BED TO CHAIR: A LOT
STANDING UP FROM CHAIR USING ARMS: A LOT

## 2024-04-03 ASSESSMENT — LIFESTYLE VARIABLES
HAVE YOU EVER FELT YOU SHOULD CUT DOWN ON YOUR DRINKING: NO
TOTAL SCORE: 0
HAVE PEOPLE ANNOYED YOU BY CRITICIZING YOUR DRINKING: NO
EVER HAD A DRINK FIRST THING IN THE MORNING TO STEADY YOUR NERVES TO GET RID OF A HANGOVER: NO
EVER FELT BAD OR GUILTY ABOUT YOUR DRINKING: NO

## 2024-04-03 ASSESSMENT — PAIN DESCRIPTION - PROGRESSION: CLINICAL_PROGRESSION: NOT CHANGED

## 2024-04-03 ASSESSMENT — COLUMBIA-SUICIDE SEVERITY RATING SCALE - C-SSRS
1. IN THE PAST MONTH, HAVE YOU WISHED YOU WERE DEAD OR WISHED YOU COULD GO TO SLEEP AND NOT WAKE UP?: NO
6. HAVE YOU EVER DONE ANYTHING, STARTED TO DO ANYTHING, OR PREPARED TO DO ANYTHING TO END YOUR LIFE?: NO
2. HAVE YOU ACTUALLY HAD ANY THOUGHTS OF KILLING YOURSELF?: NO

## 2024-04-03 ASSESSMENT — PAIN SCALES - GENERAL
PAINLEVEL_OUTOF10: 0 - NO PAIN

## 2024-04-04 ENCOUNTER — APPOINTMENT (OUTPATIENT)
Dept: RADIOLOGY | Facility: HOSPITAL | Age: 79
DRG: 571 | End: 2024-04-04
Payer: MEDICARE

## 2024-04-04 ENCOUNTER — APPOINTMENT (OUTPATIENT)
Dept: VASCULAR MEDICINE | Facility: HOSPITAL | Age: 79
DRG: 571 | End: 2024-04-04
Payer: MEDICARE

## 2024-04-04 LAB
ALBUMIN SERPL BCP-MCNC: 2.7 G/DL (ref 3.4–5)
ALP SERPL-CCNC: 53 U/L (ref 33–136)
ALT SERPL W P-5'-P-CCNC: 9 U/L (ref 7–45)
ANION GAP SERPL CALC-SCNC: 10 MMOL/L (ref 10–20)
ANION GAP SERPL CALC-SCNC: 15 MMOL/L (ref 10–20)
AST SERPL W P-5'-P-CCNC: 13 U/L (ref 9–39)
BILIRUB SERPL-MCNC: 0.4 MG/DL (ref 0–1.2)
BUN SERPL-MCNC: 32 MG/DL (ref 6–23)
BUN SERPL-MCNC: 32 MG/DL (ref 6–23)
CALCIUM SERPL-MCNC: 8.9 MG/DL (ref 8.6–10.3)
CALCIUM SERPL-MCNC: 9 MG/DL (ref 8.6–10.3)
CHLORIDE SERPL-SCNC: 102 MMOL/L (ref 98–107)
CHLORIDE SERPL-SCNC: 102 MMOL/L (ref 98–107)
CO2 SERPL-SCNC: 23 MMOL/L (ref 21–32)
CO2 SERPL-SCNC: 25 MMOL/L (ref 21–32)
CREAT SERPL-MCNC: 2.05 MG/DL (ref 0.5–1.05)
CREAT SERPL-MCNC: 2.05 MG/DL (ref 0.5–1.05)
EGFRCR SERPLBLD CKD-EPI 2021: 25 ML/MIN/1.73M*2
EGFRCR SERPLBLD CKD-EPI 2021: 25 ML/MIN/1.73M*2
ERYTHROCYTE [DISTWIDTH] IN BLOOD BY AUTOMATED COUNT: 12.7 % (ref 11.5–14.5)
GLUCOSE SERPL-MCNC: 98 MG/DL (ref 74–99)
GLUCOSE SERPL-MCNC: 98 MG/DL (ref 74–99)
HCT VFR BLD AUTO: 32.2 % (ref 36–46)
HGB BLD-MCNC: 10 G/DL (ref 12–16)
MCH RBC QN AUTO: 30.4 PG (ref 26–34)
MCHC RBC AUTO-ENTMCNC: 31.1 G/DL (ref 32–36)
MCV RBC AUTO: 98 FL (ref 80–100)
NRBC BLD-RTO: 0 /100 WBCS (ref 0–0)
PLATELET # BLD AUTO: 184 X10*3/UL (ref 150–450)
POTASSIUM SERPL-SCNC: 3.8 MMOL/L (ref 3.5–5.3)
POTASSIUM SERPL-SCNC: 4.1 MMOL/L (ref 3.5–5.3)
PROT SERPL-MCNC: 6.1 G/DL (ref 6.4–8.2)
RBC # BLD AUTO: 3.29 X10*6/UL (ref 4–5.2)
SODIUM SERPL-SCNC: 133 MMOL/L (ref 136–145)
SODIUM SERPL-SCNC: 136 MMOL/L (ref 136–145)
WBC # BLD AUTO: 5 X10*3/UL (ref 4.4–11.3)

## 2024-04-04 PROCEDURE — 2500000004 HC RX 250 GENERAL PHARMACY W/ HCPCS (ALT 636 FOR OP/ED)

## 2024-04-04 PROCEDURE — 73718 MRI LOWER EXTREMITY W/O DYE: CPT | Mod: RIGHT SIDE | Performed by: STUDENT IN AN ORGANIZED HEALTH CARE EDUCATION/TRAINING PROGRAM

## 2024-04-04 PROCEDURE — 2500000002 HC RX 250 W HCPCS SELF ADMINISTERED DRUGS (ALT 637 FOR MEDICARE OP, ALT 636 FOR OP/ED): Mod: MUE

## 2024-04-04 PROCEDURE — 2500000001 HC RX 250 WO HCPCS SELF ADMINISTERED DRUGS (ALT 637 FOR MEDICARE OP)

## 2024-04-04 PROCEDURE — 36415 COLL VENOUS BLD VENIPUNCTURE: CPT

## 2024-04-04 PROCEDURE — 73721 MRI JNT OF LWR EXTRE W/O DYE: CPT | Mod: RIGHT SIDE | Performed by: STUDENT IN AN ORGANIZED HEALTH CARE EDUCATION/TRAINING PROGRAM

## 2024-04-04 PROCEDURE — 2500000004 HC RX 250 GENERAL PHARMACY W/ HCPCS (ALT 636 FOR OP/ED): Mod: JZ | Performed by: STUDENT IN AN ORGANIZED HEALTH CARE EDUCATION/TRAINING PROGRAM

## 2024-04-04 PROCEDURE — 99223 1ST HOSP IP/OBS HIGH 75: CPT

## 2024-04-04 PROCEDURE — 73718 MRI LOWER EXTREMITY W/O DYE: CPT | Mod: RT

## 2024-04-04 PROCEDURE — 1100000001 HC PRIVATE ROOM DAILY

## 2024-04-04 PROCEDURE — 93970 EXTREMITY STUDY: CPT | Performed by: SURGERY

## 2024-04-04 PROCEDURE — 80048 BASIC METABOLIC PNL TOTAL CA: CPT | Mod: CCI | Performed by: INTERNAL MEDICINE

## 2024-04-04 PROCEDURE — A4217 STERILE WATER/SALINE, 500 ML: HCPCS | Performed by: STUDENT IN AN ORGANIZED HEALTH CARE EDUCATION/TRAINING PROGRAM

## 2024-04-04 PROCEDURE — 85027 COMPLETE CBC AUTOMATED: CPT

## 2024-04-04 PROCEDURE — 73721 MRI JNT OF LWR EXTRE W/O DYE: CPT | Mod: RT

## 2024-04-04 PROCEDURE — 80053 COMPREHEN METABOLIC PANEL: CPT

## 2024-04-04 PROCEDURE — 93970 EXTREMITY STUDY: CPT

## 2024-04-04 RX ORDER — TRAMADOL HYDROCHLORIDE 50 MG/1
50 TABLET ORAL EVERY 12 HOURS PRN
Status: DISCONTINUED | OUTPATIENT
Start: 2024-04-04 | End: 2024-04-13 | Stop reason: HOSPADM

## 2024-04-04 RX ADMIN — HEPARIN SODIUM 7500 UNITS: 5000 INJECTION INTRAVENOUS; SUBCUTANEOUS at 14:19

## 2024-04-04 RX ADMIN — LEVOTHYROXINE SODIUM 75 MCG: 75 TABLET ORAL at 06:20

## 2024-04-04 RX ADMIN — OXYBUTYNIN CHLORIDE 5 MG: 5 TABLET ORAL at 20:40

## 2024-04-04 RX ADMIN — FUROSEMIDE 40 MG: 40 TABLET ORAL at 08:49

## 2024-04-04 RX ADMIN — FERROUS GLUCONATE 324 MG: 324 TABLET ORAL at 20:40

## 2024-04-04 RX ADMIN — METRONIDAZOLE 500 MG: 500 INJECTION, SOLUTION INTRAVENOUS at 01:27

## 2024-04-04 RX ADMIN — FERROUS GLUCONATE 324 MG: 324 TABLET ORAL at 08:50

## 2024-04-04 RX ADMIN — FEBUXOSTAT 40 MG: 40 TABLET ORAL at 08:51

## 2024-04-04 RX ADMIN — HEPARIN SODIUM 7500 UNITS: 5000 INJECTION INTRAVENOUS; SUBCUTANEOUS at 06:20

## 2024-04-04 RX ADMIN — CEFEPIME 1 G: 1 INJECTION, POWDER, FOR SOLUTION INTRAMUSCULAR; INTRAVENOUS at 17:49

## 2024-04-04 RX ADMIN — VANCOMYCIN HYDROCHLORIDE 2000 MG: 10 INJECTION, POWDER, LYOPHILIZED, FOR SOLUTION INTRAVENOUS at 03:29

## 2024-04-04 RX ADMIN — FAMOTIDINE 10 MG: 20 TABLET ORAL at 08:49

## 2024-04-04 RX ADMIN — HEPARIN SODIUM 7500 UNITS: 5000 INJECTION INTRAVENOUS; SUBCUTANEOUS at 22:00

## 2024-04-04 SDOH — SOCIAL STABILITY: SOCIAL INSECURITY: ARE THERE ANY APPARENT SIGNS OF INJURIES/BEHAVIORS THAT COULD BE RELATED TO ABUSE/NEGLECT?: NO

## 2024-04-04 SDOH — SOCIAL STABILITY: SOCIAL INSECURITY: HAS ANYONE EVER THREATENED TO HURT YOUR FAMILY OR YOUR PETS?: NO

## 2024-04-04 SDOH — SOCIAL STABILITY: SOCIAL INSECURITY: ARE YOU OR HAVE YOU BEEN THREATENED OR ABUSED PHYSICALLY, EMOTIONALLY, OR SEXUALLY BY ANYONE?: NO

## 2024-04-04 SDOH — SOCIAL STABILITY: SOCIAL INSECURITY: DO YOU FEEL ANYONE HAS EXPLOITED OR TAKEN ADVANTAGE OF YOU FINANCIALLY OR OF YOUR PERSONAL PROPERTY?: NO

## 2024-04-04 SDOH — SOCIAL STABILITY: SOCIAL INSECURITY: WERE YOU ABLE TO COMPLETE ALL THE BEHAVIORAL HEALTH SCREENINGS?: YES

## 2024-04-04 SDOH — SOCIAL STABILITY: SOCIAL INSECURITY: DOES ANYONE TRY TO KEEP YOU FROM HAVING/CONTACTING OTHER FRIENDS OR DOING THINGS OUTSIDE YOUR HOME?: NO

## 2024-04-04 SDOH — SOCIAL STABILITY: SOCIAL INSECURITY: DO YOU FEEL UNSAFE GOING BACK TO THE PLACE WHERE YOU ARE LIVING?: NO

## 2024-04-04 SDOH — SOCIAL STABILITY: SOCIAL INSECURITY: ABUSE: ADULT

## 2024-04-04 SDOH — SOCIAL STABILITY: SOCIAL INSECURITY: HAVE YOU HAD THOUGHTS OF HARMING ANYONE ELSE?: NO

## 2024-04-04 ASSESSMENT — PATIENT HEALTH QUESTIONNAIRE - PHQ9
1. LITTLE INTEREST OR PLEASURE IN DOING THINGS: NOT AT ALL
2. FEELING DOWN, DEPRESSED OR HOPELESS: NOT AT ALL
SUM OF ALL RESPONSES TO PHQ9 QUESTIONS 1 & 2: 0

## 2024-04-04 ASSESSMENT — COGNITIVE AND FUNCTIONAL STATUS - GENERAL
PERSONAL GROOMING: A LOT
EATING MEALS: A LOT
MOVING FROM LYING ON BACK TO SITTING ON SIDE OF FLAT BED WITH BEDRAILS: A LOT
STANDING UP FROM CHAIR USING ARMS: A LOT
PERSONAL GROOMING: A LOT
DRESSING REGULAR UPPER BODY CLOTHING: A LOT
MOVING TO AND FROM BED TO CHAIR: A LOT
CLIMB 3 TO 5 STEPS WITH RAILING: TOTAL
TURNING FROM BACK TO SIDE WHILE IN FLAT BAD: A LOT
DRESSING REGULAR UPPER BODY CLOTHING: A LOT
TURNING FROM BACK TO SIDE WHILE IN FLAT BAD: A LOT
CLIMB 3 TO 5 STEPS WITH RAILING: TOTAL
PATIENT BASELINE BEDBOUND: NO
DRESSING REGULAR UPPER BODY CLOTHING: A LOT
STANDING UP FROM CHAIR USING ARMS: A LOT
HELP NEEDED FOR BATHING: A LOT
STANDING UP FROM CHAIR USING ARMS: A LOT
DAILY ACTIVITIY SCORE: 12
MOBILITY SCORE: 10
CLIMB 3 TO 5 STEPS WITH RAILING: TOTAL
MOBILITY SCORE: 10
MOVING FROM LYING ON BACK TO SITTING ON SIDE OF FLAT BED WITH BEDRAILS: A LOT
DAILY ACTIVITIY SCORE: 12
WALKING IN HOSPITAL ROOM: TOTAL
HELP NEEDED FOR BATHING: A LOT
DRESSING REGULAR LOWER BODY CLOTHING: A LOT
MOVING TO AND FROM BED TO CHAIR: A LOT
EATING MEALS: A LOT
MOBILITY SCORE: 10
TURNING FROM BACK TO SIDE WHILE IN FLAT BAD: A LOT
MOVING TO AND FROM BED TO CHAIR: A LOT
PERSONAL GROOMING: A LOT
TOILETING: A LOT
TOILETING: A LOT
WALKING IN HOSPITAL ROOM: TOTAL
DRESSING REGULAR LOWER BODY CLOTHING: A LOT
DAILY ACTIVITIY SCORE: 12
MOVING FROM LYING ON BACK TO SITTING ON SIDE OF FLAT BED WITH BEDRAILS: A LOT
TOILETING: A LOT
DRESSING REGULAR LOWER BODY CLOTHING: A LOT
WALKING IN HOSPITAL ROOM: TOTAL
EATING MEALS: A LOT
HELP NEEDED FOR BATHING: A LOT

## 2024-04-04 ASSESSMENT — ACTIVITIES OF DAILY LIVING (ADL)
ASSISTIVE_DEVICE: WALKER
DRESSING YOURSELF: NEEDS ASSISTANCE
FEEDING YOURSELF: INDEPENDENT
JUDGMENT_ADEQUATE_SAFELY_COMPLETE_DAILY_ACTIVITIES: YES
HEARING - RIGHT EAR: FUNCTIONAL
BATHING: NEEDS ASSISTANCE
ADEQUATE_TO_COMPLETE_ADL: YES
HEARING - LEFT EAR: FUNCTIONAL
WALKS IN HOME: DEPENDENT
PATIENT'S MEMORY ADEQUATE TO SAFELY COMPLETE DAILY ACTIVITIES?: YES
GROOMING: NEEDS ASSISTANCE
TOILETING: NEEDS ASSISTANCE

## 2024-04-04 ASSESSMENT — PAIN SCALES - GENERAL
PAINLEVEL_OUTOF10: 0 - NO PAIN
PAINLEVEL_OUTOF10: 0 - NO PAIN

## 2024-04-04 ASSESSMENT — LIFESTYLE VARIABLES
HOW OFTEN DO YOU HAVE A DRINK CONTAINING ALCOHOL: NEVER
HOW OFTEN DO YOU HAVE 6 OR MORE DRINKS ON ONE OCCASION: NEVER
SKIP TO QUESTIONS 9-10: 1
SUBSTANCE_ABUSE_PAST_12_MONTHS: NO
AUDIT-C TOTAL SCORE: 0
AUDIT-C TOTAL SCORE: 0
HOW MANY STANDARD DRINKS CONTAINING ALCOHOL DO YOU HAVE ON A TYPICAL DAY: PATIENT DOES NOT DRINK
PRESCIPTION_ABUSE_PAST_12_MONTHS: NO

## 2024-04-04 ASSESSMENT — PAIN - FUNCTIONAL ASSESSMENT
PAIN_FUNCTIONAL_ASSESSMENT: 0-10
PAIN_FUNCTIONAL_ASSESSMENT: 0-10

## 2024-04-04 NOTE — H&P
Internal Medicine:  Initial History and Physical Note     Patient: Magi Boyd, Age: 79 y.o., SEX: female, MRN:[id number], ROOM[facility], Code:Full Code                                                                 Admitted On: 4/3/2024                                                                    Admitting Dx: No admission diagnoses are documented for this encounter.                                                                            PCP: Ofe Herrera, APRN-CNP                                                                   Attending: Nayan Kam MD;Leopoldo*       Subjective:     Chief Complaint   Patient presents with    Wound Check     Pt presents to the ER with a ulcer to the bottom of her right heel. PT states that this has been going on for x3 weeks. Ulcer is deep, red, swollen, and weeping at this time. PT states that the pain is a 6/10 and she cannot walk with how bad the pain is. Pt is not a diabetic.        HPI:   Magi Boyd is a 79 y.o. female with a PMHx of gout, HTN, chronic venous insufficiency, CKD stage IV, hypothyroidism, overactive bladder, asthma who presented for C/O worsening right heel wound x 2-3 days despite out-pt abx tx. She was advised by her podiatrist to come to the ED.    Patient states that over the past 2 to 3 days there has been increased drainage from the wound, yellow-brown in color, malodorous and soaks the entire sock.  This is associated with pain but it is not worse than normal, located at the heel, fluctuates from 6/10 to 10/10.  Due to the heel pain she has also stopped wearing her compression stockings, decreased mobility and this has led to worsening swelling of both her legs.   Patient has been visiting her podiatrist and wound care for the right heel wound, she was started on doxycycline by her D.P.M. and believes finished the course last week.  But the antibiotics not help with the healing.    Problems with her heel started  about 8 months ago.  She had initially fractured her R forefoot in 8/23 s/p she had to wear an orthotic boot. This boot was uncomfortable and starting causing pressure on her heel leading to pain and redness. Inspite of changing boots, the redness on her heel and pain worsened, now she has a draining wound.     Patient denies fevers, chills, pain anywhere else in the leg, recent falls, new fracture of the right foot.    ROS: 12 points review of system is negative except as stated in the HPI above.        ED course:   Vitals: Temp 36.5, HR 70, RR 19, /74, SpO2 96% on room air    Labs:  CMP-Na 136, K4, , bicarb 26, BUN 37, CR 2.28 [at baseline], AST 16, ALT 11  CBC-WBC 7.1, Hb 10.4, platelets 217    CRP 2.5    Imaging:  R x-ray foot showed No radiographic evidence of osteomyelitis. Skin and soft tissue defect underlying the calcaneus. 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), GERD (gastroesophageal reflux disease) (04/20/2023), Lower urinary tract symptoms (04/20/2023), Obesity, unspecified (08/01/2022), Otalgia, left ear (02/18/2020), Other abnormal glucose (09/29/2016), Other specified abnormal findings of blood chemistry (10/05/2016), Other specified disorders of ear, unspecified ear (03/18/2015), Pelvic and perineal pain (10/24/2019), Personal history of other diseases of the respiratory system (12/05/2013), Personal history of other diseases of the respiratory system (03/18/2015), Personal history of other  diseases of the respiratory system (12/17/2019), Personal history of other drug therapy (10/25/2019), Personal history of other endocrine, nutritional and metabolic disease, Personal history of other specified conditions (09/26/2013), Personal history of other specified conditions (10/29/2013), Personal history of other specified conditions (02/18/2020), Personal history of other specified conditions (02/18/2020), Personal history of other specified conditions (02/09/2015), Personal history of urinary (tract) infections (12/18/2018), Persons encountering health services in other specified circumstances (10/25/2019), Right knee pain (04/20/2023), Simple chronic bronchitis (CMS/HCC) (08/23/2023), SOB (shortness of breath) (08/23/2023), Toe pain (08/23/2023), Unspecified abnormal findings in urine (08/25/2021), Urinary tract infection, site not specified (10/18/2021), and Urinary tract infection, site not specified (08/30/2022).    Allergies   Allergen Reactions    Levofloxacin Other    Linezolid Other     Blisters in mouth     Nitrofurantoin Monohyd/M-Cryst Other    Penicillins Swelling and Other        Meds: (Not in a hospital admission)      Past surgical history:    No past surgical history on file.     Social history:      Social History     Socioeconomic History    Marital status:      Spouse name: Not on file    Number of children: Not on file    Years of education: Not on file    Highest education level: Not on file   Occupational History    Not on file   Tobacco Use    Smoking status: Never    Smokeless tobacco: Never   Vaping Use    Vaping Use: Never used   Substance and Sexual Activity    Alcohol use: Not Currently    Drug use: Never    Sexual activity: Not on file   Other Topics Concern    Not on file   Social History Narrative    Not on file     Social Determinants of Health     Financial Resource Strain: Not on file   Food Insecurity: Not on file   Transportation Needs: Not on file   Physical  Activity: Not on file   Stress: Not on file   Social Connections: Not on file   Intimate Partner Violence: Not on file   Housing Stability: Not on file        Current medications:      Current Facility-Administered Medications:     alum-mag hydroxide-simeth (Mylanta) 200-200-20 mg/5 mL oral suspension 30 mL, 30 mL, oral, Daily PRN, Yoni Napier MD    atenolol (Tenormin) tablet 100 mg, 100 mg, oral, Daily, Yoni Napier MD    atorvastatin (Lipitor) tablet 20 mg, 20 mg, oral, Daily, Yoni Napier MD    [START ON 4/4/2024] famotidine (Pepcid) tablet 10 mg, 10 mg, oral, Every other day, Yoni Napier MD    febuxostat (Uloric) tablet 40 mg, 40 mg, oral, Daily, Yoni Napier MD    ferrous gluconate (Fergon) 324 (38 Fe) mg tablet 324 mg, 324 mg, oral, BID, Yoni Napier MD    [START ON 4/4/2024] fluticasone furoate-vilanteroL (Breo Ellipta) 100-25 mcg/dose inhaler 1 puff, 1 puff, inhalation, Daily, Yoni Napier MD    furosemide (Lasix) tablet 40 mg, 40 mg, oral, Daily, Yoni Napier MD    heparin (porcine) injection 7,500 Units, 7,500 Units, subcutaneous, q8h IMANI, Yoni Napier MD    [START ON 4/4/2024] levothyroxine (Synthroid, Levoxyl) tablet 75 mcg, 75 mcg, oral, Daily, Yoni Napier MD    metroNIDAZOLE (Flagyl) 500 mg in NaCl (iso-os) 100 mL, 500 mg, intravenous, Once, Nayan Kam MD    oxybutynin (Ditropan) tablet 5 mg, 5 mg, oral, Nightly, Yoni Napier MD    polyethylene glycol (Glycolax, Miralax) packet 17 g, 17 g, oral, Daily PRN, Yoni Napier MD    [Held by provider] solifenacin (VESIcare) tablet 5 mg, 5 mg, oral, Daily, Yoni Napier MD    vancomycin (Vancocin) 2000 mg/500 ml in D5W 500 mL IV piggyback 2,000 mg, 2,000 mg, intravenous, Once, Nayan Kam MD    Current Outpatient Medications:     acyclovir (Zovirax) 400 mg tablet, Take 1 tablet (400 mg) by mouth 3 times a day for 5 days., Disp: 15 tablet, Rfl: 0    albuterol 90 mcg/actuation inhaler, Inhale 2 puffs once daily as needed for wheezing.,  Disp: 18 g, Rfl: 3    atenolol (Tenormin) 100 mg tablet, Take 1 tablet (100 mg) by mouth once daily. (Patient taking differently: Take 1 tablet (100 mg) by mouth once daily at noon. Take with meals.), Disp: 90 tablet, Rfl: 3    atorvastatin (Lipitor) 20 mg tablet, Take 1 tablet (20 mg) by mouth once daily. (Patient taking differently: Take 1 tablet (20 mg) by mouth once daily in the evening.), Disp: 90 tablet, Rfl: 1    budesonide-formoteroL (Symbicort) 160-4.5 mcg/actuation inhaler, Inhale 2 puffs once daily., Disp: 1 each, Rfl: 3    ergocalciferol (Vitamin D-2) 1.25 MG (73485 UT) capsule, Take 1 capsule (1,250 mcg) by mouth 1 (one) time per week. (Patient taking differently: Take 1 capsule (1,250 mcg) by mouth 1 (one) time per week. On WEDNESDAYS), Disp: 12 capsule, Rfl: 3    estradiol (Estrace) 0.01 % (0.1 mg/gram) vaginal cream, Insert 2 g into the vagina once daily. Apply to vagina nightly for 1 week then every Monday/Wednesday/Friday., Disp: 42.5 g, Rfl: 5    famotidine-Ca carb-mag hydrox (Pepcid Complete) -165 mg chewable tablet, Chew. PEPCID CHEW  TAKE PER DIRECTED, Disp: , Rfl:     febuxostat (Uloric) 40 mg tablet, Take 1 tablet (40 mg) by mouth once daily in the evening., Disp: , Rfl:     ferrous gluconate 324 (38 Fe) MG tablet, Take 1 tablet (324 mg) by mouth 2 times a day., Disp: , Rfl:     furosemide (Lasix) 20 mg tablet, Take 2 tablets (40 mg) by mouth once daily., Disp: 60 tablet, Rfl: 11    levothyroxine (Synthroid, Levoxyl) 75 mcg tablet, TAKE ONE TABLET BY MOUTH EVERY DAY (Patient taking differently: Take 1 tablet (75 mcg) by mouth once daily in the morning.), Disp: 90 tablet, Rfl: 1    oxybutynin (Ditropan) 5 mg tablet, Take 1 tablet (5 mg) by mouth once daily at bedtime. (Patient not taking: Reported on 4/3/2024), Disp: 30 tablet, Rfl: 11    solifenacin (VESIcare) 5 mg tablet, Take 1 tablet (5 mg) by mouth once daily. Swallow tablet whole; do not crush, chew, or split. (Patient taking  "differently: Take 1 tablet (5 mg) by mouth once daily at bedtime. Swallow tablet whole; do not crush, chew, or split.), Disp: 30 tablet, Rfl: 11    traMADol (Ultram) 50 mg tablet, Take 1 tablet (50 mg) by mouth every 8 hours if needed for severe pain (7 - 10). Last OARRS fill: 3/1924 #15 for 7 days, Disp: , Rfl:       Objective:    Vitals: Blood pressure 150/89, pulse 71, temperature 36.5 °C (97.7 °F), temperature source Temporal, resp. rate 19, height 1.6 m (5' 3\"), weight 104 kg (229 lb), SpO2 97 %.   I/Os: No intake or output data in the 24 hours ending 04/03/24 2031    Physical Examination:  GE: lying comfortable in bed, in NAD  HEENT: AT/NC, no conjunctival pallor, anicteric sclera, moist mucous membrane  Neck: supple neck, no LAD/JVD  Chest: CTAB, no adventitious sounds heard, normal rise and fall of thoracic cavity during each respiratory cycle.  CVS: s1 and s2, no MGR, RRR  Abd: soft, NT/ND, +BS, no organomegaly, No guarding/rebound tenderness  Ext: B/L LE oedema upto shins, R>L. No erythema or tenderness. R heel pressure wound stage 3, approx [id number], full thickness ulcer covered with slough, tender.   Skin: L lower trunk covered with crusted macular, vesicular lesions. The rash covers the entire L trunk and wraps around the front underneath the breast, no tenderness.   Neuro: Aox3, Cn 2-12 intact, sensation intact, Motor 5/5 globally  Psych: normal mood/affect.        Laboratory:    Laboratory finding:   Labs Reviewed   CBC WITH AUTO DIFFERENTIAL - Abnormal       Result Value    WBC 7.1      nRBC 0.0      RBC 3.42 (*)     Hemoglobin 10.4 (*)     Hematocrit 32.6 (*)     MCV 95      MCH 30.4      MCHC 31.9 (*)     RDW 12.8      Platelets 217      Neutrophils % 69.6      Immature Granulocytes %, Automated 0.3      Lymphocytes % 17.6      Monocytes % 9.3      Eosinophils % 2.5      Basophils % 0.7      Neutrophils Absolute 4.91      Immature Granulocytes Absolute, Automated 0.02      Lymphocytes Absolute 1.24  "     Monocytes Absolute 0.66      Eosinophils Absolute 0.18      Basophils Absolute 0.05     COMPREHENSIVE METABOLIC PANEL - Abnormal    Glucose 117 (*)     Sodium 136      Potassium 4.0      Chloride 103      Bicarbonate 26      Anion Gap 11      Urea Nitrogen 37 (*)     Creatinine 2.28 (*)     eGFR 22 (*)     Calcium 9.4      Albumin 3.3 (*)     Alkaline Phosphatase 64      Total Protein 7.3      AST 16      Bilirubin, Total 0.5      ALT 11     SEDIMENTATION RATE, AUTOMATED - Abnormal    Sedimentation Rate 65 (*)    C-REACTIVE PROTEIN - Abnormal    C-Reactive Protein 2.55 (*)    CBC   BASIC METABOLIC PANEL       Imaging:   XR foot right 3+ views  Narrative: Interpreted By:  Chris Martinez,   STUDY:  XR FOOT RIGHT 3+ VIEWS; ;  4/3/2024 5:31 pm      INDICATION:  Signs/Symptoms:Right foot wound with right heel failure of outpatient  antibiotics.      COMPARISON:  02/14/2024      ACCESSION NUMBER(S):  [id number]      ORDERING CLINICIAN:  JOSE CARLOS BURRELL      FINDINGS:  Right foot, three views      Skin and soft tissue defect at the plantar aspect of the calcaneus.  There is no osseous destruction or erosion radiographically. There is  diffuse severe osteopenia however. Degenerative changes in the  hindfoot.      Impression: No radiographic evidence of osteomyelitis. Skin and soft tissue  defect underlying the calcaneus. If there is persistent concern MRI  can be performed for further evaluation          MACRO:  None      Signed by: Chris Martinez 4/3/2024 5:33 PM  Dictation workstation:   [id number]      Microbiology:   Susceptibility data from last 90 days.  Collected Specimen Info Organism Amoxicillin/Clavulanate Ampicillin Ampicillin/Sulbactam Cefazolin Cefepime Ciprofloxacin Gentamicin Nitrofurantoin Piperacillin/Tazobactam Trimethoprim/Sulfamethoxazole   03/14/24 Urine from Clean Catch/Voided Enterobacter cloacae complex R R R R S S S S S R     Lab Results   Component Value Date    URINECULTURE >100,000  "Enterobacter cloacae complex (A) 03/14/2024                    No lab exists for component: \"AGALPCRNB\"     Assessment and Plan:    Problem list:   Principal Problem:    Failure of outpatient treatment      Assessment and Plan:   Magi Boyd is a 79 y.o. female with a PMHx of gout, HTN, chronic venous insufficiency, CKD stage IV, hypothyroidism, overactive bladder, asthma who presented for C/O worsening right heel wound x 2-3 days despite out-pt abx tx associated with mal-odorous, yellowish brown drainage and pain. She was advised by her podiatrist to come to the ED.    Active Issues:   #R heel pressure ulcer stage II vs stage III  #c/f wound infection vs OM  #failed   -iv vancomycin and iv cefepime for presumptive OM   -podiatry consulted to evaluate for debridement  -MRI R foot  -NPO for likely procedure by  tomorrow    #VZV infection  -present in a single dermatome  -vesicular rash has crusted over  -pt already treated with acyclovir 400mg x 5 days. Last dose tonight.  -contact precautions in place    Chronic Issues:   #Gout: C/w febuxostat  #ID: C/w ferrous gluconate  #Asthma: C/w home inhalers  #Hypothyroidism: Continue with levothyroxine  #HTN: C/w with atenolol  #HLD: c/w atorvastatin  #GERD: c/w Famotidine and maalox  #CVI and leg swelling: C/w furosemide 40 mg [prescribed by Dr. Narayan]  #CKD stage IV: FU with nephro    F: None  E: Replete as needed  N: NPO [likely podiatry procedure tomorrow]  G: famotidine  VTE: Heparin    Code:   Full Code    Disposition: 79 y.o.female admitted for R heel pressure ulcer concerning for wound infection vs OM being managed with vanc and cefepime with podiatry consult , will anticipate <48hrs eLOS.     Yoni Napier MD   [facility] Senior Resident              "

## 2024-04-04 NOTE — PROGRESS NOTES
04/04/24 1217   Discharge Planning   Living Arrangements Spouse/significant other   Support Systems Spouse/significant other   Assistance Needed patient is alert & oriented x3, needs assistance from spouse with ADL's, he reports she is unable to bear weight on R foot due to wound, he will stand pivot her to wheelchair, she has a walker as well in the home, split level home with a chair lift, does not drive   Type of Residence Private residence   Number of Stairs to Enter Residence 3   Number of Stairs Within Residence 8   Do you have animals or pets at home? No   Who is requesting discharge planning? Provider   Home or Post Acute Services In home services   Patient expects to be discharged to: TBD pending Podiatry plan will need PT/OT evals   Does the patient need discharge transport arranged? Yes   RoundTrip coordination needed? Yes   Has discharge transport been arranged? No

## 2024-04-04 NOTE — PROGRESS NOTES
Pharmacy Medication History Review    Magi Boyd is a 79 y.o. female admitted for Failure of outpatient treatment. Pharmacy reviewed the patient's glhey-fc-rebvkxzha medications and allergies for accuracy.    The list below reflectives the updated PTA list. Please review each medication in order reconciliation for additional clarification and justification.  Prior to Admission medications    Medication Sig Start Date End Date Taking? Authorizing Provider   acyclovir (Zovirax) 400 mg tablet Take 1 tablet (400 mg) by mouth 3 times a day for 5 days. 3/27/24 4/3/24  MEENU Garcia   acyclovir (Zovirax) 5 % ointment Apply 1 Application topically 5 times a day for 4 days. Space applications every 3 hours.  Patient not taking: Reported on 4/3/2024 3/27/24 3/31/24  MEENU Garcia   albuterol 90 mcg/actuation inhaler Inhale 2 puffs once daily as needed for wheezing. 4/20/23 7/19/23  MEENU Xiong   atenolol (Tenormin) 100 mg tablet Take 1 tablet (100 mg) by mouth once daily.  Patient taking differently: Take 1 tablet (100 mg) by mouth once daily at noon. Take with meals. 4/20/23 4/19/24  MEENU Xiong   atorvastatin (Lipitor) 20 mg tablet Take 1 tablet (20 mg) by mouth once daily.  Patient taking differently: Take 1 tablet (20 mg) by mouth once daily in the evening. 2/8/24   MEENU Xiong   budesonide-formoteroL (Symbicort) 160-4.5 mcg/actuation inhaler Inhale 2 puffs once daily. 4/20/23   MEENU Xiong   ergocalciferol (Vitamin D-2) 1.25 MG (81391 UT) capsule Take 1 capsule (1,250 mcg) by mouth 1 (one) time per week.  Patient taking differently: Take 1 capsule (1,250 mcg) by mouth 1 (one) time per week. On WEDNESDAYS 1/4/24 1/3/25  Laura Narayan MD   estradiol (Estrace) 0.01 % (0.1 mg/gram) vaginal cream Insert 2 g into the vagina once daily. Apply to vagina nightly for 1 week then every  Monday/Wednesday/Friday. 6/28/23 6/27/24  Normamoe Rojas, APRN-CNP   famotidine-Ca carb-mag hydrox (Pepcid Complete) -165 mg chewable tablet Chew. PEPCID CHEW  TAKE PER DIRECTED    Historical Provider, MD   febuxostat (Uloric) 40 mg tablet Take 1 tablet (40 mg) by mouth once daily in the evening.    Historical Provider, MD   ferrous gluconate 324 (38 Fe) MG tablet Take 1 tablet (324 mg) by mouth 2 times a day. 4/13/23   Historical Provider, MD   furosemide (Lasix) 20 mg tablet Take 2 tablets (40 mg) by mouth once daily. 2/26/24 2/25/25  Laura Narayan MD   levothyroxine (Synthroid, Levoxyl) 75 mcg tablet TAKE ONE TABLET BY MOUTH EVERY DAY  Patient taking differently: Take 1 tablet (75 mcg) by mouth once daily in the morning. 12/4/23   Felicita Dickinson,    oxybutynin (Ditropan) 5 mg tablet Take 1 tablet (5 mg) by mouth once daily at bedtime.  Patient not taking: Reported on 4/3/2024 2/26/24 2/25/25  Laura Narayan MD   solifenacin (VESIcare) 5 mg tablet Take 1 tablet (5 mg) by mouth once daily. Swallow tablet whole; do not crush, chew, or split.  Patient taking differently: Take 1 tablet (5 mg) by mouth once daily at bedtime. Swallow tablet whole; do not crush, chew, or split. 3/21/24 3/21/25  Arsen Allan MD   traMADol (Ultram) 50 mg tablet Take 1 tablet (50 mg) by mouth every 8 hours if needed for severe pain (7 - 10). Last OARRS fill: 3/1924 #15 for 7 days    Historical Provider, MD   fosfomycin (Monurol) 3 gram packet Take by mouth. TAKE PER DIRECTED 9/6/23 4/1/24  Historical Provider, MD   trospium (Sanctura XR) 60 mg 24 hour capsule Take 1 capsule (60 mg) by mouth once daily. TAKE PER DIRECTED 1/3/24 4/1/24  Arsen Allan MD        The list below reflectives the updated allergy list. Please review each documented allergy for additional clarification and justification.  Allergies  Reviewed by Lilliam Noyola, EMT on 4/3/2024        Severity Reactions Comments    Levofloxacin Not Specified  Other     Linezolid Not Specified Other Blisters in mouth     Nitrofurantoin Monohyd/m-cryst Not Specified Other     Penicillins Not Specified Swelling, Other             Below are additional concerns with the patient's PTA list.      Lubna Malik

## 2024-04-04 NOTE — CARE PLAN
The patient's goals for the shift include  free from injury throughout shift    The clinical goals for the shift include patient remines free from falls and injury throughout shift      Problem: Pain  Goal: Takes deep breaths with improved pain control throughout the shift  Outcome: Progressing  Goal: Turns in bed with improved pain control throughout the shift  Outcome: Progressing  Goal: Walks with improved pain control throughout the shift  Outcome: Progressing  Goal: Performs ADL's with improved pain control throughout shift  Outcome: Progressing  Goal: Participates in PT with improved pain control throughout the shift  Outcome: Progressing  Goal: Free from opioid side effects throughout the shift  Outcome: Progressing  Goal: Free from acute confusion related to pain meds throughout the shift  Outcome: Progressing     Problem: Fall/Injury  Goal: Not fall by end of shift  Outcome: Progressing  Goal: Be free from injury by end of the shift  Outcome: Progressing  Goal: Verbalize understanding of personal risk factors for fall in the hospital  Outcome: Progressing  Goal: Verbalize understanding of risk factor reduction measures to prevent injury from fall in the home  Outcome: Progressing  Goal: Use assistive devices by end of the shift  Outcome: Progressing  Goal: Pace activities to prevent fatigue by end of the shift  Outcome: Progressing     Problem: Skin  Goal: Decreased wound size/increased tissue granulation at next dressing change  Outcome: Progressing  Goal: Participates in plan/prevention/treatment measures  Outcome: Progressing  Goal: Prevent/manage excess moisture  Outcome: Progressing  Goal: Prevent/minimize sheer/friction injuries  Outcome: Progressing  Goal: Promote/optimize nutrition  Outcome: Progressing  Goal: Promote skin healing  Outcome: Progressing  Flowsheets (Taken 4/4/2024 0859)  Promote skin healing: Turn/reposition every 2 hours/use positioning/transfer devices

## 2024-04-04 NOTE — HOSPITAL COURSE
Magi Boyd is a 79 y.o. female with a PMHx of gout, HTN, chronic venous insufficiency, CKD stage IV, hypothyroidism, overactive bladder, asthma who presented for C/O worsening right heel wound x 2-3 days despite out-pt abx tx. She was advised by her podiatrist to come to the ED.     In the ED, the patient was nonseptic appearing, but was transitioned to IV antibiotics (cefepime/vanc) for suspected osteomyelitis. Podiatry was consulted. X-ray of the right foot showed no osteomyelitis but was nondiagnostic. MRI  showed plantar heel soft tissue ulceration with subjacent cellulitis and mild reactive osteitis along the plantar cortex of posterior calcaneal tuberosity with no definite MRI evidence of osteomyelitis.     On the floors, the patient was placed on contact precautions for recent episode of shingles with no signs of disseminated shingles. The patient did not have any open sores at this time. ID was consulted and recommended treatment with cefazolin for right heel non-healing wound / possible cellulitis. Podiatry debrided wound 4/6. Patient is stable for discharge home.    Medication changes:  Atenolol - held for hypotension and bradycardia  Oxybutinin - held for hypotension and bradycardia  Furosemide - changed to PRN for weight gain >2kg    Please follow up with the following providers outpatient:  - PCP  - Podiatry  - Cardiology

## 2024-04-04 NOTE — CARE PLAN
The patient's goals for the shift include      The clinical goals for the shift include      Over the shift, the patient did not make progress toward the following goals. Barriers to progression include . Recommendations to address these barriers include .    Problem: Skin  Goal: Prevent/minimize sheer/friction injuries  Outcome: Progressing     Problem: Skin  Goal: Promote skin healing  Outcome: Progressing

## 2024-04-04 NOTE — CONSULTS
Inpatient consult to Podiatry  Consult performed by: Vanessa Oliva DPM  Consult ordered by: Nayan Kam MD  Reason for consult: Right heel ulceration          Reason For Consult  Right heel ulceration    History Of Present Illness  Magi Boyd is a 79 y.o. female presenting with a right heel ulceration which has failed outpatient therapy.  Patient normally sees Dr. Martino for podiatric care.  Was just seen by her yesterday and advised to come into hospital.  Patient believe the wound started from a blister she had on the bottom of the foot last month that became a callus and was trimmed causing a wound.  Has been sore ever since.  Actually noted to be a wound on 3/19/2024.       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), GERD (gastroesophageal reflux disease) (04/20/2023), Lower urinary tract symptoms (04/20/2023), Obesity, unspecified (08/01/2022), Otalgia, left ear (02/18/2020), Other abnormal glucose (09/29/2016), Other specified abnormal findings of blood chemistry (10/05/2016), Other specified disorders of ear, unspecified ear (03/18/2015), Pelvic and perineal pain (10/24/2019), Personal history of other diseases of the respiratory system (12/05/2013), Personal history of other diseases of the respiratory system (03/18/2015), Personal history of other diseases of the respiratory system (12/17/2019), Personal history of other drug therapy (10/25/2019), Personal history of other endocrine, nutritional and metabolic disease, Personal history of other specified conditions (09/26/2013), Personal history of other specified conditions  (10/29/2013), Personal history of other specified conditions (02/18/2020), Personal history of other specified conditions (02/18/2020), Personal history of other specified conditions (02/09/2015), Personal history of urinary (tract) infections (12/18/2018), Persons encountering health services in other specified circumstances (10/25/2019), Right knee pain (04/20/2023), Simple chronic bronchitis (CMS/HCC) (08/23/2023), SOB (shortness of breath) (08/23/2023), Toe pain (08/23/2023), Unspecified abnormal findings in urine (08/25/2021), Urinary tract infection, site not specified (10/18/2021), and Urinary tract infection, site not specified (08/30/2022).    Surgical History  She has no past surgical history on file.     Social History  She reports that she has never smoked. She has never used smokeless tobacco. She reports that she does not currently use alcohol. She reports that she does not use drugs.    Family History  Family History   Problem Relation Name Age of Onset    Heart disease Mother      Brain cancer Father      Lung cancer Father      Heart disease Brother      Heart disease Brother      Heart disease Brother      Heart disease Brother      Diabetes Other FAM HX         Allergies  Levofloxacin, Linezolid, Nitrofurantoin monohyd/m-cryst, and Penicillins    Review of Systems  +for above    Physical Exam  GE: lying comfortable in bed, in NAD  HEENT: AT/NC, no conjunctival pallor, anicteric sclera, moist mucous membrane  Neck: supple neck, no LAD/JVD  Chest: CTAB, no adventitious sounds heard, normal rise and fall of thoracic cavity during each respiratory cycle.  CVS: s1 and s2, no MGR, RRR  Abd: soft, NT/ND, +BS, no organomegaly, No guarding/rebound tenderness  Ext:  palpable pedal pulses B/L; ulceration to right plantar heel with eshcar;  no fluctuance;  bleeding;  +malodor;  mild erythema surrounding.  Skin: L lower trunk covered with crusted macular, vesicular lesions. The rash covers the entire L trunk  "and wraps around the front underneath the breast, no tenderness.   Neuro: Aox3, Cn 2-12 intact, sensation intact, Motor 5/5 globally  Psych: normal mood/affect.      Last Recorded Vitals  Blood pressure 125/73, pulse 65, temperature 36.3 °C (97.3 °F), temperature source Temporal, resp. rate 19, height 1.6 m (5' 3\"), weight 104 kg (229 lb), SpO2 96 %.    Relevant Results  Susceptibility data from last 90 days.  Collected Specimen Info Organism Amoxicillin/Clavulanate Ampicillin Ampicillin/Sulbactam Cefazolin Cefepime Ciprofloxacin Gentamicin Nitrofurantoin Piperacillin/Tazobactam Trimethoprim/Sulfamethoxazole   03/14/24 Urine from Clean Catch/Voided Enterobacter cloacae complex R R R R S S S S S R   Vascular US lower extremity venous duplex bilateral    Result Date: 4/4/2024          [place] [address]  Tel [phone] and Fax [phone]  Vascular Lab Report [facility] US LOWER EXTREMITY VENOUS DUPLEX BILATERAL  Patient Name:      CHANDNI COOK JENNIFER Khalil Physician: [id number] Dayday De La Rosa DO Study Date:        4/4/2024            Ordering           [id number] BERNIE MCDANIEL                                        Physician:         AMANDA MRN/PID:           [id number]            Technologist:      Laila Aleman RVT Accession#:        [id number]        Technologist 2: Date of Birth/Age: 1945 / 79      Encounter#:        [id number]                    years Gender:            F Admission Status:  Inpatient           Location           [facility]                                        Performed:  Diagnosis/ICD: Localized (leg) edema-R60.0 CPT Codes:     57395 Peripheral venous duplex scan for DVT complete  CONCLUSIONS: Right Lower Venous: No evidence of acute deep vein thrombus visualized in the right lower extremity. Cannot rule out thrombus in non-visualized Peroneal vein due to edema. Right posterior tibial veins were visualized in segments due to edema; cannot rule out " DVT in non-visualized segments. Left Lower Venous: No evidence of acute deep vein thrombus visualized in the left lower extremity. Cannot rule out thrombus in non-visualized peroneal vein. Left posterior tibial veins were visualized in segments due to edema; cannot rule out DVT in non-visualized segments.  Imaging & Doppler Findings:  Right                 Compressible Thrombus        Flow Distal External Iliac     Yes        None   Spontaneous/Phasic CFV                       Yes        None   Spontaneous/Phasic PFV                       Yes        None FV Proximal               Yes        None   Spontaneous/Phasic FV Mid                    Yes        None FV Distal                 Yes        None Popliteal                 Yes        None   Spontaneous/Phasic  Left                  Compress Thrombus        Flow Distal External Iliac   Yes      None   Spontaneous/Phasic CFV                     Yes      None   Spontaneous/Phasic PFV                     Yes      None FV Proximal             Yes      None   Spontaneous/Phasic FV Mid                  Yes      None FV Distal               Yes      None Popliteal               Yes      None   Spontaneous/Phasic PTV                     Yes      None  [id number] Dayday De La Rosa DO Electronically signed by [id number] Dayday De La Rosa DO on 4/4/2024 at 4:20:01 PM  ** Final **     MR foot right wo IV contrast    Result Date: 4/4/2024  Interpreted By:  Imelda David, STUDY: MRI of the right lower extremity without IV contrast;   INDICATION: Signs/Symptoms:concern for OM of R foot; Signs/Symptoms:r/out osteomyelitis   COMPARISON: Right foot radiographs dated 04/03/2024   ACCESSION NUMBER(S): [id number]; [id number]   ORDERING CLINICIAN: HAKAN BURNETTE   TECHNIQUE: Multiplanar multisequence MRI of the  right lower extremity was performed  without IV contrast.   FINDINGS: Soft tissues: There is a plantar soft tissue ulceration at the level of heel measuring a proximally 3 x 2  cm in AP and transverse dimensions respectively. There is diffuse subcutaneous soft tissue edema throughout the plantar soft tissues of the foot extending distally to the level of forefoot. This is suggestive of cellulitis. There is also diffuse circumferential subcutaneous soft tissue edema extending throughout the included portion of distal leg above the level of ankle joint and extends distally along the dorsal lateral aspect of the foot. This is a nonspecific finding and could represent cellulitis versus venous stasis versus lymphedema. Evaluation for abscess is limited due to noncontrast technique. Within this limitation, there is no confluence fluid collection subjacent to the site of ulceration to definitively suggest an abscess. There is effacement of normal fat signal in the subcutaneous soft tissues at the level of ulceration extending deep to the level of the plantar fascia/posterior calcaneal tuberosity.   There is small volume fluid within the peroneal compartment tendon sheath suggestive of mild tenosynovitis. The evaluation of tendon morphology is limited due to mild motion artifacts on axial sequences. The flexor and extensor compartment tendons appear grossly unremarkable. There is mild patchy T2 hyperintense signal in the plantar foot musculature with mild fatty atrophy on T1 weighted sequences. This is most likely favored to represent diabetic ischemic myopathy. No evidence of intramuscular fluid collections within limits of noncontrast technique. There is small plantar calcaneal enthesophyte with mild thickening of the central band of plantar fascia suggestive of chronic plantar fasciitis. No abnormal signal is noted in the plantar fascia within limits of evaluation.   Bones:  There is effacement of normal fat signal in the plantar soft tissues at the level of posterior calcaneal tuberosity adjacent to the site of ulceration (as seen on sagittal image 13/32). There is very faint bone marrow edema  along the plantar cortex of posterior calcaneal tuberosity subjacent to the level of ulceration. However no evidence of confluence loss of normal fat signal to suggest osteomyelitis. There are mild degenerative changes at the midfoot articulations, more pronounced at 1st through 5th tarsometatarsal joints with mild subchondral cystic changes. Tibiotalar joint articulation is maintained and demonstrates mild-to-moderate degenerative changes with trace tibiotalar joint effusion. Otherwise rest of the osseous structures appear grossly unremarkable.       1. Plantar heel soft tissue ulceration with subjacent cellulitis and mild reactive osteitis along the plantar cortex of posterior calcaneal tuberosity as described above. No definite MRI evidence of osteomyelitis at this point in time. No evidence of abscess within limits of noncontrast technique. 2. Mild tenosynovitis of the peroneal compartment tendons in the retro malleolar groove. This is most likely favored to be reactive, however infectious tenosynovitis can not be completely excluded. 3. Extensive circumferential subcutaneous soft tissue edema in the visualized distal leg extending along the dorsal lateral aspect of the foot is nonspecific finding. This could be related to cellulitis versus venous stasis versus lymphedema. Recommend clinical correlation. 4. Signal abnormality in the plantar foot musculature is most likely favored to be related to chronic diabetic ischemic myopathy. Infectious myositis can also be considered in the differential.   MACRO: None   Signed by: Imelda David 4/4/2024 1:12 PM Dictation workstation:   [id number]    MR ankle right wo IV contrast    Result Date: 4/4/2024  Interpreted By:  Imelda David, STUDY: MRI of the right lower extremity without IV contrast;   INDICATION: Signs/Symptoms:concern for OM of R foot; Signs/Symptoms:r/out osteomyelitis   COMPARISON: Right foot radiographs dated 04/03/2024   ACCESSION NUMBER(S):  [id number]; [id number]   ORDERING CLINICIAN: HAKAN BURNETTE   TECHNIQUE: Multiplanar multisequence MRI of the  right lower extremity was performed  without IV contrast.   FINDINGS: Soft tissues: There is a plantar soft tissue ulceration at the level of heel measuring a proximally 3 x 2 cm in AP and transverse dimensions respectively. There is diffuse subcutaneous soft tissue edema throughout the plantar soft tissues of the foot extending distally to the level of forefoot. This is suggestive of cellulitis. There is also diffuse circumferential subcutaneous soft tissue edema extending throughout the included portion of distal leg above the level of ankle joint and extends distally along the dorsal lateral aspect of the foot. This is a nonspecific finding and could represent cellulitis versus venous stasis versus lymphedema. Evaluation for abscess is limited due to noncontrast technique. Within this limitation, there is no confluence fluid collection subjacent to the site of ulceration to definitively suggest an abscess. There is effacement of normal fat signal in the subcutaneous soft tissues at the level of ulceration extending deep to the level of the plantar fascia/posterior calcaneal tuberosity.   There is small volume fluid within the peroneal compartment tendon sheath suggestive of mild tenosynovitis. The evaluation of tendon morphology is limited due to mild motion artifacts on axial sequences. The flexor and extensor compartment tendons appear grossly unremarkable. There is mild patchy T2 hyperintense signal in the plantar foot musculature with mild fatty atrophy on T1 weighted sequences. This is most likely favored to represent diabetic ischemic myopathy. No evidence of intramuscular fluid collections within limits of noncontrast technique. There is small plantar calcaneal enthesophyte with mild thickening of the central band of plantar fascia suggestive of chronic plantar fasciitis. No  abnormal signal is noted in the plantar fascia within limits of evaluation.   Bones:  There is effacement of normal fat signal in the plantar soft tissues at the level of posterior calcaneal tuberosity adjacent to the site of ulceration (as seen on sagittal image 13/32). There is very faint bone marrow edema along the plantar cortex of posterior calcaneal tuberosity subjacent to the level of ulceration. However no evidence of confluence loss of normal fat signal to suggest osteomyelitis. There are mild degenerative changes at the midfoot articulations, more pronounced at 1st through 5th tarsometatarsal joints with mild subchondral cystic changes. Tibiotalar joint articulation is maintained and demonstrates mild-to-moderate degenerative changes with trace tibiotalar joint effusion. Otherwise rest of the osseous structures appear grossly unremarkable.       1. Plantar heel soft tissue ulceration with subjacent cellulitis and mild reactive osteitis along the plantar cortex of posterior calcaneal tuberosity as described above. No definite MRI evidence of osteomyelitis at this point in time. No evidence of abscess within limits of noncontrast technique. 2. Mild tenosynovitis of the peroneal compartment tendons in the retro malleolar groove. This is most likely favored to be reactive, however infectious tenosynovitis can not be completely excluded. 3. Extensive circumferential subcutaneous soft tissue edema in the visualized distal leg extending along the dorsal lateral aspect of the foot is nonspecific finding. This could be related to cellulitis versus venous stasis versus lymphedema. Recommend clinical correlation. 4. Signal abnormality in the plantar foot musculature is most likely favored to be related to chronic diabetic ischemic myopathy. Infectious myositis can also be considered in the differential.   MACRO: None   Signed by: Imelda David 4/4/2024 1:12 PM Dictation workstation:   [id number]    XR foot right  3+ views    Result Date: 4/3/2024  Interpreted By:  Chris Martinez, STUDY: XR FOOT RIGHT 3+ VIEWS; ;  4/3/2024 5:31 pm   INDICATION: Signs/Symptoms:Right foot wound with right heel failure of outpatient antibiotics.   COMPARISON: 02/14/2024   ACCESSION NUMBER(S): [id number]   ORDERING CLINICIAN: JOSE CARLOS BURRELL   FINDINGS: Right foot, three views   Skin and soft tissue defect at the plantar aspect of the calcaneus. There is no osseous destruction or erosion radiographically. There is diffuse severe osteopenia however. Degenerative changes in the hindfoot.       No radiographic evidence of osteomyelitis. Skin and soft tissue defect underlying the calcaneus. If there is persistent concern MRI can be performed for further evaluation     MACRO: None   Signed by: Chris Martinez 4/3/2024 5:33 PM Dictation workstation:   [id number]   Results for orders placed or performed during the hospital encounter of 04/03/24 (from the past 24 hour(s))   CBC   Result Value Ref Range    WBC 5.0 4.4 - 11.3 x10*3/uL    nRBC 0.0 0.0 - 0.0 /100 WBCs    RBC 3.29 (L) 4.00 - 5.20 x10*6/uL    Hemoglobin 10.0 (L) 12.0 - 16.0 g/dL    Hematocrit 32.2 (L) 36.0 - 46.0 %    MCV 98 80 - 100 fL    MCH 30.4 26.0 - 34.0 pg    MCHC 31.1 (L) 32.0 - 36.0 g/dL    RDW 12.7 11.5 - 14.5 %    Platelets 184 150 - 450 x10*3/uL   Basic metabolic panel   Result Value Ref Range    Glucose 98 74 - 99 mg/dL    Sodium 133 (L) 136 - 145 mmol/L    Potassium 3.8 3.5 - 5.3 mmol/L    Chloride 102 98 - 107 mmol/L    Bicarbonate 25 21 - 32 mmol/L    Anion Gap 10 10 - 20 mmol/L    Urea Nitrogen 32 (H) 6 - 23 mg/dL    Creatinine 2.05 (H) 0.50 - 1.05 mg/dL    eGFR 25 (L) >60 mL/min/1.73m*2    Calcium 8.9 8.6 - 10.3 mg/dL        Assessment/Plan      Right plantar heel ulceration  MRI pending.  Doubt will need surgical intervention.  IV antibiotics per ID.  Will follow.  Adaptic, ABD, kerlix and ace bandage.  Keep heel offloaded.    I spent 30 minutes in the professional and  overall care of this patient.

## 2024-04-04 NOTE — PROGRESS NOTES
Magi Boyd is a 79 y.o. female on day 1 of admission presenting with Failure of outpatient treatment.      Subjective   No acute overnight events. Is feeling okay today with minimal right foot pain while at rest.  Denies any fever, chills, abdominal pain, constipation or diarrhea.       Objective     Last Recorded Vitals  /73   Pulse 65   Temp 36.3 °C (97.3 °F) (Temporal)   Resp 19   Wt 104 kg (229 lb)   SpO2 96%   Intake/Output last 3 Shifts:  No intake or output data in the 24 hours ending 04/04/24 0828    Admission Weight  Weight: 104 kg (229 lb) (04/03/24 1610)    Daily Weight  04/03/24 : 104 kg (229 lb)    Image Results  XR foot right 3+ views  Narrative: Interpreted By:  Chris Martinez,   STUDY:  XR FOOT RIGHT 3+ VIEWS; ;  4/3/2024 5:31 pm      INDICATION:  Signs/Symptoms:Right foot wound with right heel failure of outpatient  antibiotics.      COMPARISON:  02/14/2024      ACCESSION NUMBER(S):  [id number]      ORDERING CLINICIAN:  JOSE CARLOS BURRELL      FINDINGS:  Right foot, three views      Skin and soft tissue defect at the plantar aspect of the calcaneus.  There is no osseous destruction or erosion radiographically. There is  diffuse severe osteopenia however. Degenerative changes in the  hindfoot.      Impression: No radiographic evidence of osteomyelitis. Skin and soft tissue  defect underlying the calcaneus. If there is persistent concern MRI  can be performed for further evaluation          MACRO:  None      Signed by: Chris Martinez 4/3/2024 5:33 PM  Dictation workstation:   [id number]      Physical Exam  Constitutional:       Appearance: Normal appearance.   HENT:      Head: Normocephalic and atraumatic.   Cardiovascular:      Rate and Rhythm: Normal rate and regular rhythm.   Pulmonary:      Effort: Pulmonary effort is normal.      Breath sounds: Normal breath sounds.   Abdominal:      General: Abdomen is flat.      Palpations: Abdomen is soft.   Musculoskeletal:      Right  lower leg: Edema present.      Left lower leg: Edema present.      Comments: Right foot wrapped in ace wrap   Skin:     General: Skin is warm and dry.      Comments: T5 dermatomal crusted over leisons   Neurological:      Mental Status: She is alert and oriented to person, place, and time. Mental status is at baseline.         Relevant Results               Assessment/Plan      Principal Problem:    Failure of outpatient treatment    Magi Boyd is a 79 y.o. female with a PMHx of gout, HTN, chronic venous insufficiency, CKD stage IV, hypothyroidism, overactive bladder, asthma who presented for C/O worsening right heel wound x 2-3 days despite out-pt abx tx associated with mal-odorous, yellowish brown drainage and pain. She was advised by her podiatrist to come to the ED.     Active Issues:   #R heel pressure ulcer stage II vs stage III  #c/f wound infection vs OM  #RLE edema > LLE edema   -iv vancomycin and iv cefepime for presumptive OM   -podiatry consulted to evaluate for debridement  -ID consulted for abx management  -MRI R foot/ankle pending  -B/l dvt us pending     #VZV infection  -present in a single dermatome  -vesicular rash has crusted over  -pt already treated with acyclovir 400mg x 5 days. Last dose tonight.  -contact precautions in place     Chronic Issues:   #Gout: C/w febuxostat  #ID: C/w ferrous gluconate  #Asthma: C/w home inhalers  #Hypothyroidism: Continue with levothyroxine  #HTN: C/w with atenolol  #HLD: c/w atorvastatin  #GERD: c/w Famotidine and maalox  #CVI and leg swelling: C/w furosemide 40 mg [prescribed by Dr. Narayan]  #CKD stage IV: FU with nephro     F: None  E: Replete as needed  N: NPO [likely podiatry procedure tomorrow]  G: famotidine  VTE: Heparin     Code:   Full Code     Disposition: 79 y.o.female admitted for R heel pressure ulcer concerning for wound infection vs OM being managed with vanc and cefepime with podiatry and ID consulted.              Mary Tucker  MD

## 2024-04-04 NOTE — ED PROVIDER NOTES
HPI   Chief Complaint   Patient presents with    Wound Check     Pt presents to the ER with a ulcer to the bottom of her right heel. PT states that this has been going on for x3 weeks. Ulcer is deep, red, swollen, and weeping at this time. PT states that the pain is a 6/10 and she cannot walk with how bad the pain is. Pt is not a diabetic.        Patient is a 79-year-old female presenting for foot wound to the right lower extremity.  The patient has been being treated on outpatient basis by podiatry with regards to clindamycin and doxycycline without improvement of foot wound.  She does have significant peripheral edema which is longstanding.  Has been attempting to use compression stockings but they are no longer fitting.  Denies any fever at home.  Has had decreased amatory status since then.    I did speak with the podiatrist who ultimately referred the patient for hospitalization and IV antibiotics.                          [place] Coma Scale Score: 15                     Patient History   Past Medical History:   Diagnosis Date    Acute UTI 04/20/2023    Adverse reaction to NSAIDs, sequela 04/20/2023    Benign essential hypertension 04/20/2023    Bilateral leg and foot pain 04/20/2023    Body mass index (BMI)40.0-44.9, adult 10/18/2021    Body mass index (BMI) of 40.0 to 44.9 in adult    CKD (chronic kidney disease) 04/20/2023    Disorder of both eustachian tubes 04/20/2023    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    GERD (gastroesophageal reflux disease) 04/20/2023    Lower urinary tract symptoms 04/20/2023    Obesity, unspecified 08/01/2022    Class 2 obesity with body mass index (BMI) of 39.0 to 39.9 in adult    Otalgia, left ear 02/18/2020    Earache, left    Other abnormal glucose 09/29/2016    Abnormal glucose    Other specified abnormal findings of blood chemistry 10/05/2016    Abnormal thyroid blood test    Other specified disorders of ear, unspecified ear 03/18/2015    Fullness in ear    Pelvic and  perineal pain 10/24/2019    Pelvic pressure in female    Personal history of other diseases of the respiratory system 12/05/2013    History of acute bronchitis    Personal history of other diseases of the respiratory system 03/18/2015    History of acute sinusitis    Personal history of other diseases of the respiratory system 12/17/2019    History of upper respiratory infection    Personal history of other drug therapy 10/25/2019    History of influenza vaccination    Personal history of other endocrine, nutritional and metabolic disease     History of dehydration    Personal history of other specified conditions 09/26/2013    History of fatigue    Personal history of other specified conditions 10/29/2013    History of shortness of breath    Personal history of other specified conditions 02/18/2020    History of nasal congestion    Personal history of other specified conditions 02/18/2020    History of postnasal drip    Personal history of other specified conditions 02/09/2015    History of diarrhea    Personal history of urinary (tract) infections 12/18/2018    History of urinary tract infection    Persons encountering health services in other specified circumstances 10/25/2019    Encounter to establish care    Right knee pain 04/20/2023    Simple chronic bronchitis (CMS/HCC) 08/23/2023    SOB (shortness of breath) 08/23/2023    Toe pain 08/23/2023    Unspecified abnormal findings in urine 08/25/2021    Abnormal urinalysis    Urinary tract infection, site not specified 10/18/2021    Acute lower UTI    Urinary tract infection, site not specified 08/30/2022    Acute UTI     No past surgical history on file.  Family History   Problem Relation Name Age of Onset    Heart disease Mother      Brain cancer Father      Lung cancer Father      Heart disease Brother      Heart disease Brother      Heart disease Brother      Heart disease Brother      Diabetes Other FAM HX      Social History     Tobacco Use    Smoking  status: Never    Smokeless tobacco: Never   Vaping Use    Vaping Use: Never used   Substance Use Topics    Alcohol use: Not Currently    Drug use: Never       Physical Exam   ED Triage Vitals [04/03/24 1610]   Temp Heart Rate Resp BP   36.5 °C (97.7 °F) 70 19 121/74      SpO2 Temp Source Heart Rate Source Patient Position   96 % Temporal Monitor Sitting      BP Location FiO2 (%)     Left arm --       Physical Exam  Constitutional:       General: She is not in acute distress.     Appearance: She is not toxic-appearing.   Musculoskeletal:      Comments: Intact peripheral pulses with 2+ DP bilaterally.  There is significant nonpitting edema to the bilateral lower extremities.   Skin:     Comments: Ulcer to the right heel approximately 3-1/2 cm in diameter without significant purulence.  There is surrounding erythema.    Posterior left back with shingles rash with nonopen lesion.   Neurological:      General: No focal deficit present.      Mental Status: She is alert and oriented to person, place, and time.         ED Course & MDM   Diagnoses as of 04/03/24 2242   Failure of outpatient treatment   Ulcer of right foot, unspecified ulcer stage (CMS/[facility])   Cellulitis of other specified site       Medical Decision Making  Patient is a 79-year-old female presenting for foot wound with failure of outpatient antibiotics.  The patient is nonseptic appearing but was transition to IV antibiotics.  Podiatry was placed as consultation services.  X-ray which was independently interpreted was reassuring with regards to no osteomyelitis but nondiagnostic and needs potential MRI on an inpatient basis.  The patient with regards to her shingles was placed on contact precautions and is not immunocompromise and shows no signs of disseminated shingles.  The patient does not have any open sores at this time.        Procedure  Procedures     Nayan Kam MD  04/03/24 2631

## 2024-04-04 NOTE — CONSULTS
Consults  Referred by MILENA Mortensen MD: Ofe Herrera, APRN-CNP    Reason For Consult  Rt heel wound infection    History Of Present Illness  Magi Boyd is a 79 y.o. female, hx of HTN, hx of CKD, hx of .legs stasis, hx of hypothyroidism, hx of recurrent UTI, hx of recent treatment for zoster,  hx of Rt heel none healing wound for few months, she was admitted for a 3 day hx of increasing drainage from the heel wound with increased pain with walking, no fever, no leg redness, the WBC were N, the xray without bony changes     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), GERD (gastroesophageal reflux disease) (04/20/2023), Lower urinary tract symptoms (04/20/2023), Obesity, unspecified (08/01/2022), Otalgia, left ear (02/18/2020), Other abnormal glucose (09/29/2016), Other specified abnormal findings of blood chemistry (10/05/2016), Other specified disorders of ear, unspecified ear (03/18/2015), Pelvic and perineal pain (10/24/2019), Personal history of other diseases of the respiratory system (12/05/2013), Personal history of other diseases of the respiratory system (03/18/2015), Personal history of other diseases of the respiratory system (12/17/2019), Personal history of other drug therapy (10/25/2019), Personal history of other endocrine, nutritional and metabolic disease, Personal history of other specified conditions (09/26/2013), Personal history of other specified conditions (10/29/2013), Personal history of other specified conditions (02/18/2020), Personal history of other specified conditions (02/18/2020), Personal history of  other specified conditions (02/09/2015), Personal history of urinary (tract) infections (12/18/2018), Persons encountering health services in other specified circumstances (10/25/2019), Right knee pain (04/20/2023), Simple chronic bronchitis (CMS/HCC) (08/23/2023), SOB (shortness of breath) (08/23/2023), Toe pain (08/23/2023), Unspecified abnormal findings in urine (08/25/2021), Urinary tract infection, site not specified (10/18/2021), and Urinary tract infection, site not specified (08/30/2022).    Surgical History  She has no past surgical history on file.     Social History     Occupational History    Not on file   Tobacco Use    Smoking status: Never    Smokeless tobacco: Never   Vaping Use    Vaping Use: Never used   Substance and Sexual Activity    Alcohol use: Not Currently    Drug use: Never    Sexual activity: Not on file     Travel History   Travel since 03/04/24    No documented travel since 03/04/24            Family History  Family History   Problem Relation Name Age of Onset    Heart disease Mother      Brain cancer Father      Lung cancer Father      Heart disease Brother      Heart disease Brother      Heart disease Brother      Heart disease Brother      Diabetes Other FAM HX      Allergies  Levofloxacin, Linezolid, Nitrofurantoin monohyd/m-cryst, and Penicillins     Immunization History   Administered Date(s) Administered    Flu vaccine, quadrivalent, high-dose, preservative free, age 65y+ (FLUZONE) 12/16/2020    Influenza, High Dose Seasonal, Preservative Free 11/13/2017, 12/11/2018, 10/24/2019    Influenza, injectable, quadrivalent 11/22/2017    Influenza, seasonal, injectable 11/12/2014, 10/16/2015, 09/29/2016    Influenza, seasonal, injectable, preservative free 11/01/2014    Pfizer Purple Cap SARS-CoV-2 02/12/2021, 03/09/2021, 12/14/2021    Pneumococcal conjugate vaccine, 13-valent (PREVNAR 13) 12/11/2019    Pneumococcal polysaccharide vaccine, 23-valent, age 2 years and older (PNEUMOVAX 23)  2009   Pneumonia vaccine is up to date  Alejandre fall risk 60, preventive protocol was implemented  Depression screen is negative    Medications  Home medications:  Medications Prior to Admission   Medication Sig Dispense Refill Last Dose    [] acyclovir (Zovirax) 400 mg tablet Take 1 tablet (400 mg) by mouth 3 times a day for 5 days. 15 tablet 0 4/3/2024 at 3PM    [] acyclovir (Zovirax) 5 % ointment Apply 1 Application topically 5 times a day for 4 days. Space applications every 3 hours. (Patient not taking: Reported on 4/3/2024) 5 g 0 Not Taking    albuterol 90 mcg/actuation inhaler Inhale 2 puffs once daily as needed for wheezing. 18 g 3 2024    atenolol (Tenormin) 100 mg tablet Take 1 tablet (100 mg) by mouth once daily. (Patient taking differently: Take 1 tablet (100 mg) by mouth once daily at noon. Take with meals.) 90 tablet 3 2024 at pm    atorvastatin (Lipitor) 20 mg tablet Take 1 tablet (20 mg) by mouth once daily. (Patient taking differently: Take 1 tablet (20 mg) by mouth once daily in the evening.) 90 tablet 1 2024 at pm    budesonide-formoteroL (Symbicort) 160-4.5 mcg/actuation inhaler Inhale 2 puffs once daily. 1 each 3 Unknown    ergocalciferol (Vitamin D-2) 1.25 MG (31298 UT) capsule Take 1 capsule (1,250 mcg) by mouth 1 (one) time per week. (Patient taking differently: Take 1 capsule (1,250 mcg) by mouth 1 (one) time per week. On ) 12 capsule 3 3/27/2024    estradiol (Estrace) 0.01 % (0.1 mg/gram) vaginal cream Insert 2 g into the vagina once daily. Apply to vagina nightly for 1 week then every Monday/Wednesday/Friday. 42.5 g 5     famotidine-Ca carb-mag hydrox (Pepcid Complete) -165 mg chewable tablet Chew. PEPCID CHEW  TAKE PER DIRECTED   2024    febuxostat (Uloric) 40 mg tablet Take 1 tablet (40 mg) by mouth once daily in the evening.   2024 at PM    ferrous gluconate 324 (38 Fe) MG tablet Take 1 tablet (324 mg) by mouth 2 times a day.    "4/2/2024 at PM    furosemide (Lasix) 20 mg tablet Take 2 tablets (40 mg) by mouth once daily. 60 tablet 11 4/1/2024    levothyroxine (Synthroid, Levoxyl) 75 mcg tablet TAKE ONE TABLET BY MOUTH EVERY DAY (Patient taking differently: Take 1 tablet (75 mcg) by mouth once daily in the morning.) 90 tablet 1 4/3/2024 at am    oxybutynin (Ditropan) 5 mg tablet Take 1 tablet (5 mg) by mouth once daily at bedtime. (Patient not taking: Reported on 4/3/2024) 30 tablet 11 Not Taking    solifenacin (VESIcare) 5 mg tablet Take 1 tablet (5 mg) by mouth once daily. Swallow tablet whole; do not crush, chew, or split. (Patient taking differently: Take 1 tablet (5 mg) by mouth once daily at bedtime. Swallow tablet whole; do not crush, chew, or split.) 30 tablet 11 4/2/2024 at pm    traMADol (Ultram) 50 mg tablet Take 1 tablet (50 mg) by mouth every 8 hours if needed for severe pain (7 - 10). Last [place] fill: 3/1924 #15 for 7 days   4/2/2024 at pm     Current medications:  Scheduled medications  atenolol, 100 mg, oral, Daily  atorvastatin, 20 mg, oral, Daily  cefepime, 1 g, intravenous, q12h  famotidine, 10 mg, oral, Every other day  febuxostat, 40 mg, oral, Daily  ferrous gluconate, 324 mg, oral, BID  fluticasone furoate-vilanteroL, 1 puff, inhalation, Daily  furosemide, 40 mg, oral, Daily  heparin (porcine), 7,500 Units, subcutaneous, q8h IMANI  levothyroxine, 75 mcg, oral, Daily  oxybutynin, 5 mg, oral, Nightly      Continuous medications     PRN medications  PRN medications: alum-mag hydroxide-simeth, polyethylene glycol, traMADol, vancomycin    Review of Systems   All other systems reviewed and are negative.       Objective  Range of Vitals (last 24 hours)  Heart Rate:  [65-71]   Temp:  [36.3 °C (97.3 °F)-36.5 °C (97.7 °F)]   Resp:  [19]   BP: (121-150)/(73-89)   Height:  [160 cm (5' 3\")]   Weight:  [104 kg (229 lb)]   SpO2:  [96 %-99 %]   Daily Weight  04/03/24 : 104 kg (229 lb)    Body mass index is 40.57 kg/m².   Nutritional " consult    Physical Exam  Constitutional:       Appearance: Normal appearance.   HENT:      Head: Normocephalic and atraumatic.      Mouth/Throat:      Mouth: Mucous membranes are moist.      Pharynx: Oropharynx is clear.   Eyes:      Pupils: Pupils are equal, round, and reactive to light.   Cardiovascular:      Rate and Rhythm: Normal rate and regular rhythm.      Heart sounds: Normal heart sounds.   Pulmonary:      Effort: Pulmonary effort is normal.      Breath sounds: Normal breath sounds.   Abdominal:      General: Abdomen is flat. Bowel sounds are normal.      Palpations: Abdomen is soft.   Musculoskeletal:      Cervical back: Normal range of motion.      Comments: Rt heel wound, slough, minimal redness, no purulence   Skin:     Comments: Lt thoracic healed zoster    Neurological:      Mental Status: She is alert.          Relevant Results  Outside Hospital Results  reviewed  Labs  Results from last 72 hours   Lab Units 04/04/24  0725 04/03/24  1708   WBC AUTO x10*3/uL 5.0 7.1   HEMOGLOBIN g/dL 10.0* 10.4*   HEMATOCRIT % 32.2* 32.6*   PLATELETS AUTO x10*3/uL 184 217   NEUTROS PCT AUTO %  --  69.6   LYMPHS PCT AUTO %  --  17.6   MONOS PCT AUTO %  --  9.3   EOS PCT AUTO %  --  2.5     Results from last 72 hours   Lab Units 04/04/24  0725 04/03/24  1708   SODIUM mmol/L 133* 136   POTASSIUM mmol/L 3.8 4.0   CHLORIDE mmol/L 102 103   CO2 mmol/L 25 26   BUN mg/dL 32* 37*   CREATININE mg/dL 2.05* 2.28*   GLUCOSE mg/dL 98 117*   CALCIUM mg/dL 8.9 9.4   ANION GAP mmol/L 10 11   EGFR mL/min/1.73m*2 25* 22*     Results from last 72 hours   Lab Units 04/03/24  1708   ALK PHOS U/L 64   BILIRUBIN TOTAL mg/dL 0.5   PROTEIN TOTAL g/dL 7.3   ALT U/L 11   AST U/L 16   ALBUMIN g/dL 3.3*     Estimated Creatinine Clearance: 26.5 mL/min (A) (by C-G formula based on SCr of 2.05 mg/dL (H)).  C-Reactive Protein   Date Value Ref Range Status   04/03/2024 2.55 (H) <1.00 mg/dL Final     Sedimentation Rate   Date Value Ref Range Status  "  04/03/2024 65 (H) 0 - 30 mm/h Final     No results found for: \"HIV1X2\", \"HIVCONF\", \"RMDVDT5FF\"  No results found for: \"HEPCABINIT\", \"HEPCAB\", \"HCVPCRQUANT\"  Microbiology  Susceptibility data from last 90 days.  Collected Specimen Info Organism Amoxicillin/Clavulanate Ampicillin Ampicillin/Sulbactam Cefazolin Cefepime Ciprofloxacin Gentamicin Nitrofurantoin Piperacillin/Tazobactam Trimethoprim/Sulfamethoxazole   03/14/24 Urine from Clean Catch/Voided Enterobacter cloacae complex R R R R S S S S S R   Imaging  Reviewed       Assessment/Plan   Right heel none healing wound / possible infection  Healing Lt thoracic zoster    Recommendations :  Cefazolin pending the work up  ESR and CRP  MRI of the heel  Keep the leg elevated  Avoid pressure  Wound care    I spent minutes in the professional and overall care of this patient.      Christopher Sharp MD  "

## 2024-04-04 NOTE — CARE PLAN
The patient's goals for the shift include      The clinical goals for the shift include pt will remain free from falls and injury through out shift    Over the shift, the patient did not make progress toward the following goals. Barriers to progression include . Recommendations to address these barriers include .  Problem: Pain  Goal: Walks with improved pain control throughout the shift  Outcome: Progressing     Problem: Fall/Injury  Goal: Not fall by end of shift  Outcome: Progressing

## 2024-04-05 ENCOUNTER — APPOINTMENT (OUTPATIENT)
Dept: WOUND CARE | Facility: HOSPITAL | Age: 79
End: 2024-04-05
Payer: MEDICARE

## 2024-04-05 ENCOUNTER — PHARMACY VISIT (OUTPATIENT)
Dept: PHARMACY | Facility: CLINIC | Age: 79
End: 2024-04-05
Payer: COMMERCIAL

## 2024-04-05 DIAGNOSIS — D50.8 OTHER IRON DEFICIENCY ANEMIA: Primary | ICD-10-CM

## 2024-04-05 LAB
ERYTHROCYTE [DISTWIDTH] IN BLOOD BY AUTOMATED COUNT: 12.6 % (ref 11.5–14.5)
HCT VFR BLD AUTO: 31.2 % (ref 36–46)
HGB BLD-MCNC: 10 G/DL (ref 12–16)
MCH RBC QN AUTO: 30.5 PG (ref 26–34)
MCHC RBC AUTO-ENTMCNC: 32.1 G/DL (ref 32–36)
MCV RBC AUTO: 95 FL (ref 80–100)
NRBC BLD-RTO: 0 /100 WBCS (ref 0–0)
PLATELET # BLD AUTO: 219 X10*3/UL (ref 150–450)
RBC # BLD AUTO: 3.28 X10*6/UL (ref 4–5.2)
VANCOMYCIN SERPL-MCNC: 14.7 UG/ML (ref 5–20)
WBC # BLD AUTO: 4.9 X10*3/UL (ref 4.4–11.3)

## 2024-04-05 PROCEDURE — 1100000001 HC PRIVATE ROOM DAILY

## 2024-04-05 PROCEDURE — 2500000002 HC RX 250 W HCPCS SELF ADMINISTERED DRUGS (ALT 637 FOR MEDICARE OP, ALT 636 FOR OP/ED)

## 2024-04-05 PROCEDURE — 2500000004 HC RX 250 GENERAL PHARMACY W/ HCPCS (ALT 636 FOR OP/ED)

## 2024-04-05 PROCEDURE — 36415 COLL VENOUS BLD VENIPUNCTURE: CPT

## 2024-04-05 PROCEDURE — 80202 ASSAY OF VANCOMYCIN: CPT | Performed by: INTERNAL MEDICINE

## 2024-04-05 PROCEDURE — 2500000001 HC RX 250 WO HCPCS SELF ADMINISTERED DRUGS (ALT 637 FOR MEDICARE OP)

## 2024-04-05 PROCEDURE — 85027 COMPLETE CBC AUTOMATED: CPT

## 2024-04-05 PROCEDURE — 99232 SBSQ HOSP IP/OBS MODERATE 35: CPT

## 2024-04-05 PROCEDURE — RXMED WILLOW AMBULATORY MEDICATION CHARGE

## 2024-04-05 PROCEDURE — 2500000004 HC RX 250 GENERAL PHARMACY W/ HCPCS (ALT 636 FOR OP/ED): Mod: JZ

## 2024-04-05 RX ORDER — CEFAZOLIN SODIUM 1 G/50ML
1 SOLUTION INTRAVENOUS EVERY 12 HOURS
Status: DISCONTINUED | OUTPATIENT
Start: 2024-04-05 | End: 2024-04-10

## 2024-04-05 RX ORDER — FERROUS GLUCONATE 324(38)MG
1 TABLET ORAL 2 TIMES DAILY
Qty: 180 TABLET | Refills: 0 | Status: SHIPPED | OUTPATIENT
Start: 2024-04-05

## 2024-04-05 RX ORDER — CEPHALEXIN 500 MG/1
500 CAPSULE ORAL 3 TIMES DAILY
Qty: 21 CAPSULE | Refills: 0 | Status: SHIPPED | OUTPATIENT
Start: 2024-04-05 | End: 2024-04-11 | Stop reason: HOSPADM

## 2024-04-05 RX ADMIN — OXYBUTYNIN CHLORIDE 5 MG: 5 TABLET ORAL at 20:46

## 2024-04-05 RX ADMIN — HEPARIN SODIUM 7500 UNITS: 5000 INJECTION INTRAVENOUS; SUBCUTANEOUS at 06:05

## 2024-04-05 RX ADMIN — HEPARIN SODIUM 7500 UNITS: 5000 INJECTION INTRAVENOUS; SUBCUTANEOUS at 15:59

## 2024-04-05 RX ADMIN — FUROSEMIDE 40 MG: 40 TABLET ORAL at 10:02

## 2024-04-05 RX ADMIN — HEPARIN SODIUM 7500 UNITS: 5000 INJECTION INTRAVENOUS; SUBCUTANEOUS at 21:00

## 2024-04-05 RX ADMIN — ATORVASTATIN CALCIUM 20 MG: 20 TABLET, FILM COATED ORAL at 20:46

## 2024-04-05 RX ADMIN — CEFAZOLIN SODIUM 1 G: 1 INJECTION, SOLUTION INTRAVENOUS at 10:00

## 2024-04-05 RX ADMIN — FEBUXOSTAT 40 MG: 40 TABLET ORAL at 10:01

## 2024-04-05 RX ADMIN — CEFAZOLIN SODIUM 1 G: 1 INJECTION, SOLUTION INTRAVENOUS at 20:44

## 2024-04-05 RX ADMIN — FERROUS GLUCONATE 324 MG: 324 TABLET ORAL at 10:02

## 2024-04-05 RX ADMIN — FERROUS GLUCONATE 324 MG: 324 TABLET ORAL at 20:46

## 2024-04-05 RX ADMIN — ATENOLOL 100 MG: 50 TABLET ORAL at 20:46

## 2024-04-05 RX ADMIN — LEVOTHYROXINE SODIUM 75 MCG: 75 TABLET ORAL at 06:05

## 2024-04-05 RX ADMIN — CEFEPIME 1 G: 1 INJECTION, POWDER, FOR SOLUTION INTRAMUSCULAR; INTRAVENOUS at 06:04

## 2024-04-05 ASSESSMENT — COGNITIVE AND FUNCTIONAL STATUS - GENERAL
MOBILITY SCORE: 16
PERSONAL GROOMING: A LITTLE
CLIMB 3 TO 5 STEPS WITH RAILING: A LITTLE
CLIMB 3 TO 5 STEPS WITH RAILING: TOTAL
EATING MEALS: A LITTLE
STANDING UP FROM CHAIR USING ARMS: A LITTLE
HELP NEEDED FOR BATHING: A LOT
TOILETING: A LITTLE
HELP NEEDED FOR BATHING: A LOT
TURNING FROM BACK TO SIDE WHILE IN FLAT BAD: A LITTLE
DRESSING REGULAR LOWER BODY CLOTHING: A LOT
PERSONAL GROOMING: A LITTLE
WALKING IN HOSPITAL ROOM: A LITTLE
DRESSING REGULAR UPPER BODY CLOTHING: A LOT
TOILETING: A LITTLE
DAILY ACTIVITIY SCORE: 15
TURNING FROM BACK TO SIDE WHILE IN FLAT BAD: A LITTLE
MOVING FROM LYING ON BACK TO SITTING ON SIDE OF FLAT BED WITH BEDRAILS: A LITTLE
DRESSING REGULAR LOWER BODY CLOTHING: A LOT
DAILY ACTIVITIY SCORE: 15
MOVING TO AND FROM BED TO CHAIR: A LITTLE
STANDING UP FROM CHAIR USING ARMS: A LITTLE
MOVING FROM LYING ON BACK TO SITTING ON SIDE OF FLAT BED WITH BEDRAILS: A LITTLE
MOVING TO AND FROM BED TO CHAIR: A LITTLE
MOBILITY SCORE: 18
WALKING IN HOSPITAL ROOM: A LITTLE
EATING MEALS: A LITTLE
DRESSING REGULAR UPPER BODY CLOTHING: A LOT

## 2024-04-05 ASSESSMENT — PAIN - FUNCTIONAL ASSESSMENT: PAIN_FUNCTIONAL_ASSESSMENT: 0-10

## 2024-04-05 ASSESSMENT — PAIN SCALES - GENERAL
PAINLEVEL_OUTOF10: 0 - NO PAIN
PAINLEVEL_OUTOF10: 0 - NO PAIN

## 2024-04-05 NOTE — PROGRESS NOTES
Vancomycin Dosing by Pharmacy- Cessation of Therapy    Consult to pharmacy for vancomycin dosing has been discontinued by the prescriber, pharmacy will sign off at this time.    Please call pharmacy if there are further questions or re-enter a consult if vancomycin is resumed.     Betty Valenzuela, PharmD

## 2024-04-05 NOTE — PROGRESS NOTES
04/05/24 1110   Discharge Planning   Living Arrangements Spouse/significant other   Support Systems Spouse/significant other   Assistance Needed Patient is A&Ox3, needs assistance from spouse with ADL's, he reports she is unable to bear weight on R foot due to wound, he will stand pivot her to wheelchair, she has a walker as well in the home, split level home with a chair lift, does not drive   Type of Residence Private residence   Number of Stairs to Enter Residence 3   Number of Stairs Within Residence 8   Do you have animals or pets at home? No   Who is requesting discharge planning? Provider   Home or Post Acute Services In home services   Patient expects to be discharged to: TBD pending podiatry plan, will need PT/OT evals   Does the patient need discharge transport arranged? Yes   RoundTrip coordination needed? Yes   Has discharge transport been arranged? No

## 2024-04-05 NOTE — PROGRESS NOTES
Magi Boyd is a 79 y.o. female on day 2 of admission presenting with Failure of outpatient treatment.    Subjective   No acute overnight events. Feels well today without any fever, chills, or significant pain. Plans for debridement of foot wound with podiatry today.       Objective     Last Recorded Vitals  /72   Pulse 69   Temp 36.2 °C (97.2 °F) (Temporal)   Resp 18   Wt 104 kg (229 lb)   SpO2 94%   Intake/Output last 3 Shifts:    Intake/Output Summary (Last 24 hours) at 4/5/2024 1235  Last data filed at 4/5/2024 0634  Gross per 24 hour   Intake 50 ml   Output 1200 ml   Net -1150 ml       Admission Weight  Weight: 104 kg (229 lb) (04/03/24 1610)    Daily Weight  04/03/24 : 104 kg (229 lb)    Image Results  Vascular US lower extremity venous duplex bilateral            [place] [id number]   Tel [phone] and Fax [phone]       Vascular Lab Report  VASC US LOWER EXTREMITY VENOUS DUPLEX BILATERAL       Patient Name:      MAGI BOYD  Reading Physician: [id number] Dayday De La Rosa DO  Study Date:        4/4/2024            Ordering           [id number] BERNIE MCDANIEL                                         Physician:         AMANDA  MRN/PID:           [id number]            Technologist:      Laila Aleman T  Accession#:        [id number]        Technologist 2:  Date of Birth/Age: 1945 / 79      Encounter#:        [id number]                     years  Gender:            F  Admission Status:  Inpatient           Location           [facility]                                         Performed:       Diagnosis/ICD: Localized (leg) edema-R60.0  CPT Codes:     78370 Peripheral venous duplex scan for DVT complete       CONCLUSIONS:  Right Lower Venous: No evidence of acute deep vein thrombus visualized in the right lower extremity. Cannot rule out thrombus in non-visualized Peroneal vein due to edema. Right posterior tibial veins were visualized in  segments due to edema; cannot rule out DVT in non-visualized segments.  Left Lower Venous: No evidence of acute deep vein thrombus visualized in the left lower extremity. Cannot rule out thrombus in non-visualized peroneal vein. Left posterior tibial veins were visualized in segments due to edema; cannot rule out DVT in non-visualized segments.     Imaging & Doppler Findings:     Right                 Compressible Thrombus        Flow  Distal External Iliac     Yes        None   Spontaneous/Phasic  CFV                       Yes        None   Spontaneous/Phasic  PFV                       Yes        None  FV Proximal               Yes        None   Spontaneous/Phasic  FV Mid                    Yes        None  FV Distal                 Yes        None  Popliteal                 Yes        None   Spontaneous/Phasic       Left                  Compress Thrombus        Flow  Distal External Iliac   Yes      None   Spontaneous/Phasic  CFV                     Yes      None   Spontaneous/Phasic  PFV                     Yes      None  FV Proximal             Yes      None   Spontaneous/Phasic  FV Mid                  Yes      None  FV Distal               Yes      None  Popliteal               Yes      None   Spontaneous/Phasic  PTV                     Yes      None       [id number] Dayday De La Rosa DO  Electronically signed by [id number] Dayday De La Rosa DO on 4/4/2024 at 4:20:01 PM       ** Final **  MR foot right wo IV contrast, MR ankle right wo IV contrast  Narrative: Interpreted By:  Imelda David,   STUDY:  MRI of the right lower extremity without IV contrast;      INDICATION:  Signs/Symptoms:concern for OM of R foot; Signs/Symptoms:r/out  osteomyelitis      COMPARISON:  Right foot radiographs dated 04/03/2024      ACCESSION NUMBER(S):  [id number]; [id number]      ORDERING CLINICIAN:  HAKAN BURNETTE      TECHNIQUE:  Multiplanar multisequence MRI of the  right lower extremity was  performed  without IV contrast.       FINDINGS:  Soft tissues: There is a plantar soft tissue ulceration at the level  of heel measuring a proximally 3 x 2 cm in AP and transverse  dimensions respectively. There is diffuse subcutaneous soft tissue  edema throughout the plantar soft tissues of the foot extending  distally to the level of forefoot. This is suggestive of cellulitis.  There is also diffuse circumferential subcutaneous soft tissue edema  extending throughout the included portion of distal leg above the  level of ankle joint and extends distally along the dorsal lateral  aspect of the foot. This is a nonspecific finding and could represent  cellulitis versus venous stasis versus lymphedema. Evaluation for  abscess is limited due to noncontrast technique. Within this  limitation, there is no confluence fluid collection subjacent to the  site of ulceration to definitively suggest an abscess. There is  effacement of normal fat signal in the subcutaneous soft tissues at  the level of ulceration extending deep to the level of the plantar  fascia/posterior calcaneal tuberosity.      There is small volume fluid within the peroneal compartment tendon  sheath suggestive of mild tenosynovitis. The evaluation of tendon  morphology is limited due to mild motion artifacts on axial  sequences. The flexor and extensor compartment tendons appear grossly  unremarkable. There is mild patchy T2 hyperintense signal in the  plantar foot musculature with mild fatty atrophy on T1 weighted  sequences. This is most likely favored to represent diabetic ischemic  myopathy. No evidence of intramuscular fluid collections within  limits of noncontrast technique. There is small plantar calcaneal  enthesophyte with mild thickening of the central band of plantar  fascia suggestive of chronic plantar fasciitis. No abnormal signal is  noted in the plantar fascia within limits of evaluation.      Bones:  There is effacement of normal fat signal in the plantar soft  tissues at  the level of posterior calcaneal tuberosity adjacent to  the site of ulceration (as seen on sagittal image 13/32). There is  very faint bone marrow edema along the plantar cortex of posterior  calcaneal tuberosity subjacent to the level of ulceration. However no  evidence of confluence loss of normal fat signal to suggest  osteomyelitis. There are mild degenerative changes at the midfoot  articulations, more pronounced at 1st through 5th tarsometatarsal  joints with mild subchondral cystic changes. Tibiotalar joint  articulation is maintained and demonstrates mild-to-moderate  degenerative changes with trace tibiotalar joint effusion. Otherwise  rest of the osseous structures appear grossly unremarkable.      Impression: 1. Plantar heel soft tissue ulceration with subjacent cellulitis and  mild reactive osteitis along the plantar cortex of posterior  calcaneal tuberosity as described above. No definite MRI evidence of  osteomyelitis at this point in time. No evidence of abscess within  limits of noncontrast technique.  2. Mild tenosynovitis of the peroneal compartment tendons in the  retro malleolar groove. This is most likely favored to be reactive,  however infectious tenosynovitis can not be completely excluded.  3. Extensive circumferential subcutaneous soft tissue edema in the  visualized distal leg extending along the dorsal lateral aspect of  the foot is nonspecific finding. This could be related to cellulitis  versus venous stasis versus lymphedema. Recommend clinical  correlation.  4. Signal abnormality in the plantar foot musculature is most likely  favored to be related to chronic diabetic ischemic myopathy.  Infectious myositis can also be considered in the differential.      MACRO:  None      Signed by: Imelda David 4/4/2024 1:12 PM  Dictation workstation:   [id number]      Physical Exam  Constitutional:       Appearance: Normal appearance.   HENT:      Head: Normocephalic and atraumatic.    Cardiovascular:      Rate and Rhythm: Normal rate and regular rhythm.   Pulmonary:      Effort: Pulmonary effort is normal.      Breath sounds: Normal breath sounds.   Abdominal:      General: Abdomen is flat.      Palpations: Abdomen is soft.   Musculoskeletal:      Right lower leg: Edema present.      Left lower leg: Edema present.      Comments: Right foot wrapped in ace wrap   Skin:     General: Skin is warm and dry.      Comments: T5 dermatomal crusted over leisons   Neurological:      Mental Status: She is alert and oriented to person, place, and time. Mental status is at baseline.         Relevant Results               Assessment/Plan      Principal Problem:    Failure of outpatient treatment    Magi Boyd is a 79 y.o. female with a PMHx of gout, HTN, chronic venous insufficiency, CKD stage IV, hypothyroidism, overactive bladder, asthma who presented for C/O worsening right heel wound x 2-3 days despite out-pt abx tx associated with mal-odorous, yellowish brown drainage and pain. She was advised by her podiatrist to come to the ED.     Active Issues:   #R heel pressure ulcer stage II vs stage III  #c/f wound infection vs OM  #RLE edema > LLE edema   -iv vancomycin and iv cefepime for presumptive OM   -podiatry consulted to evaluate for debridement  -ID consulted for abx management  -MRI R foot/ankle with plantar heel soft tissue ulceration with subjacent cellulitis and mild reactive osteitis along the plantar cortex of posterior calcaneal tuberosity with no definite MRI evidence of osteomyelitis.   -B/l dvt us negative for dvt  Plan:  -Plan for debridement today  -On cefazolin  -Plan to dc on keflex x7d    #VZV infection  -present in a single dermatome  -vesicular rash has crusted over  -pt already treated with acyclovir 400mg x 5 days. Last dose tonight.  -contact precautions in place     Chronic Issues:   #Gout: C/w febuxostat  #ID: C/w ferrous gluconate  #Asthma: C/w home  inhalers  #Hypothyroidism: Continue with levothyroxine  #HTN: C/w with atenolol  #HLD: c/w atorvastatin  #GERD: c/w Famotidine and maalox  #CVI and leg swelling: C/w furosemide 40 mg [prescribed by Dr. Narayan]  #CKD stage IV: FU with nephro     F: None  E: Replete as needed  N: adult reg diet  G: famotidine  VTE: Heparin     Code:   Full Code     Disposition: 79 y.o.female admitted for R heel pressure ulcer concerning for wound infection vs OM being managed with  cefazolin with podiatry and ID consulted.               Mary Tucker MD

## 2024-04-05 NOTE — PROGRESS NOTES
"Vancomycin Dosing by Pharmacy- FOLLOW UP    Magi Boyd is a 79 y.o. year old female who Pharmacy has been consulted for vancomycin dosing for osteomyelitis/septic arthritis. Based on the patient's indication and renal status this patient is being dosed based on a goal trough/random level of 15-20.     Renal function is currently stable, but poor (CKD).    Current vancomycin dose: 2000 mg x1    Most recent random level: 14.7 mcg/mL    Visit Vitals  /72   Pulse 69   Temp 36.2 °C (97.2 °F) (Temporal)   Resp 18        Lab Results   Component Value Date    CREATININE 2.05 (H) 04/04/2024    CREATININE 2.05 (H) 04/04/2024    CREATININE 2.28 (H) 04/03/2024    CREATININE 2.52 (H) 02/02/2024        Patient weight is No results found for: \"PTWEIGHT\"    No results found for: \"CULTURE\"     I/O last 3 completed shifts:  In: - (0 mL/kg)   Out: 2200 (21.2 mL/kg) [Urine:2200 (0.6 mL/kg/hr)]  Weight: 103.9 kg   [unfilled]    No results found for: \"PATIENTTEMP\"     Assessment/Plan    Below goal random/trough level. T1/2 around 36 hours. Give 1x dose of 1250 mg now. May consider 1250 mg Q48 hours.  The next level will be obtained on 0500 at 4/7. May be obtained sooner if clinically indicated.   Will continue to monitor renal function daily while on vancomycin and order serum creatinine at least every 48 hours if not already ordered.  Follow for continued vancomycin needs, clinical response, and signs/symptoms of toxicity.       Betty Valenzuela, PharmD           "

## 2024-04-05 NOTE — PROGRESS NOTES
Magi Boyd is a 79 y.o. female on day 2 of admission presenting with Failure of outpatient treatment.    Subjective   Interval History: no fever, no new complaints        Review of Systems    Objective   Range of Vitals (last 24 hours)  Heart Rate:  [63-72]   Temp:  [36.2 °C (97.2 °F)-36.3 °C (97.3 °F)]   Resp:  [18]   BP: (111-141)/(72-81)   SpO2:  [94 %-96 %]   Daily Weight  04/03/24 : 104 kg (229 lb)    Body mass index is 40.57 kg/m².    Physical Exam  Constitutional:       Appearance: Normal appearance.   HENT:      Head: Normocephalic and atraumatic.      Mouth/Throat:      Mouth: Mucous membranes are moist.      Pharynx: Oropharynx is clear.   Eyes:      Pupils: Pupils are equal, round, and reactive to light.   Cardiovascular:      Rate and Rhythm: Normal rate and regular rhythm.      Heart sounds: Normal heart sounds.   Pulmonary:      Effort: Pulmonary effort is normal.      Breath sounds: Normal breath sounds.   Abdominal:      General: Abdomen is flat. Bowel sounds are normal.      Palpations: Abdomen is soft.   Musculoskeletal:      Cervical back: Normal range of motion.      Comments: Stasis  Rt heel wound   Neurological:      Mental Status: She is alert.         Antibiotics  cefepime (Maxipime) IV 2 g  vancomycin (Vancocin) 2000 mg/500 ml in D5W 500 mL IV piggyback 2,000 mg  metroNIDAZOLE (Flagyl) 500 mg in NaCl (iso-os) 100 mL  heparin (porcine) injection 7,500 Units  polyethylene glycol (Glycolax, Miralax) packet 17 g  atenolol (Tenormin) tablet 100 mg  atorvastatin (Lipitor) tablet 20 mg  fluticasone furoate-vilanteroL (Breo Ellipta) 100-25 mcg/dose inhaler 1 puff  famotidine-Ca carb-mag hydrox (Pepcid Complete) -165 mg chewable tablet 1 tablet  febuxostat (Uloric) tablet 40 mg  ferrous gluconate (Fergon) 324 (38 Fe) mg tablet 324 mg  furosemide (Lasix) tablet 40 mg  levothyroxine (Synthroid, Levoxyl) tablet 75 mcg  oxybutynin (Ditropan) tablet 5 mg  solifenacin (VESIcare) tablet 5  mg  traMADol (Ultram) tablet  famotidine (Pepcid) tablet 10 mg  alum-mag hydroxide-simeth (Mylanta) 200-200-20 mg/5 mL oral suspension 30 mL  cefepime (Maxipime) 1 g in dextrose 5% 50 mL IV  vancomycin (Vancocin) pharmacy to dose - pharmacy monitoring  acyclovir (Zovirax) capsule 400 mg  traMADol (Ultram) tablet 50 mg  ceFAZolin in dextrose (iso-os) (Ancef) IVPB 1 g      Relevant Results  Labs  Results from last 72 hours   Lab Units 04/05/24  0638 04/04/24  0725 04/03/24  1708   WBC AUTO x10*3/uL 4.9 5.0 7.1   HEMOGLOBIN g/dL 10.0* 10.0* 10.4*   HEMATOCRIT % 31.2* 32.2* 32.6*   PLATELETS AUTO x10*3/uL 219 184 217   NEUTROS PCT AUTO %  --   --  69.6   LYMPHS PCT AUTO %  --   --  17.6   MONOS PCT AUTO %  --   --  9.3   EOS PCT AUTO %  --   --  2.5     Results from last 72 hours   Lab Units 04/04/24  0725 04/03/24  1708   SODIUM mmol/L 136  133* 136   POTASSIUM mmol/L 4.1  3.8 4.0   CHLORIDE mmol/L 102  102 103   CO2 mmol/L 23  25 26   BUN mg/dL 32*  32* 37*   CREATININE mg/dL 2.05*  2.05* 2.28*   GLUCOSE mg/dL 98  98 117*   CALCIUM mg/dL 9.0  8.9 9.4   ANION GAP mmol/L 15  10 11   EGFR mL/min/1.73m*2 25*  25* 22*     Results from last 72 hours   Lab Units 04/04/24  0725 04/03/24  1708   ALK PHOS U/L 53 64   BILIRUBIN TOTAL mg/dL 0.4 0.5   PROTEIN TOTAL g/dL 6.1* 7.3   ALT U/L 9 11   AST U/L 13 16   ALBUMIN g/dL 2.7* 3.3*     Estimated Creatinine Clearance: 26.5 mL/min (A) (by C-G formula based on SCr of 2.05 mg/dL (H)).  C-Reactive Protein   Date Value Ref Range Status   04/03/2024 2.55 (H) <1.00 mg/dL Final     Microbiology  Reviewed  Imaging  Reviewed        Assessment/Plan   Right heel none healing wound / possible infection, the MRI was reviewed, no bony erosions  Healing Lt thoracic zoster     Recommendations :  Continue Cefazolin, plan on a short course of oral antibiotics with discharge  Discussed with the medical team     I spent minutes in the professional and overall care of this  patient.      Christopher Sharp MD

## 2024-04-05 NOTE — PROGRESS NOTES
"Magi Boyd is a 79 y.o. female on day 2 of admission presenting with Failure of outpatient treatment.    Subjective   No new complaints.  MRI negative for osseous involvement.    Meds:  Scheduled medications  atenolol, 100 mg, oral, Daily  atorvastatin, 20 mg, oral, Daily  ceFAZolin, 1 g, intravenous, q12h  famotidine, 10 mg, oral, Every other day  febuxostat, 40 mg, oral, Daily  ferrous gluconate, 324 mg, oral, BID  fluticasone furoate-vilanteroL, 1 puff, inhalation, Daily  furosemide, 40 mg, oral, Daily  heparin (porcine), 7,500 Units, subcutaneous, q8h IMANI  levothyroxine, 75 mcg, oral, Daily  oxybutynin, 5 mg, oral, Nightly      Continuous medications     PRN medications  PRN medications: alum-mag hydroxide-simeth, polyethylene glycol, traMADol       Physical Exam  Constitutional:       Appearance: Normal appearance.   HENT:      Head: Normocephalic and atraumatic.      Mouth/Throat:      Mouth: Mucous membranes are moist.      Pharynx: Oropharynx is clear.   Eyes:      Pupils: Pupils are equal, round, and reactive to light.   Cardiovascular:      Rate and Rhythm: Normal rate and regular rhythm.      Heart sounds: Normal heart sounds.   Pulmonary:      Effort: Pulmonary effort is normal.      Breath sounds: Normal breath sounds.   Abdominal:      General: Abdomen is flat. Bowel sounds are normal.      Palpations: Abdomen is soft.   Musculoskeletal:      Cervical back: Normal range of motion.      Comments: ulceration to right plantar heel measuring  3 x 2 cm;  100% slough ; no malodor or fluctuance;  no probe to bone;  serosanguinous drainage. Palpable pedal pulses/  Skin:     Comments: Lt thoracic healed zoster    Neurological:      Mental Status: She is alert.  Last Recorded Vitals  Blood pressure 117/72, pulse 69, temperature 36.2 °C (97.2 °F), temperature source Temporal, resp. rate 18, height 1.6 m (5' 3\"), weight 104 kg (229 lb), SpO2 94 %.    Intake/Output last 3 Shifts:  I/O last 3 completed " "shifts:  In: - (0 mL/kg)   Out: 2200 (21.2 mL/kg) [Urine:2200 (0.6 mL/kg/hr)]  Weight: 103.9 kg     Relevant Results  Susceptibility data from last 90 days.  Collected Specimen Info Organism Amoxicillin/Clavulanate Ampicillin Ampicillin/Sulbactam Cefazolin Cefepime Ciprofloxacin Gentamicin Nitrofurantoin Piperacillin/Tazobactam Trimethoprim/Sulfamethoxazole   03/14/24 Urine from Clean Catch/Voided Enterobacter cloacae complex R R R R S S S S S R   /80 (BP Location: Right arm, Patient Position: Lying)   Pulse 73   Temp 36.2 °C (97.2 °F) (Temporal)   Resp 16   Ht 1.6 m (5' 3\")   Wt 104 kg (229 lb)   Results for orders placed or performed during the hospital encounter of 04/03/24 (from the past 24 hour(s))   CBC   Result Value Ref Range    WBC 4.9 4.4 - 11.3 x10*3/uL    nRBC 0.0 0.0 - 0.0 /100 WBCs    RBC 3.28 (L) 4.00 - 5.20 x10*6/uL    Hemoglobin 10.0 (L) 12.0 - 16.0 g/dL    Hematocrit 31.2 (L) 36.0 - 46.0 %    MCV 95 80 - 100 fL    MCH 30.5 26.0 - 34.0 pg    MCHC 32.1 32.0 - 36.0 g/dL    RDW 12.6 11.5 - 14.5 %    Platelets 219 150 - 450 x10*3/uL   Vancomycin   Result Value Ref Range    Vancomycin 14.7 5.0 - 20.0 ug/mL   Vascular US lower extremity venous duplex bilateral    Result Date: 4/4/2024          [place] [id number]  Tel [phone] and Fax [phone]  Vascular Lab Report VASC US LOWER EXTREMITY VENOUS DUPLEX BILATERAL  Patient Name:      CHANDNI Khalil Physician: [id number] Dayday De La Rosa DO Study Date:        4/4/2024            Ordering           [id number] BERNIE MCDANIEL                                        Physician:         AMANDA MRN/PID:           [id number]            Technologist:      Laila Aleman RVT Accession#:        [id number]        Technologist 2: Date of Birth/Age: 1945 / 79      Encounter#:        [id number]                    years Gender:            F Admission Status:  Inpatient           Location           [facility]" Hospitals                                        Performed:  Diagnosis/ICD: Localized (leg) edema-R60.0 CPT Codes:     34621 Peripheral venous duplex scan for DVT complete  CONCLUSIONS: Right Lower Venous: No evidence of acute deep vein thrombus visualized in the right lower extremity. Cannot rule out thrombus in non-visualized Peroneal vein due to edema. Right posterior tibial veins were visualized in segments due to edema; cannot rule out DVT in non-visualized segments. Left Lower Venous: No evidence of acute deep vein thrombus visualized in the left lower extremity. Cannot rule out thrombus in non-visualized peroneal vein. Left posterior tibial veins were visualized in segments due to edema; cannot rule out DVT in non-visualized segments.  Imaging & Doppler Findings:  Right                 Compressible Thrombus        Flow Distal External Iliac     Yes        None   Spontaneous/Phasic CFV                       Yes        None   Spontaneous/Phasic PFV                       Yes        None FV Proximal               Yes        None   Spontaneous/Phasic FV Mid                    Yes        None FV Distal                 Yes        None Popliteal                 Yes        None   Spontaneous/Phasic  Left                  Compress Thrombus        Flow Distal External Iliac   Yes      None   Spontaneous/Phasic CFV                     Yes      None   Spontaneous/Phasic PFV                     Yes      None FV Proximal             Yes      None   Spontaneous/Phasic FV Mid                  Yes      None FV Distal               Yes      None Popliteal               Yes      None   Spontaneous/Phasic PTV                     Yes      None  [id number] Dayday De La Rosa DO Electronically signed by [id number] Dayday De La Rosa DO on 4/4/2024 at 4:20:01 PM  ** Final **     MR foot right wo IV contrast    Result Date: 4/4/2024  Interpreted By:  Imelda David, STUDY: MRI of the right lower extremity without IV contrast;   INDICATION:  Signs/Symptoms:concern for OM of R foot; Signs/Symptoms:r/out osteomyelitis   COMPARISON: Right foot radiographs dated 04/03/2024   ACCESSION NUMBER(S): [id number]; [id number]   ORDERING CLINICIAN: HAKAN BURNETTE   TECHNIQUE: Multiplanar multisequence MRI of the  right lower extremity was performed  without IV contrast.   FINDINGS: Soft tissues: There is a plantar soft tissue ulceration at the level of heel measuring a proximally 3 x 2 cm in AP and transverse dimensions respectively. There is diffuse subcutaneous soft tissue edema throughout the plantar soft tissues of the foot extending distally to the level of forefoot. This is suggestive of cellulitis. There is also diffuse circumferential subcutaneous soft tissue edema extending throughout the included portion of distal leg above the level of ankle joint and extends distally along the dorsal lateral aspect of the foot. This is a nonspecific finding and could represent cellulitis versus venous stasis versus lymphedema. Evaluation for abscess is limited due to noncontrast technique. Within this limitation, there is no confluence fluid collection subjacent to the site of ulceration to definitively suggest an abscess. There is effacement of normal fat signal in the subcutaneous soft tissues at the level of ulceration extending deep to the level of the plantar fascia/posterior calcaneal tuberosity.   There is small volume fluid within the peroneal compartment tendon sheath suggestive of mild tenosynovitis. The evaluation of tendon morphology is limited due to mild motion artifacts on axial sequences. The flexor and extensor compartment tendons appear grossly unremarkable. There is mild patchy T2 hyperintense signal in the plantar foot musculature with mild fatty atrophy on T1 weighted sequences. This is most likely favored to represent diabetic ischemic myopathy. No evidence of intramuscular fluid collections within limits of noncontrast  technique. There is small plantar calcaneal enthesophyte with mild thickening of the central band of plantar fascia suggestive of chronic plantar fasciitis. No abnormal signal is noted in the plantar fascia within limits of evaluation.   Bones:  There is effacement of normal fat signal in the plantar soft tissues at the level of posterior calcaneal tuberosity adjacent to the site of ulceration (as seen on sagittal image 13/32). There is very faint bone marrow edema along the plantar cortex of posterior calcaneal tuberosity subjacent to the level of ulceration. However no evidence of confluence loss of normal fat signal to suggest osteomyelitis. There are mild degenerative changes at the midfoot articulations, more pronounced at 1st through 5th tarsometatarsal joints with mild subchondral cystic changes. Tibiotalar joint articulation is maintained and demonstrates mild-to-moderate degenerative changes with trace tibiotalar joint effusion. Otherwise rest of the osseous structures appear grossly unremarkable.       1. Plantar heel soft tissue ulceration with subjacent cellulitis and mild reactive osteitis along the plantar cortex of posterior calcaneal tuberosity as described above. No definite MRI evidence of osteomyelitis at this point in time. No evidence of abscess within limits of noncontrast technique. 2. Mild tenosynovitis of the peroneal compartment tendons in the retro malleolar groove. This is most likely favored to be reactive, however infectious tenosynovitis can not be completely excluded. 3. Extensive circumferential subcutaneous soft tissue edema in the visualized distal leg extending along the dorsal lateral aspect of the foot is nonspecific finding. This could be related to cellulitis versus venous stasis versus lymphedema. Recommend clinical correlation. 4. Signal abnormality in the plantar foot musculature is most likely favored to be related to chronic diabetic ischemic myopathy. Infectious myositis  can also be considered in the differential.   MACRO: None   Signed by: Imelda David 4/4/2024 1:12 PM Dictation workstation:   [id number]    MR ankle right wo IV contrast    Result Date: 4/4/2024  Interpreted By:  Imelda David, STUDY: MRI of the right lower extremity without IV contrast;   INDICATION: Signs/Symptoms:concern for OM of R foot; Signs/Symptoms:r/out osteomyelitis   COMPARISON: Right foot radiographs dated 04/03/2024   ACCESSION NUMBER(S): [id number]; [id number]   ORDERING CLINICIAN: HAKAN BURNETTE   TECHNIQUE: Multiplanar multisequence MRI of the  right lower extremity was performed  without IV contrast.   FINDINGS: Soft tissues: There is a plantar soft tissue ulceration at the level of heel measuring a proximally 3 x 2 cm in AP and transverse dimensions respectively. There is diffuse subcutaneous soft tissue edema throughout the plantar soft tissues of the foot extending distally to the level of forefoot. This is suggestive of cellulitis. There is also diffuse circumferential subcutaneous soft tissue edema extending throughout the included portion of distal leg above the level of ankle joint and extends distally along the dorsal lateral aspect of the foot. This is a nonspecific finding and could represent cellulitis versus venous stasis versus lymphedema. Evaluation for abscess is limited due to noncontrast technique. Within this limitation, there is no confluence fluid collection subjacent to the site of ulceration to definitively suggest an abscess. There is effacement of normal fat signal in the subcutaneous soft tissues at the level of ulceration extending deep to the level of the plantar fascia/posterior calcaneal tuberosity.   There is small volume fluid within the peroneal compartment tendon sheath suggestive of mild tenosynovitis. The evaluation of tendon morphology is limited due to mild motion artifacts on axial sequences. The flexor and extensor compartment  tendons appear grossly unremarkable. There is mild patchy T2 hyperintense signal in the plantar foot musculature with mild fatty atrophy on T1 weighted sequences. This is most likely favored to represent diabetic ischemic myopathy. No evidence of intramuscular fluid collections within limits of noncontrast technique. There is small plantar calcaneal enthesophyte with mild thickening of the central band of plantar fascia suggestive of chronic plantar fasciitis. No abnormal signal is noted in the plantar fascia within limits of evaluation.   Bones:  There is effacement of normal fat signal in the plantar soft tissues at the level of posterior calcaneal tuberosity adjacent to the site of ulceration (as seen on sagittal image 13/32). There is very faint bone marrow edema along the plantar cortex of posterior calcaneal tuberosity subjacent to the level of ulceration. However no evidence of confluence loss of normal fat signal to suggest osteomyelitis. There are mild degenerative changes at the midfoot articulations, more pronounced at 1st through 5th tarsometatarsal joints with mild subchondral cystic changes. Tibiotalar joint articulation is maintained and demonstrates mild-to-moderate degenerative changes with trace tibiotalar joint effusion. Otherwise rest of the osseous structures appear grossly unremarkable.       1. Plantar heel soft tissue ulceration with subjacent cellulitis and mild reactive osteitis along the plantar cortex of posterior calcaneal tuberosity as described above. No definite MRI evidence of osteomyelitis at this point in time. No evidence of abscess within limits of noncontrast technique. 2. Mild tenosynovitis of the peroneal compartment tendons in the retro malleolar groove. This is most likely favored to be reactive, however infectious tenosynovitis can not be completely excluded. 3. Extensive circumferential subcutaneous soft tissue edema in the visualized distal leg extending along the dorsal  lateral aspect of the foot is nonspecific finding. This could be related to cellulitis versus venous stasis versus lymphedema. Recommend clinical correlation. 4. Signal abnormality in the plantar foot musculature is most likely favored to be related to chronic diabetic ischemic myopathy. Infectious myositis can also be considered in the differential.   MACRO: None   Signed by: Imelda David 4/4/2024 1:12 PM Dictation workstation:   [id number]    XR foot right 3+ views    Result Date: 4/3/2024  Interpreted By:  Chris Martinez, STUDY: XR FOOT RIGHT 3+ VIEWS; ;  4/3/2024 5:31 pm   INDICATION: Signs/Symptoms:Right foot wound with right heel failure of outpatient antibiotics.   COMPARISON: 02/14/2024   ACCESSION NUMBER(S): [id number]   ORDERING CLINICIAN: JOSE CARLOS BURRELL   FINDINGS: Right foot, three views   Skin and soft tissue defect at the plantar aspect of the calcaneus. There is no osseous destruction or erosion radiographically. There is diffuse severe osteopenia however. Degenerative changes in the hindfoot.       No radiographic evidence of osteomyelitis. Skin and soft tissue defect underlying the calcaneus. If there is persistent concern MRI can be performed for further evaluation     MACRO: None   Signed by: Chris Martinez 4/3/2024 5:33 PM Dictation workstation:   [id number]        Assessment/Plan   Principal Problem:    Failure of outpatient treatment    Right plantar heel ulceration  MRI without definitive osteomyelitis. Medihoney aquacel, ABD, kerlix and ace bandage.  Keep heel offloaded.  IV antibiotics per ID.  Will do local debridement tomorrow and get C&S.    Will follow.  Will need to get fracture boot for patient. PT/OT evals.    Vanessa Oliva DPM

## 2024-04-06 LAB
ALBUMIN SERPL BCP-MCNC: 2.7 G/DL (ref 3.4–5)
ALP SERPL-CCNC: 54 U/L (ref 33–136)
ALT SERPL W P-5'-P-CCNC: 8 U/L (ref 7–45)
ANION GAP SERPL CALC-SCNC: 12 MMOL/L (ref 10–20)
AST SERPL W P-5'-P-CCNC: 16 U/L (ref 9–39)
BILIRUB SERPL-MCNC: 0.4 MG/DL (ref 0–1.2)
BUN SERPL-MCNC: 27 MG/DL (ref 6–23)
CALCIUM SERPL-MCNC: 9.1 MG/DL (ref 8.6–10.3)
CHLORIDE SERPL-SCNC: 102 MMOL/L (ref 98–107)
CO2 SERPL-SCNC: 27 MMOL/L (ref 21–32)
CREAT SERPL-MCNC: 2.3 MG/DL (ref 0.5–1.05)
EGFRCR SERPLBLD CKD-EPI 2021: 21 ML/MIN/1.73M*2
ERYTHROCYTE [DISTWIDTH] IN BLOOD BY AUTOMATED COUNT: 12.5 % (ref 11.5–14.5)
GLUCOSE SERPL-MCNC: 87 MG/DL (ref 74–99)
HCT VFR BLD AUTO: 33.4 % (ref 36–46)
HGB BLD-MCNC: 10.8 G/DL (ref 12–16)
MCH RBC QN AUTO: 30.2 PG (ref 26–34)
MCHC RBC AUTO-ENTMCNC: 32.3 G/DL (ref 32–36)
MCV RBC AUTO: 93 FL (ref 80–100)
NRBC BLD-RTO: 0 /100 WBCS (ref 0–0)
PLATELET # BLD AUTO: 243 X10*3/UL (ref 150–450)
POTASSIUM SERPL-SCNC: 3.5 MMOL/L (ref 3.5–5.3)
PROT SERPL-MCNC: 6.2 G/DL (ref 6.4–8.2)
RBC # BLD AUTO: 3.58 X10*6/UL (ref 4–5.2)
SODIUM SERPL-SCNC: 137 MMOL/L (ref 136–145)
WBC # BLD AUTO: 4.9 X10*3/UL (ref 4.4–11.3)

## 2024-04-06 PROCEDURE — 36415 COLL VENOUS BLD VENIPUNCTURE: CPT | Performed by: INTERNAL MEDICINE

## 2024-04-06 PROCEDURE — 2500000001 HC RX 250 WO HCPCS SELF ADMINISTERED DRUGS (ALT 637 FOR MEDICARE OP)

## 2024-04-06 PROCEDURE — 97165 OT EVAL LOW COMPLEX 30 MIN: CPT | Mod: GO

## 2024-04-06 PROCEDURE — 97530 THERAPEUTIC ACTIVITIES: CPT | Mod: GO

## 2024-04-06 PROCEDURE — 87070 CULTURE OTHR SPECIMN AEROBIC: CPT | Mod: GEALAB | Performed by: PODIATRIST

## 2024-04-06 PROCEDURE — 1100000001 HC PRIVATE ROOM DAILY

## 2024-04-06 PROCEDURE — 97161 PT EVAL LOW COMPLEX 20 MIN: CPT | Mod: GP

## 2024-04-06 PROCEDURE — 99231 SBSQ HOSP IP/OBS SF/LOW 25: CPT

## 2024-04-06 PROCEDURE — 85027 COMPLETE CBC AUTOMATED: CPT

## 2024-04-06 PROCEDURE — 2500000004 HC RX 250 GENERAL PHARMACY W/ HCPCS (ALT 636 FOR OP/ED): Mod: JZ

## 2024-04-06 PROCEDURE — 0JBQ0ZZ EXCISION OF RIGHT FOOT SUBCUTANEOUS TISSUE AND FASCIA, OPEN APPROACH: ICD-10-PCS | Performed by: PODIATRIST

## 2024-04-06 PROCEDURE — 2500000002 HC RX 250 W HCPCS SELF ADMINISTERED DRUGS (ALT 637 FOR MEDICARE OP, ALT 636 FOR OP/ED)

## 2024-04-06 PROCEDURE — 97530 THERAPEUTIC ACTIVITIES: CPT | Mod: GP

## 2024-04-06 PROCEDURE — 2500000004 HC RX 250 GENERAL PHARMACY W/ HCPCS (ALT 636 FOR OP/ED)

## 2024-04-06 PROCEDURE — 84075 ASSAY ALKALINE PHOSPHATASE: CPT | Performed by: INTERNAL MEDICINE

## 2024-04-06 RX ORDER — ACETAMINOPHEN 325 MG/1
650 TABLET ORAL EVERY 6 HOURS PRN
Status: DISCONTINUED | OUTPATIENT
Start: 2024-04-06 | End: 2024-04-13 | Stop reason: HOSPADM

## 2024-04-06 RX ADMIN — ATORVASTATIN CALCIUM 20 MG: 20 TABLET, FILM COATED ORAL at 20:51

## 2024-04-06 RX ADMIN — ACETAMINOPHEN 650 MG: 325 TABLET ORAL at 13:16

## 2024-04-06 RX ADMIN — LEVOTHYROXINE SODIUM 75 MCG: 75 TABLET ORAL at 05:56

## 2024-04-06 RX ADMIN — FUROSEMIDE 40 MG: 40 TABLET ORAL at 08:45

## 2024-04-06 RX ADMIN — FEBUXOSTAT 40 MG: 40 TABLET ORAL at 08:46

## 2024-04-06 RX ADMIN — CEFAZOLIN SODIUM 1 G: 1 INJECTION, SOLUTION INTRAVENOUS at 08:50

## 2024-04-06 RX ADMIN — FAMOTIDINE 10 MG: 20 TABLET ORAL at 08:46

## 2024-04-06 RX ADMIN — FERROUS GLUCONATE 324 MG: 324 TABLET ORAL at 20:51

## 2024-04-06 RX ADMIN — HEPARIN SODIUM 7500 UNITS: 5000 INJECTION INTRAVENOUS; SUBCUTANEOUS at 05:56

## 2024-04-06 RX ADMIN — OXYBUTYNIN CHLORIDE 5 MG: 5 TABLET ORAL at 20:51

## 2024-04-06 RX ADMIN — CEFAZOLIN SODIUM 1 G: 1 INJECTION, SOLUTION INTRAVENOUS at 20:51

## 2024-04-06 RX ADMIN — HEPARIN SODIUM 7500 UNITS: 5000 INJECTION INTRAVENOUS; SUBCUTANEOUS at 14:06

## 2024-04-06 RX ADMIN — HEPARIN SODIUM 7500 UNITS: 5000 INJECTION INTRAVENOUS; SUBCUTANEOUS at 21:17

## 2024-04-06 RX ADMIN — FERROUS GLUCONATE 324 MG: 324 TABLET ORAL at 08:46

## 2024-04-06 RX ADMIN — ATENOLOL 100 MG: 50 TABLET ORAL at 20:51

## 2024-04-06 ASSESSMENT — COGNITIVE AND FUNCTIONAL STATUS - GENERAL
STANDING UP FROM CHAIR USING ARMS: A LITTLE
DAILY ACTIVITIY SCORE: 15
PERSONAL GROOMING: A LITTLE
DRESSING REGULAR UPPER BODY CLOTHING: A LOT
PERSONAL GROOMING: A LITTLE
DRESSING REGULAR LOWER BODY CLOTHING: A LOT
MOVING FROM LYING ON BACK TO SITTING ON SIDE OF FLAT BED WITH BEDRAILS: A LITTLE
TOILETING: A LOT
MOBILITY SCORE: 16
MOVING TO AND FROM BED TO CHAIR: A LOT
WALKING IN HOSPITAL ROOM: A LOT
STANDING UP FROM CHAIR USING ARMS: A LITTLE
CLIMB 3 TO 5 STEPS WITH RAILING: TOTAL
TURNING FROM BACK TO SIDE WHILE IN FLAT BAD: A LITTLE
CLIMB 3 TO 5 STEPS WITH RAILING: A LOT
HELP NEEDED FOR BATHING: A LITTLE
MOVING TO AND FROM BED TO CHAIR: A LITTLE
DAILY ACTIVITIY SCORE: 19
TURNING FROM BACK TO SIDE WHILE IN FLAT BAD: A LITTLE
PERSONAL GROOMING: A LITTLE
DRESSING REGULAR UPPER BODY CLOTHING: A LITTLE
MOVING FROM LYING ON BACK TO SITTING ON SIDE OF FLAT BED WITH BEDRAILS: A LITTLE
WALKING IN HOSPITAL ROOM: A LITTLE
MOVING TO AND FROM BED TO CHAIR: A LITTLE
TOILETING: A LITTLE
TURNING FROM BACK TO SIDE WHILE IN FLAT BAD: A LITTLE
DAILY ACTIVITIY SCORE: 16
MOBILITY SCORE: 16
EATING MEALS: A LITTLE
MOBILITY SCORE: 13
WALKING IN HOSPITAL ROOM: TOTAL
DRESSING REGULAR UPPER BODY CLOTHING: A LITTLE
HELP NEEDED FOR BATHING: A LOT
HELP NEEDED FOR BATHING: A LOT
TOILETING: A LITTLE
MOVING FROM LYING ON BACK TO SITTING ON SIDE OF FLAT BED WITH BEDRAILS: A LITTLE
DRESSING REGULAR LOWER BODY CLOTHING: A LITTLE
CLIMB 3 TO 5 STEPS WITH RAILING: TOTAL
DRESSING REGULAR LOWER BODY CLOTHING: A LOT
STANDING UP FROM CHAIR USING ARMS: A LITTLE

## 2024-04-06 ASSESSMENT — ACTIVITIES OF DAILY LIVING (ADL)
LACK_OF_TRANSPORTATION: NO
BATHING_ASSISTANCE: MAXIMAL
ADL_ASSISTANCE: NEEDS ASSISTANCE
ADL_ASSISTANCE: NEEDS ASSISTANCE

## 2024-04-06 ASSESSMENT — PAIN - FUNCTIONAL ASSESSMENT: PAIN_FUNCTIONAL_ASSESSMENT: 0-10

## 2024-04-06 ASSESSMENT — PAIN SCALES - GENERAL
PAINLEVEL_OUTOF10: 0 - NO PAIN

## 2024-04-06 NOTE — PROGRESS NOTES
"Magi Boyd is a 79 y.o. female on day 3 of admission presenting with Failure of outpatient treatment.    Subjective   No new complaints.  Still some pain to right heel.  Denies f/c/n/v/sob or chest pain.    Meds:  Scheduled medications  atenolol, 100 mg, oral, Daily  atorvastatin, 20 mg, oral, Daily  ceFAZolin, 1 g, intravenous, q12h  famotidine, 10 mg, oral, Every other day  febuxostat, 40 mg, oral, Daily  ferrous gluconate, 324 mg, oral, BID  fluticasone furoate-vilanteroL, 1 puff, inhalation, Daily  furosemide, 40 mg, oral, Daily  heparin (porcine), 7,500 Units, subcutaneous, q8h IMANI  levothyroxine, 75 mcg, oral, Daily  oxybutynin, 5 mg, oral, Nightly      Continuous medications     PRN medications  PRN medications: alum-mag hydroxide-simeth, polyethylene glycol, traMADol       Physical Exam   Constitutional:       Appearance: Normal appearance.   HENT:      Head: Normocephalic and atraumatic.      Mouth/Throat:      Mouth: Mucous membranes are moist.      Pharynx: Oropharynx is clear.   Eyes:      Pupils: Pupils are equal, round, and reactive to light.   Cardiovascular:      Rate and Rhythm: Normal rate and regular rhythm.      Heart sounds: Normal heart sounds.   Pulmonary:      Effort: Pulmonary effort is normal.      Breath sounds: Normal breath sounds.   Abdominal:      General: Abdomen is flat. Bowel sounds are normal.      Palpations: Abdomen is soft.   Musculoskeletal:      Cervical back: Normal range of motion.      Comments: ulceration to right plantar heel measuring  3 x 2 cm;  100% slough ; no malodor or fluctuance;  no probe to bone;  serosanguinous drainage. Palpable pedal pulses/  Skin:     Comments: Lt thoracic healed zoster    Neurological:      Mental Status: She is alert.    Last Recorded Vitals  Blood pressure 133/72, pulse 68, temperature 36.5 °C (97.7 °F), temperature source Temporal, resp. rate 21, height 1.6 m (5' 3\"), weight 104 kg (229 lb), SpO2 95 %.    Intake/Output last 3 " Shifts:  I/O last 3 completed shifts:  In: 450 (4.3 mL/kg) [P.O.:300; IV Piggyback:150]  Out: 3450 (33.2 mL/kg) [Urine:3450 (0.9 mL/kg/hr)]  Weight: 103.9 kg     Relevant Results   Susceptibility data from last 90 days.  Collected Specimen Info Organism Amoxicillin/Clavulanate Ampicillin Ampicillin/Sulbactam Cefazolin Cefepime Ciprofloxacin Gentamicin Nitrofurantoin Piperacillin/Tazobactam Trimethoprim/Sulfamethoxazole   03/14/24 Urine from Clean Catch/Voided Enterobacter cloacae complex R R R R S S S S S R   Vascular US lower extremity venous duplex bilateral    Result Date: 4/4/2024          [place] [address]  Tel [phone] and Fax [phone]  Vascular Lab Report VASC US LOWER EXTREMITY VENOUS DUPLEX BILATERAL  Patient Name:      CHANDNI Khalil Physician: [id number] Dayday De La Rsoa DO Study Date:        4/4/2024            Ordering           [id number] BERNIE MCDANIEL                                        Physician:         AMANDA MRN/PID:           [id number]            Technologist:      Laila Aleman RVJAYNE Accession#:        [id number]        Technologist 2: Date of Birth/Age: 1945 / 79      Encounter#:        [id number]                    years Gender:            F Admission Status:  Inpatient           Location           [facility]                                        Performed:  Diagnosis/ICD: Localized (leg) edema-R60.0 CPT Codes:     67022 Peripheral venous duplex scan for DVT complete  CONCLUSIONS: Right Lower Venous: No evidence of acute deep vein thrombus visualized in the right lower extremity. Cannot rule out thrombus in non-visualized Peroneal vein due to edema. Right posterior tibial veins were visualized in segments due to edema; cannot rule out DVT in non-visualized segments. Left Lower Venous: No evidence of acute deep vein thrombus visualized in the left lower extremity. Cannot rule out thrombus in non-visualized peroneal vein. Left  posterior tibial veins were visualized in segments due to edema; cannot rule out DVT in non-visualized segments.  Imaging & Doppler Findings:  Right                 Compressible Thrombus        Flow Distal External Iliac     Yes        None   Spontaneous/Phasic CFV                       Yes        None   Spontaneous/Phasic PFV                       Yes        None FV Proximal               Yes        None   Spontaneous/Phasic FV Mid                    Yes        None FV Distal                 Yes        None Popliteal                 Yes        None   Spontaneous/Phasic  Left                  Compress Thrombus        Flow Distal External Iliac   Yes      None   Spontaneous/Phasic CFV                     Yes      None   Spontaneous/Phasic PFV                     Yes      None FV Proximal             Yes      None   Spontaneous/Phasic FV Mid                  Yes      None FV Distal               Yes      None Popliteal               Yes      None   Spontaneous/Phasic PTV                     Yes      None  [id number] Dayday[address]gan [address] Rosa  Electronically signed by [id number] Dayday Rj  on 4/4/2024 at 4:20:01 PM  ** Final **     MR foot right wo IV contrast    Result Date: 4/4/2024  Interpreted By:  Imelda David, STUDY: MRI of the right lower extremity without IV contrast;   INDICATION: Signs/Symptoms:concern for OM of R foot; Signs/Symptoms:r/out osteomyelitis   COMPARISON: Right foot radiographs dated 04/03/2024   ACCESSION NUMBER(S): [id number]; [id number]   ORDERING CLINICIAN: HAKAN BURNETTE   TECHNIQUE: Multiplanar multisequence MRI of the  right lower extremity was performed  without IV contrast.   FINDINGS: Soft tissues: There is a plantar soft tissue ulceration at the level of heel measuring a proximally 3 x 2 cm in AP and transverse dimensions respectively. There is diffuse subcutaneous soft tissue edema throughout the plantar soft tissues of the foot extending distally to the level of forefoot. This  is suggestive of cellulitis. There is also diffuse circumferential subcutaneous soft tissue edema extending throughout the included portion of distal leg above the level of ankle joint and extends distally along the dorsal lateral aspect of the foot. This is a nonspecific finding and could represent cellulitis versus venous stasis versus lymphedema. Evaluation for abscess is limited due to noncontrast technique. Within this limitation, there is no confluence fluid collection subjacent to the site of ulceration to definitively suggest an abscess. There is effacement of normal fat signal in the subcutaneous soft tissues at the level of ulceration extending deep to the level of the plantar fascia/posterior calcaneal tuberosity.   There is small volume fluid within the peroneal compartment tendon sheath suggestive of mild tenosynovitis. The evaluation of tendon morphology is limited due to mild motion artifacts on axial sequences. The flexor and extensor compartment tendons appear grossly unremarkable. There is mild patchy T2 hyperintense signal in the plantar foot musculature with mild fatty atrophy on T1 weighted sequences. This is most likely favored to represent diabetic ischemic myopathy. No evidence of intramuscular fluid collections within limits of noncontrast technique. There is small plantar calcaneal enthesophyte with mild thickening of the central band of plantar fascia suggestive of chronic plantar fasciitis. No abnormal signal is noted in the plantar fascia within limits of evaluation.   Bones:  There is effacement of normal fat signal in the plantar soft tissues at the level of posterior calcaneal tuberosity adjacent to the site of ulceration (as seen on sagittal image 13/32). There is very faint bone marrow edema along the plantar cortex of posterior calcaneal tuberosity subjacent to the level of ulceration. However no evidence of confluence loss of normal fat signal to suggest osteomyelitis. There are  mild degenerative changes at the midfoot articulations, more pronounced at 1st through 5th tarsometatarsal joints with mild subchondral cystic changes. Tibiotalar joint articulation is maintained and demonstrates mild-to-moderate degenerative changes with trace tibiotalar joint effusion. Otherwise rest of the osseous structures appear grossly unremarkable.       1. Plantar heel soft tissue ulceration with subjacent cellulitis and mild reactive osteitis along the plantar cortex of posterior calcaneal tuberosity as described above. No definite MRI evidence of osteomyelitis at this point in time. No evidence of abscess within limits of noncontrast technique. 2. Mild tenosynovitis of the peroneal compartment tendons in the retro malleolar groove. This is most likely favored to be reactive, however infectious tenosynovitis can not be completely excluded. 3. Extensive circumferential subcutaneous soft tissue edema in the visualized distal leg extending along the dorsal lateral aspect of the foot is nonspecific finding. This could be related to cellulitis versus venous stasis versus lymphedema. Recommend clinical correlation. 4. Signal abnormality in the plantar foot musculature is most likely favored to be related to chronic diabetic ischemic myopathy. Infectious myositis can also be considered in the differential.   MACRO: None   Signed by: Imelda David 4/4/2024 1:12 PM Dictation workstation:   [id number]    MR ankle right wo IV contrast    Result Date: 4/4/2024  Interpreted By:  Imelda David, STUDY: MRI of the right lower extremity without IV contrast;   INDICATION: Signs/Symptoms:concern for OM of R foot; Signs/Symptoms:r/out osteomyelitis   COMPARISON: Right foot radiographs dated 04/03/2024   ACCESSION NUMBER(S): [id number]; [id number]   ORDERING CLINICIAN: HAKAN BURNETTE   TECHNIQUE: Multiplanar multisequence MRI of the  right lower extremity was performed  without IV contrast.    FINDINGS: Soft tissues: There is a plantar soft tissue ulceration at the level of heel measuring a proximally 3 x 2 cm in AP and transverse dimensions respectively. There is diffuse subcutaneous soft tissue edema throughout the plantar soft tissues of the foot extending distally to the level of forefoot. This is suggestive of cellulitis. There is also diffuse circumferential subcutaneous soft tissue edema extending throughout the included portion of distal leg above the level of ankle joint and extends distally along the dorsal lateral aspect of the foot. This is a nonspecific finding and could represent cellulitis versus venous stasis versus lymphedema. Evaluation for abscess is limited due to noncontrast technique. Within this limitation, there is no confluence fluid collection subjacent to the site of ulceration to definitively suggest an abscess. There is effacement of normal fat signal in the subcutaneous soft tissues at the level of ulceration extending deep to the level of the plantar fascia/posterior calcaneal tuberosity.   There is small volume fluid within the peroneal compartment tendon sheath suggestive of mild tenosynovitis. The evaluation of tendon morphology is limited due to mild motion artifacts on axial sequences. The flexor and extensor compartment tendons appear grossly unremarkable. There is mild patchy T2 hyperintense signal in the plantar foot musculature with mild fatty atrophy on T1 weighted sequences. This is most likely favored to represent diabetic ischemic myopathy. No evidence of intramuscular fluid collections within limits of noncontrast technique. There is small plantar calcaneal enthesophyte with mild thickening of the central band of plantar fascia suggestive of chronic plantar fasciitis. No abnormal signal is noted in the plantar fascia within limits of evaluation.   Bones:  There is effacement of normal fat signal in the plantar soft tissues at the level of posterior calcaneal  tuberosity adjacent to the site of ulceration (as seen on sagittal image 13/32). There is very faint bone marrow edema along the plantar cortex of posterior calcaneal tuberosity subjacent to the level of ulceration. However no evidence of confluence loss of normal fat signal to suggest osteomyelitis. There are mild degenerative changes at the midfoot articulations, more pronounced at 1st through 5th tarsometatarsal joints with mild subchondral cystic changes. Tibiotalar joint articulation is maintained and demonstrates mild-to-moderate degenerative changes with trace tibiotalar joint effusion. Otherwise rest of the osseous structures appear grossly unremarkable.       1. Plantar heel soft tissue ulceration with subjacent cellulitis and mild reactive osteitis along the plantar cortex of posterior calcaneal tuberosity as described above. No definite MRI evidence of osteomyelitis at this point in time. No evidence of abscess within limits of noncontrast technique. 2. Mild tenosynovitis of the peroneal compartment tendons in the retro malleolar groove. This is most likely favored to be reactive, however infectious tenosynovitis can not be completely excluded. 3. Extensive circumferential subcutaneous soft tissue edema in the visualized distal leg extending along the dorsal lateral aspect of the foot is nonspecific finding. This could be related to cellulitis versus venous stasis versus lymphedema. Recommend clinical correlation. 4. Signal abnormality in the plantar foot musculature is most likely favored to be related to chronic diabetic ischemic myopathy. Infectious myositis can also be considered in the differential.   MACRO: None   Signed by: Imelda David 4/4/2024 1:12 PM Dictation workstation:   [id number]    XR foot right 3+ views    Result Date: 4/3/2024  Interpreted By:  Chris Martinez, STUDY: XR FOOT RIGHT 3+ VIEWS; ;  4/3/2024 5:31 pm   INDICATION: Signs/Symptoms:Right foot wound with right heel failure  of outpatient antibiotics.   COMPARISON: 02/14/2024   ACCESSION NUMBER(S): [id number]   ORDERING CLINICIAN: JOSE CARLOS BURRELL   FINDINGS: Right foot, three views   Skin and soft tissue defect at the plantar aspect of the calcaneus. There is no osseous destruction or erosion radiographically. There is diffuse severe osteopenia however. Degenerative changes in the hindfoot.       No radiographic evidence of osteomyelitis. Skin and soft tissue defect underlying the calcaneus. If there is persistent concern MRI can be performed for further evaluation     MACRO: None   Signed by: Chris Martinez 4/3/2024 5:33 PM Dictation workstation:   [id number]   Results for orders placed or performed during the hospital encounter of 04/03/24 (from the past 24 hour(s))   CBC   Result Value Ref Range    WBC 4.9 4.4 - 11.3 x10*3/uL    nRBC 0.0 0.0 - 0.0 /100 WBCs    RBC 3.58 (L) 4.00 - 5.20 x10*6/uL    Hemoglobin 10.8 (L) 12.0 - 16.0 g/dL    Hematocrit 33.4 (L) 36.0 - 46.0 %    MCV 93 80 - 100 fL    MCH 30.2 26.0 - 34.0 pg    MCHC 32.3 32.0 - 36.0 g/dL    RDW 12.5 11.5 - 14.5 %    Platelets 243 150 - 450 x10*3/uL   Comprehensive metabolic panel   Result Value Ref Range    Glucose 87 74 - 99 mg/dL    Sodium 137 136 - 145 mmol/L    Potassium 3.5 3.5 - 5.3 mmol/L    Chloride 102 98 - 107 mmol/L    Bicarbonate 27 21 - 32 mmol/L    Anion Gap 12 10 - 20 mmol/L    Urea Nitrogen 27 (H) 6 - 23 mg/dL    Creatinine 2.30 (H) 0.50 - 1.05 mg/dL    eGFR 21 (L) >60 mL/min/1.73m*2    Calcium 9.1 8.6 - 10.3 mg/dL    Albumin 2.7 (L) 3.4 - 5.0 g/dL    Alkaline Phosphatase 54 33 - 136 U/L    Total Protein 6.2 (L) 6.4 - 8.2 g/dL    AST 16 9 - 39 U/L    Bilirubin, Total 0.4 0.0 - 1.2 mg/dL    ALT 8 7 - 45 U/L            Assessment/Plan   Principal Problem:    Failure of outpatient treatment    Right plantar heel ulceration  Sharp full thickness excisional debridement of right heel ulceration with forceps and scissors down to and including subcuaneous tissue.   C&S taken.. Medihoney aquacel, ABD, kerlix and ace bandage.  Keep heel offloaded.  IV antibiotics per ID.     Will follow.  Will need to get fracture boot for patient. PT/OT evals.       Vanessa Oliva DPM

## 2024-04-06 NOTE — PROGRESS NOTES
Physical Therapy    Physical Therapy Evaluation & Treatment    Patient Name: Magi Boyd  MRN: [id number]  Today's Date: 4/6/2024   Time Calculation  Start Time: 1253  Stop Time: 1317  Time Calculation (min): 24 min    Assessment/Plan   PT Assessment  PT Assessment Results: Decreased strength, Decreased endurance, Impaired balance, Decreased mobility, Decreased skin integrity, Obesity  Rehab Prognosis: Good  Evaluation/Treatment Tolerance: Patient tolerated treatment well  End of Session Communication: Bedside nurse  Assessment Comment: Pt is s/p right heel debridement 4/6/24 AM and is now able to WBAT with CAM boot. Pt's mobility limited by onset of lightheadedness. Recommend MOD intensity skilled PT services at discharge.  End of Session Patient Position: Bed, 3 rail up, Alarm on   IP OR SWING BED PT PLAN  Inpatient or Swing Bed: Inpatient  PT Plan  Treatment/Interventions: Bed mobility, Transfer training, Gait training, Balance training, Strengthening, Therapeutic activity, Therapeutic exercise, Home exercise program, Orthotic fitting/training  PT Plan: Skilled PT  PT Frequency: 3 times per week  PT Discharge Recommendations: Moderate intensity level of continued care  PT Recommended Transfer Status: Assist x1  PT - OK to Discharge: Yes (per PT POC)      Subjective     General Visit Information:  General  Reason for Referral: 79 year old female admitted for mobility  Past Medical History Relevant to Rehab: Acute UTI (04/20/2023), Adverse reaction to NSAIDs, sequela (04/20/2023), Benign essential hypertension (04/20/2023), Bilateral leg and foot pain (04/20/2023), Body mass index (BMI)40.0-44.9, adult (10/18/2021), CKD (chronic kidney disease) (04/20/2023), Disorder of both eustachian tubes (04/20/2023), Familial hypercholesteremia (04/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), GERD (gastroesophageal reflux disease) (04/20/2023), Lower urinary tract symptoms (04/20/2023), Obesity, unspecified (08/01/2022), Otalgia, left ear (02/18/2020),  Other abnormal glucose (09/29/2016), Other specified abnormal findings of blood chemistry (10/05/2016), Other specified disorders of ear, unspecified ear (03/18/2015), Pelvic and perineal pain (10/24/2019), Personal history of other diseases of the respiratory system (12/05/2013), Personal history of other diseases of the respiratory system (03/18/2015), Personal history of other diseases of the respiratory system (12/17/2019), Personal history of other drug therapy (10/25/2019), Personal history of other endocrine, nutritional and metabolic disease, Personal history of other specified conditions (09/26/2013), Personal history of other specified conditions (10/29/2013), Personal history of other specified conditions (02/18/2020), Personal history of other specified conditions (02/18/2020), Personal history of other specified conditions (02/09/2015), Personal history of urinary (tract) infections (12/18/2018), Persons encountering health services in other specified circumstances (10/25/2019), Right knee pain (04/20/2023), Simple chronic bronchitis (CMS/HCC) (08/23/2023), SOB (shortness of breath) (08/23/2023), Toe pain (08/23/2023), Unspecified abnormal findings in urine (08/25/2021), Urinary tract infection, site not specified (10/18/2021), and Urinary tract infection, site not specified (08/30/2022).  Family/Caregiver Present: Yes  Co-Treatment: OT  Co-Treatment Reason: maxmize pt outcomes  Patient Position Received: Bed, 3 rail up, Alarm on  General Comment: right heel debridement 4/6, WBAT with CAM boot  Home Living:  Home Living  Type of Home: House  Lives With: Significant other  Home Adaptive Equipment: Walker rolling or standard, Cane, Wheelchair-manual (stair lift, show ch)  Home Layout: Two level  Home Access: Stairs to enter with rails  Entrance Stairs-Rails: Both  Bathroom Shower/Tub: Walk-in shower  Bathroom Equipment: Grab bars in shower, Shower chair with back, Grab bars around toilet  Bathroom  Accessibility: 1/2 bath available on 1st floor- pt has been sponge bathing x2mo PTA  Prior Level of Function:  Prior Function Per Pt/Caregiver Report  Level of [place]: Needs assistance with ADLs, Needs assistance with homemaking, Needs assistance with functional transfers  Receives Help From:  (spouse)  ADL Assistance: Needs assistance      Objective     General Assessments:        Static Sitting Balance  Static Sitting-Level of Assistance: Close supervision  Dynamic Sitting Balance  Dynamic Sitting-Comments: Pt completed reaching to floor to don brief and shoe    Static Standing Balance  Static Standing-Level of Assistance: Minimum assistance  Dynamic Standing Balance  Dynamic Standing-Comments: Max due to posterior LOB  Functional Assessments:  Bed Mobility  Bed Mobility: Yes  Bed Mobility 1  Bed Mobility 1: Supine to sitting, Sitting to supine  Bed Mobility Comments 1: SBA to complete sit>sup from elevated HOB, Max for BLE management to complete sit>stand    Transfers  Transfer: Yes  Transfer 1  Transfer From 1: Sit to, Stand to  Transfer Device 1: Walker  Transfer Level of Assistance 1: Minimum assistance  Trials/Comments 1: from elevated EOB    Ambulation/Gait Training  Ambulation/Gait Training Performed: No  Extremity/Trunk Assessments:  RLE   RLE : Within Functional Limits  LLE   LLE : Within Functional Limits  Treatments:     Outcome Measures:  [facility] Basic Mobility  Turning from your back to your side while in a flat bed without using bedrails: A little  Moving from lying on your back to sitting on the side of a flat bed without using bedrails: A little  Moving to and from bed to chair (including a wheelchair): A lot  Standing up from a chair using your arms (e.g. wheelchair or bedside chair): A little  To walk in hospital room: Total  Climbing 3-5 steps with railing: Total  Basic Mobility - Total Score: 13    Encounter Problems       Encounter Problems (Active)       Mobility       STG - Patient  will ambulate 50ft with FWW       Start:  04/06/24    Expected End:  04/20/24            Pt will complete 15 reps of BLE therex to improve strength       Start:  04/06/24    Expected End:  04/20/24               PT Transfers       STG - Patient will perform bed mobility Kelly       Start:  04/06/24    Expected End:  04/20/24            STG - Patient will transfer sit to and from stand with FWW with SBA       Start:  04/06/24    Expected End:  04/20/24               Safety       Pt will independently don/doff CAM boot       Start:  04/06/24    Expected End:  04/20/24                   Education Documentation  Precautions, taught by Brunilda Mccall, PT at 4/6/2024  1:56 PM.  Learner: Significant Other, Patient  Readiness: Acceptance  Method: Explanation  Response: Verbalizes Understanding    Mobility Training, taught by Brunilda Mccall, PT at 4/6/2024  1:56 PM.  Learner: Significant Other, Patient  Readiness: Acceptance  Method: Explanation  Response: Verbalizes Understanding    Education Comments  No comments found.

## 2024-04-06 NOTE — PROGRESS NOTES
Magi Boyd is a 79 y.o. female on day 3 of admission presenting with Failure of outpatient treatment.    Subjective   No acute overnight events. Had debridement this AM. States that foot pain is manageable but has a headache. Denies any fever, chills or abdominal pain.        Objective     Last Recorded Vitals  /72   Pulse 68   Temp 36.5 °C (97.7 °F) (Temporal)   Resp 21   Wt 104 kg (229 lb)   SpO2 95%   Intake/Output last 3 Shifts:    Intake/Output Summary (Last 24 hours) at 4/6/2024 1323  Last data filed at 4/6/2024 0627  Gross per 24 hour   Intake 50 ml   Output 1250 ml   Net -1200 ml         Admission Weight  Weight: 104 kg (229 lb) (04/03/24 1610)    Daily Weight  04/03/24 : 104 kg (229 lb)    Image Results  Vascular US lower extremity venous duplex bilateral            [place] [address]   Tel [phone] and Fax [phone]       Vascular Lab Report  [facility] US LOWER EXTREMITY VENOUS DUPLEX BILATERAL       Patient Name:      MAGI BOYD  Reading Physician: [id number] Dayday De La Rosa DO  Study Date:        4/4/2024            Ordering           [id number] BERNIE MCDANIEL                                         Physician:         AMANDA  MRN/PID:           [id number]            Technologist:      Laila Aleman T  Accession#:        [id number]        Technologist 2:  Date of Birth/Age: 1945 / 79      Encounter#:        [id number]                     years  Gender:            F  Admission Status:  Inpatient           Location           [facility]                                         Performed:       Diagnosis/ICD: Localized (leg) edema-R60.0  CPT Codes:     85704 Peripheral venous duplex scan for DVT complete       CONCLUSIONS:  Right Lower Venous: No evidence of acute deep vein thrombus visualized in the right lower extremity. Cannot rule out thrombus in non-visualized Peroneal vein due to edema. Right posterior tibial veins were visualized  in segments due to edema; cannot rule out DVT in non-visualized segments.  Left Lower Venous: No evidence of acute deep vein thrombus visualized in the left lower extremity. Cannot rule out thrombus in non-visualized peroneal vein. Left posterior tibial veins were visualized in segments due to edema; cannot rule out DVT in non-visualized segments.     Imaging & Doppler Findings:     Right                 Compressible Thrombus        Flow  Distal External Iliac     Yes        None   Spontaneous/Phasic  CFV                       Yes        None   Spontaneous/Phasic  PFV                       Yes        None  FV Proximal               Yes        None   Spontaneous/Phasic  FV Mid                    Yes        None  FV Distal                 Yes        None  Popliteal                 Yes        None   Spontaneous/Phasic       Left                  Compress Thrombus        Flow  Distal External Iliac   Yes      None   Spontaneous/Phasic  CFV                     Yes      None   Spontaneous/Phasic  PFV                     Yes      None  FV Proximal             Yes      None   Spontaneous/Phasic  FV Mid                  Yes      None  FV Distal               Yes      None  Popliteal               Yes      None   Spontaneous/Phasic  PTV                     Yes      None       [id number] Dayday De La Rosa DO  Electronically signed by [id number] Dayday De La Rosa DO on 4/4/2024 at 4:20:01 PM       ** Final **  MR foot right wo IV contrast, MR ankle right wo IV contrast  Narrative: Interpreted By:  Imelda David,   STUDY:  MRI of the right lower extremity without IV contrast;      INDICATION:  Signs/Symptoms:concern for OM of R foot; Signs/Symptoms:r/out  osteomyelitis      COMPARISON:  Right foot radiographs dated 04/03/2024      ACCESSION NUMBER(S):  [id number]; [id number]      ORDERING CLINICIAN:  HAKAN BURNETTE      TECHNIQUE:  Multiplanar multisequence MRI of the  right lower extremity was  performed  without IV  contrast.      FINDINGS:  Soft tissues: There is a plantar soft tissue ulceration at the level  of heel measuring a proximally 3 x 2 cm in AP and transverse  dimensions respectively. There is diffuse subcutaneous soft tissue  edema throughout the plantar soft tissues of the foot extending  distally to the level of forefoot. This is suggestive of cellulitis.  There is also diffuse circumferential subcutaneous soft tissue edema  extending throughout the included portion of distal leg above the  level of ankle joint and extends distally along the dorsal lateral  aspect of the foot. This is a nonspecific finding and could represent  cellulitis versus venous stasis versus lymphedema. Evaluation for  abscess is limited due to noncontrast technique. Within this  limitation, there is no confluence fluid collection subjacent to the  site of ulceration to definitively suggest an abscess. There is  effacement of normal fat signal in the subcutaneous soft tissues at  the level of ulceration extending deep to the level of the plantar  fascia/posterior calcaneal tuberosity.      There is small volume fluid within the peroneal compartment tendon  sheath suggestive of mild tenosynovitis. The evaluation of tendon  morphology is limited due to mild motion artifacts on axial  sequences. The flexor and extensor compartment tendons appear grossly  unremarkable. There is mild patchy T2 hyperintense signal in the  plantar foot musculature with mild fatty atrophy on T1 weighted  sequences. This is most likely favored to represent diabetic ischemic  myopathy. No evidence of intramuscular fluid collections within  limits of noncontrast technique. There is small plantar calcaneal  enthesophyte with mild thickening of the central band of plantar  fascia suggestive of chronic plantar fasciitis. No abnormal signal is  noted in the plantar fascia within limits of evaluation.      Bones:  There is effacement of normal fat signal in the plantar  soft  tissues at the level of posterior calcaneal tuberosity adjacent to  the site of ulceration (as seen on sagittal image 13/32). There is  very faint bone marrow edema along the plantar cortex of posterior  calcaneal tuberosity subjacent to the level of ulceration. However no  evidence of confluence loss of normal fat signal to suggest  osteomyelitis. There are mild degenerative changes at the midfoot  articulations, more pronounced at 1st through 5th tarsometatarsal  joints with mild subchondral cystic changes. Tibiotalar joint  articulation is maintained and demonstrates mild-to-moderate  degenerative changes with trace tibiotalar joint effusion. Otherwise  rest of the osseous structures appear grossly unremarkable.      Impression: 1. Plantar heel soft tissue ulceration with subjacent cellulitis and  mild reactive osteitis along the plantar cortex of posterior  calcaneal tuberosity as described above. No definite MRI evidence of  osteomyelitis at this point in time. No evidence of abscess within  limits of noncontrast technique.  2. Mild tenosynovitis of the peroneal compartment tendons in the  retro malleolar groove. This is most likely favored to be reactive,  however infectious tenosynovitis can not be completely excluded.  3. Extensive circumferential subcutaneous soft tissue edema in the  visualized distal leg extending along the dorsal lateral aspect of  the foot is nonspecific finding. This could be related to cellulitis  versus venous stasis versus lymphedema. Recommend clinical  correlation.  4. Signal abnormality in the plantar foot musculature is most likely  favored to be related to chronic diabetic ischemic myopathy.  Infectious myositis can also be considered in the differential.      MACRO:  None      Signed by: Imelda David 4/4/2024 1:12 PM  Dictation workstation:   [id number]      Physical Exam  Constitutional:       Appearance: Normal appearance.   HENT:      Head: Normocephalic and  atraumatic.   Cardiovascular:      Rate and Rhythm: Normal rate and regular rhythm.   Pulmonary:      Effort: Pulmonary effort is normal.      Breath sounds: Normal breath sounds.   Abdominal:      General: Abdomen is flat.      Palpations: Abdomen is soft.   Musculoskeletal:      Right lower leg: Edema present.      Left lower leg: Edema present.      Comments: Right foot wrapped in ace wrap   Skin:     General: Skin is warm and dry.      Comments: T5 dermatomal crusted over leisons   Neurological:      Mental Status: She is alert and oriented to person, place, and time. Mental status is at baseline.         Relevant Results               Assessment/Plan      Principal Problem:    Failure of outpatient treatment    Magi Boyd is a 79 y.o. female with a PMHx of gout, HTN, chronic venous insufficiency, CKD stage IV, hypothyroidism, overactive bladder, asthma who presented for C/O worsening right heel wound x 2-3 days despite out-pt abx tx associated with mal-odorous, yellowish brown drainage and pain. She was advised by her podiatrist to come to the ED.     Active Issues:   #R heel pressure ulcer stage II vs stage III s/p debridement 4/6  #R heel cellulitis  #RLE edema > LLE edema   -iv vancomycin and iv cefepime for presumptive OM   -podiatry consulted to evaluate for debridement  -ID consulted for abx management  -MRI R foot/ankle with plantar heel soft tissue ulceration with subjacent cellulitis and mild reactive osteitis along the plantar cortex of posterior calcaneal tuberosity with no definite MRI evidence of osteomyelitis.   -B/l dvt us negative for dvt  -S/p debridement  Plan:  -On cefazolin  -Plan to dc on keflex x7d  -PT/OT rec snf    #VZV infection  -present in a single dermatome  -vesicular rash has crusted over  -pt already treated with acyclovir 400mg x 5 days. Last dose tonight.  -contact precautions in place     Chronic Issues:   #Gout: C/w febuxostat  #ID: C/w ferrous gluconate  #Asthma: C/w  home inhalers  #Hypothyroidism: Continue with levothyroxine  #HTN: C/w with atenolol  #HLD: c/w atorvastatin  #GERD: c/w Famotidine and maalox  #CVI and leg swelling: C/w furosemide 40 mg [prescribed by Dr. Narayan]  #CKD stage IV: FU with nephro     F: None  E: Replete as needed  N: adult reg diet  G: famotidine  VTE: Heparin     Code:   Full Code     Disposition: 79 y.o.female admitted for R heel pressure ulcer concerning for wound infection vs OM being managed with cefazolin with podiatry and ID consulted. Pending SNF placement              Mary Tucker MD

## 2024-04-06 NOTE — PROGRESS NOTES
Magi Boyd is a 79 y.o. female on day 3 of admission presenting with Failure of outpatient treatment.    Subjective   Interval History: no fever, no new complaints        Review of Systems    Objective   Range of Vitals (last 24 hours)  Heart Rate:  [68-76]   Temp:  [36.2 °C (97.2 °F)-36.7 °C (98.1 °F)]   Resp:  [16-21]   BP: (124-134)/(72-80)   SpO2:  [95 %-97 %]   Daily Weight  04/03/24 : 104 kg (229 lb)    Body mass index is 40.57 kg/m².    Physical Exam  Constitutional:       Appearance: Normal appearance.   HENT:      Head: Normocephalic and atraumatic.      Mouth/Throat:      Mouth: Mucous membranes are moist.      Pharynx: Oropharynx is clear.   Eyes:      Pupils: Pupils are equal, round, and reactive to light.   Cardiovascular:      Rate and Rhythm: Normal rate and regular rhythm.      Heart sounds: Normal heart sounds.   Pulmonary:      Effort: Pulmonary effort is normal.      Breath sounds: Normal breath sounds.   Abdominal:      General: Abdomen is flat. Bowel sounds are normal.      Palpations: Abdomen is soft.   Musculoskeletal:      Cervical back: Normal range of motion.      Comments: Stasis  Rt heel wound   Neurological:      Mental Status: She is alert.         Antibiotics  cefepime (Maxipime) IV 2 g  vancomycin (Vancocin) 2000 mg/500 ml in D5W 500 mL IV piggyback 2,000 mg  metroNIDAZOLE (Flagyl) 500 mg in NaCl (iso-os) 100 mL  heparin (porcine) injection 7,500 Units  polyethylene glycol (Glycolax, Miralax) packet 17 g  atenolol (Tenormin) tablet 100 mg  atorvastatin (Lipitor) tablet 20 mg  fluticasone furoate-vilanteroL (Breo Ellipta) 100-25 mcg/dose inhaler 1 puff  famotidine-Ca carb-mag hydrox (Pepcid Complete) -165 mg chewable tablet 1 tablet  febuxostat (Uloric) tablet 40 mg  ferrous gluconate (Fergon) 324 (38 Fe) mg tablet 324 mg  furosemide (Lasix) tablet 40 mg  levothyroxine (Synthroid, Levoxyl) tablet 75 mcg  oxybutynin (Ditropan) tablet 5 mg  solifenacin (VESIcare) tablet 5  mg  traMADol (Ultram) tablet  famotidine (Pepcid) tablet 10 mg  alum-mag hydroxide-simeth (Mylanta) 200-200-20 mg/5 mL oral suspension 30 mL  cefepime (Maxipime) 1 g in dextrose 5% 50 mL IV  vancomycin (Vancocin) pharmacy to dose - pharmacy monitoring  acyclovir (Zovirax) capsule 400 mg  traMADol (Ultram) tablet 50 mg  ceFAZolin in dextrose (iso-os) (Ancef) IVPB 1 g      Relevant Results  Labs  Results from last 72 hours   Lab Units 04/06/24  0642 04/05/24  0638 04/04/24  0725 04/03/24  1708   WBC AUTO x10*3/uL 4.9 4.9 5.0 7.1   HEMOGLOBIN g/dL 10.8* 10.0* 10.0* 10.4*   HEMATOCRIT % 33.4* 31.2* 32.2* 32.6*   PLATELETS AUTO x10*3/uL 243 219 184 217   NEUTROS PCT AUTO %  --   --   --  69.6   LYMPHS PCT AUTO %  --   --   --  17.6   MONOS PCT AUTO %  --   --   --  9.3   EOS PCT AUTO %  --   --   --  2.5       Results from last 72 hours   Lab Units 04/06/24  0642 04/04/24  0725 04/03/24  1708   SODIUM mmol/L 137 136  133* 136   POTASSIUM mmol/L 3.5 4.1  3.8 4.0   CHLORIDE mmol/L 102 102  102 103   CO2 mmol/L 27 23  25 26   BUN mg/dL 27* 32*  32* 37*   CREATININE mg/dL 2.30* 2.05*  2.05* 2.28*   GLUCOSE mg/dL 87 98  98 117*   CALCIUM mg/dL 9.1 9.0  8.9 9.4   ANION GAP mmol/L 12 15  10 11   EGFR mL/min/1.73m*2 21* 25*  25* 22*       Results from last 72 hours   Lab Units 04/06/24  0642 04/04/24  0725 04/03/24  1708   ALK PHOS U/L 54 53 64   BILIRUBIN TOTAL mg/dL 0.4 0.4 0.5   PROTEIN TOTAL g/dL 6.2* 6.1* 7.3   ALT U/L 8 9 11   AST U/L 16 13 16   ALBUMIN g/dL 2.7* 2.7* 3.3*       Estimated Creatinine Clearance: 23.6 mL/min (A) (by C-G formula based on SCr of 2.3 mg/dL (H)).  C-Reactive Protein   Date Value Ref Range Status   04/03/2024 2.55 (H) <1.00 mg/dL Final     Microbiology  Reviewed  Imaging  Reviewed        Assessment/Plan   Right heel none healing wound / possible infection, the MRI was reviewed, no bony erosions sp bedside debridement   Healing Lt thoracic zoster     Recommendations :  Continue Cefazolin,  plan on a short course of oral antibiotics with discharge  Discussed with the medical team     I spent minutes in the professional and overall care of this patient.      Christopher Sharp MD

## 2024-04-06 NOTE — CARE PLAN
Problem: Skin  Goal: Decreased wound size/increased tissue granulation at next dressing change  Outcome: Not Progressing  Flowsheets (Taken 4/5/2024 2206)  Decreased wound size/increased tissue granulation at next dressing change: Promote sleep for wound healing      The clinical goals for the shift include Patient will utilize the call light when needing assistance to maintain safety        Problem: Skin  Goal: Decreased wound size/increased tissue granulation at next dressing change  Outcome: Not Progressing  Flowsheets (Taken 4/5/2024 2206)  Decreased wound size/increased tissue granulation at next dressing change: Promote sleep for wound healing

## 2024-04-06 NOTE — CARE PLAN
The patient's goals for the shift include  getting adequate rest.     The clinical goals for the shift include pt will remain free from pain r/t R heel wound by end of shift.

## 2024-04-06 NOTE — PROGRESS NOTES
Occupational Therapy    Evaluation    Patient Name: Magi Boyd  MRN: [id number]  Today's Date: 4/6/2024  Time Calculation  Start Time: 1252  Stop Time: 1317  Time Calculation (min): 25 min    Assessment  IP OT Assessment  OT Assessment: Pt presents with decreased endurance, decreased ADL performance, decreased functional mobility. Continued skilled OT recommended to maximize pt safety and independence.  Prognosis: Good  Barriers to Discharge: None  Evaluation/Treatment Tolerance: Patient limited by fatigue  Medical Staff Made Aware: Yes  End of Session Communication: Bedside nurse  End of Session Patient Position: Bed, 3 rail up, Alarm on  Plan:  Treatment Interventions: ADL retraining, UE strengthening/ROM, Functional transfer training, Endurance training, Compensatory technique education  OT Frequency: 3 times per week  OT Discharge Recommendations: Moderate intensity level of continued care  OT Recommended Transfer Status: Assist of 1  OT - OK to Discharge: Yes (per OT POC)    Subjective   Current Problem:  1. Failure of outpatient treatment        2. Ulcer of right foot, unspecified ulcer stage (CMS/HCC)  Vascular US lower extremity venous duplex bilateral    Vascular US lower extremity venous duplex bilateral    cephalexin (Keflex) 500 mg capsule      3. Cellulitis of other specified site  Vascular US lower extremity venous duplex bilateral    Vascular US lower extremity venous duplex bilateral    cephalexin (Keflex) 500 mg capsule      4. Edema, unspecified type  Vascular US lower extremity venous duplex bilateral    Vascular US lower extremity venous duplex bilateral      5. Lower extremity edema  Vascular US lower extremity venous duplex bilateral    Vascular US lower extremity venous duplex bilateral        General:  General  Reason for Referral: 79 year old female referred to OT for R foot sx, impaired mobility, impaired ADL  Referred By: Mary Tucker MD  Past Medical History Relevant to Rehab:  Acute UTI (04/20/2023), Adverse reaction to NSAIDs, sequela (04/20/2023), Benign essential hypertension (04/20/2023), Bilateral leg and foot pain (04/20/2023), Body mass index (BMI)40.0-44.9, adult (10/18/2021), CKD (chronic kidney disease) (04/20/2023), Disorder of both eustachian tubes (04/20/2023), Familial hypercholesteremia (04/20/2023), GERD (gastroesophageal reflux disease) (04/20/2023), Lower urinary tract symptoms (04/20/2023), Obesity, unspecified (08/01/2022), Otalgia, left ear (02/18/2020), Other abnormal glucose (09/29/2016), Other specified abnormal findings of blood chemistry (10/05/2016), Other specified disorders of ear, unspecified ear (03/18/2015), Pelvic and perineal pain (10/24/2019), Personal history of other diseases of the respiratory system (12/05/2013), Personal history of other diseases of the respiratory system (03/18/2015), Personal history of other diseases of the respiratory system (12/17/2019), Personal history of other drug therapy (10/25/2019), Personal history of other endocrine, nutritional and metabolic disease, Personal history of other specified conditions (09/26/2013), Personal history of other specified conditions (10/29/2013), Personal history of other specified conditions (02/18/2020), Personal history of other specified conditions (02/18/2020), Personal history of other specified conditions (02/09/2015), Personal history of urinary (tract) infections (12/18/2018), Persons encountering health services in other specified circumstances (10/25/2019), Right knee pain (04/20/2023), Simple chronic bronchitis (CMS/HCC) (08/23/2023), SOB (shortness of breath) (08/23/2023), Toe pain (08/23/2023), Unspecified abnormal findings in urine (08/25/2021), Urinary tract infection, site not specified (10/18/2021), and Urinary tract infection, site not specified (08/30/2022).  Family/Caregiver Present: Yes  Caregiver Feedback: spouse present, able to discuss POC and PLOF  Co-Treatment:  PT  Co-Treatment Reason: maxmize pt outcomes  Prior to Session Communication: Bedside nurse  Patient Position Received: Bed, 3 rail up, Alarm on  General Comment: Pt pleasant, cooperative with therapy christine. Joeyck in place. Pt with RLE walking boot for WBing.  Precautions:  LE Weight Bearing Status: Weight Bearing as Tolerated (RLE WBAT with walking boot only)  Medical Precautions: Fall precautions     Pain:  Pain Assessment  Pain Assessment: 0-10  Pain Score: 0 - No pain    Objective   Cognition:  Overall Cognitive Status: Within Functional Limits  Orientation Level: Oriented X4     Home Living:  Type of Home: House  Lives With: Significant other  Home Adaptive Equipment: Walker rolling or standard, Cane, Wheelchair-manual (stair lift)  Home Layout: Two level  Home Access: Stairs to enter with rails  Entrance Stairs-Rails: Both  Entrance Stairs-Number of Steps: 6  Bathroom Shower/Tub: Walk-in shower  Bathroom Toilet: Standard  Bathroom Equipment: Grab bars in shower, Shower chair with back, Grab bars around toilet  Bathroom Accessibility: 1/2 bath available on 1st floor- pt has been sponge bathing x2mo PTA   Prior Function:  Level of [place]: Needs assistance with ADLs, Needs assistance with homemaking, Needs assistance with functional transfers  ADL Assistance: Needs assistance  Homemaking Assistance: Needs assistance  Ambulatory Assistance: Needs assistance  Prior Function Comments: Pt spongebaths PTA. Requires spouse help for stairs, ambulation. (-) driving    ADL:  Eating Assistance: Independent  Grooming Assistance: Stand by  Bathing Assistance: Maximal  UE Dressing Assistance: Independent  LE Dressing Assistance: Maximal  Toileting Assistance with Device: Maximal  Functional Assistance: Moderate  ADL Comments: Pt able to don/tie shoe with forward flexion. Other ADL performance anticipated d/t current clinical presentation.  Activity Tolerance:  Endurance: Tolerates less than 10 min exercise, no  significant change in vital signs  Bed Mobility/Transfers: Bed Mobility  Bed Mobility: Yes  Bed Mobility 1  Bed Mobility 1: Supine to sitting, Sitting to supine  Level of Assistance 1: Contact guard  Bed Mobility Comments 1: SBA to complete sit>sup from elevated HOB, Max for BLE management to complete sit>stand    Transfers  Transfer: Yes  Transfer 1  Transfer From 1: Sit to, Stand to  Transfer to 1: Sit, Stand  Technique 1: Sit to stand, Stand to sit  Transfer Device 1: Walker  Transfer Level of Assistance 1: Minimum assistance  Trials/Comments 1: EOB elevated    Sitting Balance:  Static Sitting Balance  Static Sitting-Balance Support: Feet supported  Static Sitting-Level of Assistance: Close supervision  Standing Balance:  Static Standing Balance  Static Standing-Balance Support: Bilateral upper extremity supported  Static Standing-Level of Assistance: Minimum assistance  Static Standing-Comment/Number of Minutes: Able to tolerate ~1 minute before feeling lightheaded, requiring returning to supine    Strength:  Strength Comments: JOSIE ramirez 4/5    Hand Function:  Hand Function  Gross Grasp: Functional  Coordination: Functional  Extremities: RUE   RUE : Within Functional Limits and LUE   LUE: Within Functional Limits    Outcome Measures: [facility] Daily Activity  Putting on and taking off regular lower body clothing: A lot  Bathing (including washing, rinsing, drying): A lot  Putting on and taking off regular upper body clothing: A little  Toileting, which includes using toilet, bedpan or urinal: A lot  Taking care of personal grooming such as brushing teeth: A little  Eating Meals: None  Daily Activity - Total Score: 16      Education Documentation  Body Mechanics, taught by Miki Mcdonald OT at 4/6/2024  2:43 PM.  Learner: Patient  Readiness: Acceptance  Method: Explanation  Response: Verbalizes Understanding    Precautions, taught by Miki Mcdonald OT at 4/6/2024  2:43 PM.  Learner: Patient  Readiness:  Acceptance  Method: Explanation  Response: Verbalizes Understanding    ADL Training, taught by Miki Mcdonald OT at 4/6/2024  2:43 PM.  Learner: Patient  Readiness: Acceptance  Method: Explanation  Response: Verbalizes Understanding    Education Comments  No comments found.      Goals:   Encounter Problems       Encounter Problems (Active)       OT Goals       Pt will tolerate 10min stand during functional task completion with no more than 1 rest break in order to increase endurance for functional task completion.  (Progressing)       Start:  04/06/24    Expected End:  04/20/24            Pt will increase endurance to tolerate 15min of OOB activity with no more than 1 rest break in order to increase ability to engage in ADL completion.  (Progressing)       Start:  04/06/24    Expected End:  04/20/24            Pt will complete [id number] transfers using LRD in preparation for ADLs with supervision  (Progressing)       Start:  04/06/24    Expected End:  04/20/24            Pt will demo LE ADL completion with modified independence, using AE if needed.  (Progressing)       Start:  04/06/24    Expected End:  04/20/24            Pt will demo and/or verbalize 2-3 energy conservation techniques to incorporate into functional mobility or ADL to improve performance and increase independence.  (Progressing)       Start:  04/06/24    Expected End:  04/20/24

## 2024-04-06 NOTE — CARE PLAN
The clinical goals for the shift include pt will remain free from falls and injury through out shift

## 2024-04-07 LAB
ALBUMIN SERPL BCP-MCNC: 2.8 G/DL (ref 3.4–5)
ALP SERPL-CCNC: 55 U/L (ref 33–136)
ALT SERPL W P-5'-P-CCNC: 9 U/L (ref 7–45)
ANION GAP SERPL CALC-SCNC: 12 MMOL/L (ref 10–20)
AST SERPL W P-5'-P-CCNC: 34 U/L (ref 9–39)
BILIRUB SERPL-MCNC: 0.3 MG/DL (ref 0–1.2)
BUN SERPL-MCNC: 25 MG/DL (ref 6–23)
CALCIUM SERPL-MCNC: 9 MG/DL (ref 8.6–10.3)
CHLORIDE SERPL-SCNC: 101 MMOL/L (ref 98–107)
CO2 SERPL-SCNC: 28 MMOL/L (ref 21–32)
CREAT SERPL-MCNC: 2.43 MG/DL (ref 0.5–1.05)
EGFRCR SERPLBLD CKD-EPI 2021: 20 ML/MIN/1.73M*2
ERYTHROCYTE [DISTWIDTH] IN BLOOD BY AUTOMATED COUNT: 12.6 % (ref 11.5–14.5)
GLUCOSE SERPL-MCNC: 88 MG/DL (ref 74–99)
HCT VFR BLD AUTO: 34.4 % (ref 36–46)
HGB BLD-MCNC: 11.1 G/DL (ref 12–16)
MCH RBC QN AUTO: 30.4 PG (ref 26–34)
MCHC RBC AUTO-ENTMCNC: 32.3 G/DL (ref 32–36)
MCV RBC AUTO: 94 FL (ref 80–100)
NRBC BLD-RTO: 0 /100 WBCS (ref 0–0)
PLATELET # BLD AUTO: 250 X10*3/UL (ref 150–450)
POTASSIUM SERPL-SCNC: 3.4 MMOL/L (ref 3.5–5.3)
PROT SERPL-MCNC: 6.1 G/DL (ref 6.4–8.2)
RBC # BLD AUTO: 3.65 X10*6/UL (ref 4–5.2)
SODIUM SERPL-SCNC: 138 MMOL/L (ref 136–145)
WBC # BLD AUTO: 5.8 X10*3/UL (ref 4.4–11.3)

## 2024-04-07 PROCEDURE — 2500000004 HC RX 250 GENERAL PHARMACY W/ HCPCS (ALT 636 FOR OP/ED)

## 2024-04-07 PROCEDURE — 2500000002 HC RX 250 W HCPCS SELF ADMINISTERED DRUGS (ALT 637 FOR MEDICARE OP, ALT 636 FOR OP/ED)

## 2024-04-07 PROCEDURE — 80053 COMPREHEN METABOLIC PANEL: CPT | Performed by: INTERNAL MEDICINE

## 2024-04-07 PROCEDURE — 2500000005 HC RX 250 GENERAL PHARMACY W/O HCPCS

## 2024-04-07 PROCEDURE — 85027 COMPLETE CBC AUTOMATED: CPT

## 2024-04-07 PROCEDURE — 2500000004 HC RX 250 GENERAL PHARMACY W/ HCPCS (ALT 636 FOR OP/ED): Mod: JZ

## 2024-04-07 PROCEDURE — 36415 COLL VENOUS BLD VENIPUNCTURE: CPT

## 2024-04-07 PROCEDURE — 1100000001 HC PRIVATE ROOM DAILY

## 2024-04-07 PROCEDURE — 2500000001 HC RX 250 WO HCPCS SELF ADMINISTERED DRUGS (ALT 637 FOR MEDICARE OP)

## 2024-04-07 PROCEDURE — 99231 SBSQ HOSP IP/OBS SF/LOW 25: CPT

## 2024-04-07 RX ORDER — POTASSIUM CHLORIDE 20 MEQ/1
20 TABLET, EXTENDED RELEASE ORAL ONCE
Status: COMPLETED | OUTPATIENT
Start: 2024-04-07 | End: 2024-04-07

## 2024-04-07 RX ORDER — ONDANSETRON 4 MG/1
4 TABLET, FILM COATED ORAL EVERY 6 HOURS PRN
Status: DISCONTINUED | OUTPATIENT
Start: 2024-04-07 | End: 2024-04-13 | Stop reason: HOSPADM

## 2024-04-07 RX ADMIN — HEPARIN SODIUM 7500 UNITS: 5000 INJECTION INTRAVENOUS; SUBCUTANEOUS at 14:44

## 2024-04-07 RX ADMIN — ATORVASTATIN CALCIUM 20 MG: 20 TABLET, FILM COATED ORAL at 20:38

## 2024-04-07 RX ADMIN — FUROSEMIDE 40 MG: 40 TABLET ORAL at 09:04

## 2024-04-07 RX ADMIN — OXYBUTYNIN CHLORIDE 5 MG: 5 TABLET ORAL at 20:38

## 2024-04-07 RX ADMIN — CEFAZOLIN SODIUM 1 G: 1 INJECTION, SOLUTION INTRAVENOUS at 09:04

## 2024-04-07 RX ADMIN — SODIUM CHLORIDE, POTASSIUM CHLORIDE, SODIUM LACTATE AND CALCIUM CHLORIDE 500 ML: 600; 310; 30; 20 INJECTION, SOLUTION INTRAVENOUS at 23:13

## 2024-04-07 RX ADMIN — CEFAZOLIN SODIUM 1 G: 1 INJECTION, SOLUTION INTRAVENOUS at 20:38

## 2024-04-07 RX ADMIN — HEPARIN SODIUM 7500 UNITS: 5000 INJECTION INTRAVENOUS; SUBCUTANEOUS at 22:01

## 2024-04-07 RX ADMIN — HEPARIN SODIUM 7500 UNITS: 5000 INJECTION INTRAVENOUS; SUBCUTANEOUS at 05:24

## 2024-04-07 RX ADMIN — FERROUS GLUCONATE 324 MG: 324 TABLET ORAL at 08:57

## 2024-04-07 RX ADMIN — POTASSIUM CHLORIDE 20 MEQ: 1500 TABLET, EXTENDED RELEASE ORAL at 12:06

## 2024-04-07 RX ADMIN — FEBUXOSTAT 40 MG: 40 TABLET ORAL at 08:57

## 2024-04-07 RX ADMIN — FLUTICASONE FUROATE AND VILANTEROL TRIFENATATE 1 PUFF: 100; 25 POWDER RESPIRATORY (INHALATION) at 06:07

## 2024-04-07 RX ADMIN — LEVOTHYROXINE SODIUM 75 MCG: 75 TABLET ORAL at 05:24

## 2024-04-07 RX ADMIN — FERROUS GLUCONATE 324 MG: 324 TABLET ORAL at 20:38

## 2024-04-07 RX ADMIN — ONDANSETRON HYDROCHLORIDE 4 MG: 4 TABLET, FILM COATED ORAL at 10:22

## 2024-04-07 ASSESSMENT — COGNITIVE AND FUNCTIONAL STATUS - GENERAL
HELP NEEDED FOR BATHING: A LITTLE
DRESSING REGULAR LOWER BODY CLOTHING: A LITTLE
TOILETING: A LITTLE
CLIMB 3 TO 5 STEPS WITH RAILING: A LOT
MOBILITY SCORE: 16
DRESSING REGULAR UPPER BODY CLOTHING: A LITTLE
TURNING FROM BACK TO SIDE WHILE IN FLAT BAD: A LITTLE
DAILY ACTIVITIY SCORE: 19
WALKING IN HOSPITAL ROOM: A LOT
MOVING TO AND FROM BED TO CHAIR: A LITTLE
STANDING UP FROM CHAIR USING ARMS: A LITTLE
PERSONAL GROOMING: A LITTLE
MOVING FROM LYING ON BACK TO SITTING ON SIDE OF FLAT BED WITH BEDRAILS: A LITTLE

## 2024-04-07 ASSESSMENT — PAIN SCALES - GENERAL
PAINLEVEL_OUTOF10: 0 - NO PAIN

## 2024-04-07 NOTE — PROGRESS NOTES
"Magi Boyd is a 79 y.o. female on day 4 of admission presenting with Failure of outpatient treatment.    Subjective   Had some dizziness with PT yesterday when they tried to get her up walking.  Denies increase in pain.    Meds:   Scheduled medications  atenolol, 100 mg, oral, Daily  atorvastatin, 20 mg, oral, Daily  ceFAZolin, 1 g, intravenous, q12h  famotidine, 10 mg, oral, Every other day  febuxostat, 40 mg, oral, Daily  ferrous gluconate, 324 mg, oral, BID  fluticasone furoate-vilanteroL, 1 puff, inhalation, Daily  furosemide, 40 mg, oral, Daily  heparin (porcine), 7,500 Units, subcutaneous, q8h IMANI  levothyroxine, 75 mcg, oral, Daily  oxybutynin, 5 mg, oral, Nightly      Continuous medications     PRN medications  PRN medications: acetaminophen, alum-mag hydroxide-simeth, ondansetron, polyethylene glycol, traMADol    Physical Exam   Constitutional:       Appearance: Normal appearance.   HENT:      Head: Normocephalic and atraumatic.      Mouth/Throat:      Mouth: Mucous membranes are moist.      Pharynx: Oropharynx is clear.   Eyes:      Pupils: Pupils are equal, round, and reactive to light.   Cardiovascular:      Rate and Rhythm: Normal rate and regular rhythm.      Heart sounds: Normal heart sounds.   Pulmonary:      Effort: Pulmonary effort is normal.      Breath sounds: Normal breath sounds.   Abdominal:      General: Abdomen is flat. Bowel sounds are normal.      Palpations: Abdomen is soft.   Musculoskeletal:      Cervical back: Normal range of motion.      Comments: ulceration to right plantar heel measuring  3 x 2 cm;  95% slough ; no malodor or fluctuance;  no probe to bone;  serosanguinous drainage. Palpable pedal pulses/  Skin:     Comments: Lt thoracic healed zoster    Neurological:      Mental Status: She is alert.    Last Recorded Vitals  Blood pressure 115/70, pulse 67, temperature 36.4 °C (97.5 °F), temperature source Temporal, resp. rate 16, height 1.6 m (5' 3\"), weight 104 kg (229 " lb), SpO2 95 %.    Intake/Output last 3 Shifts:  I/O last 3 completed shifts:  In: 50 (0.5 mL/kg) [IV Piggyback:50]  Out: 2250 (21.7 mL/kg) [Urine:2250 (0.6 mL/kg/hr)]  Weight: 103.9 kg     Relevant Results   Susceptibility data from last 90 days.  Collected Specimen Info Organism Amoxicillin/Clavulanate Ampicillin Ampicillin/Sulbactam Cefazolin Cefepime Ciprofloxacin Gentamicin Nitrofurantoin Piperacillin/Tazobactam Trimethoprim/Sulfamethoxazole   03/14/24 Urine from Clean Catch/Voided Enterobacter cloacae complex R R R R S S S S S R   Vascular US lower extremity venous duplex bilateral    Result Date: 4/4/2024          [place] [address]  Tel [phone] and Fax [phone]  Vascular Lab Report VASC US LOWER EXTREMITY VENOUS DUPLEX BILATERAL  Patient Name:      CHANDNI Khalil Physician: [id number] Dayday De La Rosa DO Study Date:        4/4/2024            Ordering           [id number] BERNIE MCDANIEL                                        Physician:         AMANDA MRN/PID:           [id number]            Technologist:      Laila Aleman RVT Accession#:        [id number]        Technologist 2: Date of Birth/Age: 1945 / 79      Encounter#:        [id number]                    years Gender:            F Admission Status:  Inpatient           Location           [facility]                                        Performed:  Diagnosis/ICD: Localized (leg) edema-R60.0 CPT Codes:     24028 Peripheral venous duplex scan for DVT complete  CONCLUSIONS: Right Lower Venous: No evidence of acute deep vein thrombus visualized in the right lower extremity. Cannot rule out thrombus in non-visualized Peroneal vein due to edema. Right posterior tibial veins were visualized in segments due to edema; cannot rule out DVT in non-visualized segments. Left Lower Venous: No evidence of acute deep vein thrombus visualized in the left lower extremity. Cannot rule out thrombus in  non-visualized peroneal vein. Left posterior tibial veins were visualized in segments due to edema; cannot rule out DVT in non-visualized segments.  Imaging & Doppler Findings:  Right                 Compressible Thrombus        Flow Distal External Iliac     Yes        None   Spontaneous/Phasic CFV                       Yes        None   Spontaneous/Phasic PFV                       Yes        None FV Proximal               Yes        None   Spontaneous/Phasic FV Mid                    Yes        None FV Distal                 Yes        None Popliteal                 Yes        None   Spontaneous/Phasic  Left                  Compress Thrombus        Flow Distal External Iliac   Yes      None   Spontaneous/Phasic CFV                     Yes      None   Spontaneous/Phasic PFV                     Yes      None FV Proximal             Yes      None   Spontaneous/Phasic FV Mid                  Yes      None FV Distal               Yes      None Popliteal               Yes      None   Spontaneous/Phasic PTV                     Yes      None  [id number] Daydayregan De La Rosa  Electronically signed by [id number] Dayday De La Rosa DO on 4/4/2024 at 4:20:01 PM  ** Final **     MR foot right wo IV contrast    Result Date: 4/4/2024  Interpreted By:  Imelda David, STUDY: MRI of the right lower extremity without IV contrast;   INDICATION: Signs/Symptoms:concern for OM of R foot; Signs/Symptoms:r/out osteomyelitis   COMPARISON: Right foot radiographs dated 04/03/2024   ACCESSION NUMBER(S): [id number]; [id number]   ORDERING CLINICIAN: HAKAN BURNETTE   TECHNIQUE: Multiplanar multisequence MRI of the  right lower extremity was performed  without IV contrast.   FINDINGS: Soft tissues: There is a plantar soft tissue ulceration at the level of heel measuring a proximally 3 x 2 cm in AP and transverse dimensions respectively. There is diffuse subcutaneous soft tissue edema throughout the plantar soft tissues of the foot extending  distally to the level of forefoot. This is suggestive of cellulitis. There is also diffuse circumferential subcutaneous soft tissue edema extending throughout the included portion of distal leg above the level of ankle joint and extends distally along the dorsal lateral aspect of the foot. This is a nonspecific finding and could represent cellulitis versus venous stasis versus lymphedema. Evaluation for abscess is limited due to noncontrast technique. Within this limitation, there is no confluence fluid collection subjacent to the site of ulceration to definitively suggest an abscess. There is effacement of normal fat signal in the subcutaneous soft tissues at the level of ulceration extending deep to the level of the plantar fascia/posterior calcaneal tuberosity.   There is small volume fluid within the peroneal compartment tendon sheath suggestive of mild tenosynovitis. The evaluation of tendon morphology is limited due to mild motion artifacts on axial sequences. The flexor and extensor compartment tendons appear grossly unremarkable. There is mild patchy T2 hyperintense signal in the plantar foot musculature with mild fatty atrophy on T1 weighted sequences. This is most likely favored to represent diabetic ischemic myopathy. No evidence of intramuscular fluid collections within limits of noncontrast technique. There is small plantar calcaneal enthesophyte with mild thickening of the central band of plantar fascia suggestive of chronic plantar fasciitis. No abnormal signal is noted in the plantar fascia within limits of evaluation.   Bones:  There is effacement of normal fat signal in the plantar soft tissues at the level of posterior calcaneal tuberosity adjacent to the site of ulceration (as seen on sagittal image 13/32). There is very faint bone marrow edema along the plantar cortex of posterior calcaneal tuberosity subjacent to the level of ulceration. However no evidence of confluence loss of normal fat  signal to suggest osteomyelitis. There are mild degenerative changes at the midfoot articulations, more pronounced at 1st through 5th tarsometatarsal joints with mild subchondral cystic changes. Tibiotalar joint articulation is maintained and demonstrates mild-to-moderate degenerative changes with trace tibiotalar joint effusion. Otherwise rest of the osseous structures appear grossly unremarkable.       1. Plantar heel soft tissue ulceration with subjacent cellulitis and mild reactive osteitis along the plantar cortex of posterior calcaneal tuberosity as described above. No definite MRI evidence of osteomyelitis at this point in time. No evidence of abscess within limits of noncontrast technique. 2. Mild tenosynovitis of the peroneal compartment tendons in the retro malleolar groove. This is most likely favored to be reactive, however infectious tenosynovitis can not be completely excluded. 3. Extensive circumferential subcutaneous soft tissue edema in the visualized distal leg extending along the dorsal lateral aspect of the foot is nonspecific finding. This could be related to cellulitis versus venous stasis versus lymphedema. Recommend clinical correlation. 4. Signal abnormality in the plantar foot musculature is most likely favored to be related to chronic diabetic ischemic myopathy. Infectious myositis can also be considered in the differential.   MACRO: None   Signed by: Imelda David 4/4/2024 1:12 PM Dictation workstation:   [id number]    MR ankle right wo IV contrast    Result Date: 4/4/2024  Interpreted By:  Imelda David, STUDY: MRI of the right lower extremity without IV contrast;   INDICATION: Signs/Symptoms:concern for OM of R foot; Signs/Symptoms:r/out osteomyelitis   COMPARISON: Right foot radiographs dated 04/03/2024   ACCESSION NUMBER(S): [id number]; [id number]   ORDERING CLINICIAN: HAKAN BURNETTE   TECHNIQUE: Multiplanar multisequence MRI of the  right lower extremity  was performed  without IV contrast.   FINDINGS: Soft tissues: There is a plantar soft tissue ulceration at the level of heel measuring a proximally 3 x 2 cm in AP and transverse dimensions respectively. There is diffuse subcutaneous soft tissue edema throughout the plantar soft tissues of the foot extending distally to the level of forefoot. This is suggestive of cellulitis. There is also diffuse circumferential subcutaneous soft tissue edema extending throughout the included portion of distal leg above the level of ankle joint and extends distally along the dorsal lateral aspect of the foot. This is a nonspecific finding and could represent cellulitis versus venous stasis versus lymphedema. Evaluation for abscess is limited due to noncontrast technique. Within this limitation, there is no confluence fluid collection subjacent to the site of ulceration to definitively suggest an abscess. There is effacement of normal fat signal in the subcutaneous soft tissues at the level of ulceration extending deep to the level of the plantar fascia/posterior calcaneal tuberosity.   There is small volume fluid within the peroneal compartment tendon sheath suggestive of mild tenosynovitis. The evaluation of tendon morphology is limited due to mild motion artifacts on axial sequences. The flexor and extensor compartment tendons appear grossly unremarkable. There is mild patchy T2 hyperintense signal in the plantar foot musculature with mild fatty atrophy on T1 weighted sequences. This is most likely favored to represent diabetic ischemic myopathy. No evidence of intramuscular fluid collections within limits of noncontrast technique. There is small plantar calcaneal enthesophyte with mild thickening of the central band of plantar fascia suggestive of chronic plantar fasciitis. No abnormal signal is noted in the plantar fascia within limits of evaluation.   Bones:  There is effacement of normal fat signal in the plantar soft tissues  at the level of posterior calcaneal tuberosity adjacent to the site of ulceration (as seen on sagittal image 13/32). There is very faint bone marrow edema along the plantar cortex of posterior calcaneal tuberosity subjacent to the level of ulceration. However no evidence of confluence loss of normal fat signal to suggest osteomyelitis. There are mild degenerative changes at the midfoot articulations, more pronounced at 1st through 5th tarsometatarsal joints with mild subchondral cystic changes. Tibiotalar joint articulation is maintained and demonstrates mild-to-moderate degenerative changes with trace tibiotalar joint effusion. Otherwise rest of the osseous structures appear grossly unremarkable.       1. Plantar heel soft tissue ulceration with subjacent cellulitis and mild reactive osteitis along the plantar cortex of posterior calcaneal tuberosity as described above. No definite MRI evidence of osteomyelitis at this point in time. No evidence of abscess within limits of noncontrast technique. 2. Mild tenosynovitis of the peroneal compartment tendons in the retro malleolar groove. This is most likely favored to be reactive, however infectious tenosynovitis can not be completely excluded. 3. Extensive circumferential subcutaneous soft tissue edema in the visualized distal leg extending along the dorsal lateral aspect of the foot is nonspecific finding. This could be related to cellulitis versus venous stasis versus lymphedema. Recommend clinical correlation. 4. Signal abnormality in the plantar foot musculature is most likely favored to be related to chronic diabetic ischemic myopathy. Infectious myositis can also be considered in the differential.   MACRO: None   Signed by: Imelda David 4/4/2024 1:12 PM Dictation workstation:   [id number]    XR foot right 3+ views    Result Date: 4/3/2024  Interpreted By:  Chris Martinez, STUDY: XR FOOT RIGHT 3+ VIEWS; ;  4/3/2024 5:31 pm   INDICATION: Signs/Symptoms:Right  foot wound with right heel failure of outpatient antibiotics.   COMPARISON: 02/14/2024   ACCESSION NUMBER(S): [id number]   ORDERING CLINICIAN: JOSE CARLOS BURRELL   FINDINGS: Right foot, three views   Skin and soft tissue defect at the plantar aspect of the calcaneus. There is no osseous destruction or erosion radiographically. There is diffuse severe osteopenia however. Degenerative changes in the hindfoot.       No radiographic evidence of osteomyelitis. Skin and soft tissue defect underlying the calcaneus. If there is persistent concern MRI can be performed for further evaluation     MACRO: None   Signed by: Chris Martinez 4/3/2024 5:33 PM Dictation workstation:   [id number]   Results for orders placed or performed during the hospital encounter of 04/03/24 (from the past 24 hour(s))   CBC   Result Value Ref Range    WBC 5.8 4.4 - 11.3 x10*3/uL    nRBC 0.0 0.0 - 0.0 /100 WBCs    RBC 3.65 (L) 4.00 - 5.20 x10*6/uL    Hemoglobin 11.1 (L) 12.0 - 16.0 g/dL    Hematocrit 34.4 (L) 36.0 - 46.0 %    MCV 94 80 - 100 fL    MCH 30.4 26.0 - 34.0 pg    MCHC 32.3 32.0 - 36.0 g/dL    RDW 12.6 11.5 - 14.5 %    Platelets 250 150 - 450 x10*3/uL   Comprehensive metabolic panel   Result Value Ref Range    Glucose 88 74 - 99 mg/dL    Sodium 138 136 - 145 mmol/L    Potassium 3.4 (L) 3.5 - 5.3 mmol/L    Chloride 101 98 - 107 mmol/L    Bicarbonate 28 21 - 32 mmol/L    Anion Gap 12 10 - 20 mmol/L    Urea Nitrogen 25 (H) 6 - 23 mg/dL    Creatinine 2.43 (H) 0.50 - 1.05 mg/dL    eGFR 20 (L) >60 mL/min/1.73m*2    Calcium 9.0 8.6 - 10.3 mg/dL    Albumin 2.8 (L) 3.4 - 5.0 g/dL    Alkaline Phosphatase 55 33 - 136 U/L    Total Protein 6.1 (L) 6.4 - 8.2 g/dL    AST 34 9 - 39 U/L    Bilirubin, Total 0.3 0.0 - 1.2 mg/dL    ALT 9 7 - 45 U/L       Assessment/Plan   Principal Problem:    Failure of outpatient treatment    Right plantar heel ulceration  Slight improvement in ulceration today.  Applied medihoney aquacel, ABD, kerlix and ace bandage.   Daily dressing changes.  Keep heel offloaded.  Recommend santyl to wound if possible.  IV antibiotics per ID.   C&S pending  Will follow.  OK to WB on right foot with fracture boot.        Vanessa Oliva DPM

## 2024-04-07 NOTE — PROGRESS NOTES
Magi Boyd is a 79 y.o. female on day 4 of admission presenting with Failure of outpatient treatment.      Subjective   This morning pt has a boot on her R foot after podiatry performed debridement and culture obtained 4/6. She had some nausea today and Zofran ordered. No other complaints. Discussed plan.     Objective     Last Recorded Vitals  /70 (BP Location: Right arm, Patient Position: Lying)   Pulse 67   Temp 36.4 °C (97.5 °F) (Temporal)   Resp 16   Wt 104 kg (229 lb)   SpO2 95%   Intake/Output last 3 Shifts:    Intake/Output Summary (Last 24 hours) at 4/7/2024 1035  Last data filed at 4/7/2024 0513  Gross per 24 hour   Intake --   Output 1000 ml   Net -1000 ml       Admission Weight  Weight: 104 kg (229 lb) (04/03/24 1610)    Daily Weight  04/03/24 : 104 kg (229 lb)    Image Results  Vascular US lower extremity venous duplex bilateral            [place] [address]   Tel [phone] and Fax [phone]       Vascular Lab Report  [facility] US LOWER EXTREMITY VENOUS DUPLEX BILATERAL       Patient Name:      MAGI BOYD  Reading Physician: [id number] Dayday De La Rosa DO  Study Date:        4/4/2024            Ordering           [id number] BERNIE MCDANIEL                                         Physician:         AMANDA  MRN/PID:           [id number]            Technologist:      Laila Aleman JAYNE  Accession#:        [id number]        Technologist 2:  Date of Birth/Age: 1945 / 79      Encounter#:        [id number]                     years  Gender:            F  Admission Status:  Inpatient           Location           [facility]                                         Performed:       Diagnosis/ICD: Localized (leg) edema-R60.0  CPT Codes:     49250 Peripheral venous duplex scan for DVT complete       CONCLUSIONS:  Right Lower Venous: No evidence of acute deep vein thrombus visualized in the right lower extremity. Cannot rule out thrombus in  non-visualized Peroneal vein due to edema. Right posterior tibial veins were visualized in segments due to edema; cannot rule out DVT in non-visualized segments.  Left Lower Venous: No evidence of acute deep vein thrombus visualized in the left lower extremity. Cannot rule out thrombus in non-visualized peroneal vein. Left posterior tibial veins were visualized in segments due to edema; cannot rule out DVT in non-visualized segments.     Imaging & Doppler Findings:     Right                 Compressible Thrombus        Flow  Distal External Iliac     Yes        None   Spontaneous/Phasic  CFV                       Yes        None   Spontaneous/Phasic  PFV                       Yes        None  FV Proximal               Yes        None   Spontaneous/Phasic  FV Mid                    Yes        None  FV Distal                 Yes        None  Popliteal                 Yes        None   Spontaneous/Phasic       Left                  Compress Thrombus        Flow  Distal External Iliac   Yes      None   Spontaneous/Phasic  CFV                     Yes      None   Spontaneous/Phasic  PFV                     Yes      None  FV Proximal             Yes      None   Spontaneous/Phasic  FV Mid                  Yes      None  FV Distal               Yes      None  Popliteal               Yes      None   Spontaneous/Phasic  PTV                     Yes      None       [id number] Dayday De La Rosa DO  Electronically signed by [id number] Dayday De La Rosa DO on 4/4/2024 at 4:20:01 PM       ** Final **  MR foot right wo IV contrast, MR ankle right wo IV contrast  Narrative: Interpreted By:  Imelda David,   STUDY:  MRI of the right lower extremity without IV contrast;      INDICATION:  Signs/Symptoms:concern for OM of R foot; Signs/Symptoms:r/out  osteomyelitis      COMPARISON:  Right foot radiographs dated 04/03/2024      ACCESSION NUMBER(S):  [id number]; [id number]      ORDERING CLINICIAN:  HAKAN LEI; SHILPA BURNETTE       TECHNIQUE:  Multiplanar multisequence MRI of the  right lower extremity was  performed  without IV contrast.      FINDINGS:  Soft tissues: There is a plantar soft tissue ulceration at the level  of heel measuring a proximally 3 x 2 cm in AP and transverse  dimensions respectively. There is diffuse subcutaneous soft tissue  edema throughout the plantar soft tissues of the foot extending  distally to the level of forefoot. This is suggestive of cellulitis.  There is also diffuse circumferential subcutaneous soft tissue edema  extending throughout the included portion of distal leg above the  level of ankle joint and extends distally along the dorsal lateral  aspect of the foot. This is a nonspecific finding and could represent  cellulitis versus venous stasis versus lymphedema. Evaluation for  abscess is limited due to noncontrast technique. Within this  limitation, there is no confluence fluid collection subjacent to the  site of ulceration to definitively suggest an abscess. There is  effacement of normal fat signal in the subcutaneous soft tissues at  the level of ulceration extending deep to the level of the plantar  fascia/posterior calcaneal tuberosity.      There is small volume fluid within the peroneal compartment tendon  sheath suggestive of mild tenosynovitis. The evaluation of tendon  morphology is limited due to mild motion artifacts on axial  sequences. The flexor and extensor compartment tendons appear grossly  unremarkable. There is mild patchy T2 hyperintense signal in the  plantar foot musculature with mild fatty atrophy on T1 weighted  sequences. This is most likely favored to represent diabetic ischemic  myopathy. No evidence of intramuscular fluid collections within  limits of noncontrast technique. There is small plantar calcaneal  enthesophyte with mild thickening of the central band of plantar  fascia suggestive of chronic plantar fasciitis. No abnormal signal is  noted in the plantar fascia  within limits of evaluation.      Bones:  There is effacement of normal fat signal in the plantar soft  tissues at the level of posterior calcaneal tuberosity adjacent to  the site of ulceration (as seen on sagittal image 13/32). There is  very faint bone marrow edema along the plantar cortex of posterior  calcaneal tuberosity subjacent to the level of ulceration. However no  evidence of confluence loss of normal fat signal to suggest  osteomyelitis. There are mild degenerative changes at the midfoot  articulations, more pronounced at 1st through 5th tarsometatarsal  joints with mild subchondral cystic changes. Tibiotalar joint  articulation is maintained and demonstrates mild-to-moderate  degenerative changes with trace tibiotalar joint effusion. Otherwise  rest of the osseous structures appear grossly unremarkable.      Impression: 1. Plantar heel soft tissue ulceration with subjacent cellulitis and  mild reactive osteitis along the plantar cortex of posterior  calcaneal tuberosity as described above. No definite MRI evidence of  osteomyelitis at this point in time. No evidence of abscess within  limits of noncontrast technique.  2. Mild tenosynovitis of the peroneal compartment tendons in the  retro malleolar groove. This is most likely favored to be reactive,  however infectious tenosynovitis can not be completely excluded.  3. Extensive circumferential subcutaneous soft tissue edema in the  visualized distal leg extending along the dorsal lateral aspect of  the foot is nonspecific finding. This could be related to cellulitis  versus venous stasis versus lymphedema. Recommend clinical  correlation.  4. Signal abnormality in the plantar foot musculature is most likely  favored to be related to chronic diabetic ischemic myopathy.  Infectious myositis can also be considered in the differential.      MACRO:  None      Signed by: Imelda David 4/4/2024 1:12 PM  Dictation workstation:   [id number]      Physical  Exam  Vitals reviewed.   Constitutional:       General: She is not in acute distress.  HENT:      Head: Normocephalic and atraumatic.   Eyes:      Extraocular Movements: Extraocular movements intact.      Conjunctiva/sclera: Conjunctivae normal.   Cardiovascular:      Rate and Rhythm: Normal rate and regular rhythm.      Heart sounds: No murmur heard.     No friction rub. No gallop.   Pulmonary:      Effort: Pulmonary effort is normal.      Breath sounds: Normal breath sounds. No wheezing, rhonchi or rales.   Abdominal:      General: Bowel sounds are normal.      Palpations: Abdomen is soft. There is no mass.      Tenderness: There is no abdominal tenderness. There is no guarding or rebound.   Musculoskeletal:      Cervical back: Neck supple.      Right lower leg: No edema.      Left lower leg: No edema.      Comments: R foot in boot with bandages over foot    Skin:     General: Skin is warm and dry.      Findings: No bruising or rash.   Neurological:      Mental Status: She is alert. Mental status is at baseline.      Comments: Answering questions, following commands. Moving all extremities.    Psychiatric:         Mood and Affect: Mood and affect normal.       Relevant Results      Scheduled medications  atenolol, 100 mg, oral, Daily  atorvastatin, 20 mg, oral, Daily  ceFAZolin, 1 g, intravenous, q12h  famotidine, 10 mg, oral, Every other day  febuxostat, 40 mg, oral, Daily  ferrous gluconate, 324 mg, oral, BID  fluticasone furoate-vilanteroL, 1 puff, inhalation, Daily  furosemide, 40 mg, oral, Daily  heparin (porcine), 7,500 Units, subcutaneous, q8h IMANI  levothyroxine, 75 mcg, oral, Daily  oxybutynin, 5 mg, oral, Nightly      Continuous medications     PRN medications  PRN medications: acetaminophen, alum-mag hydroxide-simeth, ondansetron, polyethylene glycol, traMADol  Results for orders placed or performed during the hospital encounter of 04/03/24 (from the past 24 hour(s))   CBC   Result Value Ref Range    WBC  5.8 4.4 - 11.3 x10*3/uL    nRBC 0.0 0.0 - 0.0 /100 WBCs    RBC 3.65 (L) 4.00 - 5.20 x10*6/uL    Hemoglobin 11.1 (L) 12.0 - 16.0 g/dL    Hematocrit 34.4 (L) 36.0 - 46.0 %    MCV 94 80 - 100 fL    MCH 30.4 26.0 - 34.0 pg    MCHC 32.3 32.0 - 36.0 g/dL    RDW 12.6 11.5 - 14.5 %    Platelets 250 150 - 450 x10*3/uL   Comprehensive metabolic panel   Result Value Ref Range    Glucose 88 74 - 99 mg/dL    Sodium 138 136 - 145 mmol/L    Potassium 3.4 (L) 3.5 - 5.3 mmol/L    Chloride 101 98 - 107 mmol/L    Bicarbonate 28 21 - 32 mmol/L    Anion Gap 12 10 - 20 mmol/L    Urea Nitrogen 25 (H) 6 - 23 mg/dL    Creatinine 2.43 (H) 0.50 - 1.05 mg/dL    eGFR 20 (L) >60 mL/min/1.73m*2    Calcium 9.0 8.6 - 10.3 mg/dL    Albumin 2.8 (L) 3.4 - 5.0 g/dL    Alkaline Phosphatase 55 33 - 136 U/L    Total Protein 6.1 (L) 6.4 - 8.2 g/dL    AST 34 9 - 39 U/L    Bilirubin, Total 0.3 0.0 - 1.2 mg/dL    ALT 9 7 - 45 U/L     Vascular US lower extremity venous duplex bilateral    Result Date: 4/4/2024          [place] [address]  Tel [phone] and Fax [phone]  Vascular Lab Report VASC US LOWER EXTREMITY VENOUS DUPLEX BILATERAL  Patient Name:      CHANDNI Khalil Physician: [id number] Dayday De La Rosa DO Study Date:        4/4/2024            Ordering           [id number] BERNIE MCDANIEL                                        Physician:         AMANDA MRN/PID:           [id number]            Technologist:      Laila Aleman RVT Accession#:        [id number]        Technologist 2: Date of Birth/Age: 1945 / 79      Encounter#:        [id number]                    years Gender:            F Admission Status:  Inpatient           Location           [facility]                                        Performed:  Diagnosis/ICD: Localized (leg) edema-R60.0 CPT Codes:     92546 Peripheral venous duplex scan for DVT complete  CONCLUSIONS: Right Lower Venous: No evidence of acute deep vein thrombus  visualized in the right lower extremity. Cannot rule out thrombus in non-visualized Peroneal vein due to edema. Right posterior tibial veins were visualized in segments due to edema; cannot rule out DVT in non-visualized segments. Left Lower Venous: No evidence of acute deep vein thrombus visualized in the left lower extremity. Cannot rule out thrombus in non-visualized peroneal vein. Left posterior tibial veins were visualized in segments due to edema; cannot rule out DVT in non-visualized segments.  Imaging & Doppler Findings:  Right                 Compressible Thrombus        Flow Distal External Iliac     Yes        None   Spontaneous/Phasic CFV                       Yes        None   Spontaneous/Phasic PFV                       Yes        None FV Proximal               Yes        None   Spontaneous/Phasic FV Mid                    Yes        None FV Distal                 Yes        None Popliteal                 Yes        None   Spontaneous/Phasic  Left                  Compress Thrombus        Flow Distal External Iliac   Yes      None   Spontaneous/Phasic CFV                     Yes      None   Spontaneous/Phasic PFV                     Yes      None FV Proximal             Yes      None   Spontaneous/Phasic FV Mid                  Yes      None FV Distal               Yes      None Popliteal               Yes      None   Spontaneous/Phasic PTV                     Yes      None  [id number] Dayday De La Rosa DO Electronically signed by [id number] Dayday De La Rosa DO on 4/4/2024 at 4:20:01 PM  ** Final **     MR foot right wo IV contrast    Result Date: 4/4/2024  Interpreted By:  Imelda David, STUDY: MRI of the right lower extremity without IV contrast;   INDICATION: Signs/Symptoms:concern for OM of R foot; Signs/Symptoms:r/out osteomyelitis   COMPARISON: Right foot radiographs dated 04/03/2024   ACCESSION NUMBER(S): [id number]; [id number]   ORDERING CLINICIAN: HAKAN BURNETTE   TECHNIQUE: Multiplanar  multisequence MRI of the  right lower extremity was performed  without IV contrast.   FINDINGS: Soft tissues: There is a plantar soft tissue ulceration at the level of heel measuring a proximally 3 x 2 cm in AP and transverse dimensions respectively. There is diffuse subcutaneous soft tissue edema throughout the plantar soft tissues of the foot extending distally to the level of forefoot. This is suggestive of cellulitis. There is also diffuse circumferential subcutaneous soft tissue edema extending throughout the included portion of distal leg above the level of ankle joint and extends distally along the dorsal lateral aspect of the foot. This is a nonspecific finding and could represent cellulitis versus venous stasis versus lymphedema. Evaluation for abscess is limited due to noncontrast technique. Within this limitation, there is no confluence fluid collection subjacent to the site of ulceration to definitively suggest an abscess. There is effacement of normal fat signal in the subcutaneous soft tissues at the level of ulceration extending deep to the level of the plantar fascia/posterior calcaneal tuberosity.   There is small volume fluid within the peroneal compartment tendon sheath suggestive of mild tenosynovitis. The evaluation of tendon morphology is limited due to mild motion artifacts on axial sequences. The flexor and extensor compartment tendons appear grossly unremarkable. There is mild patchy T2 hyperintense signal in the plantar foot musculature with mild fatty atrophy on T1 weighted sequences. This is most likely favored to represent diabetic ischemic myopathy. No evidence of intramuscular fluid collections within limits of noncontrast technique. There is small plantar calcaneal enthesophyte with mild thickening of the central band of plantar fascia suggestive of chronic plantar fasciitis. No abnormal signal is noted in the plantar fascia within limits of evaluation.   Bones:  There is effacement  of normal fat signal in the plantar soft tissues at the level of posterior calcaneal tuberosity adjacent to the site of ulceration (as seen on sagittal image 13/32). There is very faint bone marrow edema along the plantar cortex of posterior calcaneal tuberosity subjacent to the level of ulceration. However no evidence of confluence loss of normal fat signal to suggest osteomyelitis. There are mild degenerative changes at the midfoot articulations, more pronounced at 1st through 5th tarsometatarsal joints with mild subchondral cystic changes. Tibiotalar joint articulation is maintained and demonstrates mild-to-moderate degenerative changes with trace tibiotalar joint effusion. Otherwise rest of the osseous structures appear grossly unremarkable.       1. Plantar heel soft tissue ulceration with subjacent cellulitis and mild reactive osteitis along the plantar cortex of posterior calcaneal tuberosity as described above. No definite MRI evidence of osteomyelitis at this point in time. No evidence of abscess within limits of noncontrast technique. 2. Mild tenosynovitis of the peroneal compartment tendons in the retro malleolar groove. This is most likely favored to be reactive, however infectious tenosynovitis can not be completely excluded. 3. Extensive circumferential subcutaneous soft tissue edema in the visualized distal leg extending along the dorsal lateral aspect of the foot is nonspecific finding. This could be related to cellulitis versus venous stasis versus lymphedema. Recommend clinical correlation. 4. Signal abnormality in the plantar foot musculature is most likely favored to be related to chronic diabetic ischemic myopathy. Infectious myositis can also be considered in the differential.   MACRO: None   Signed by: Imelda David 4/4/2024 1:12 PM Dictation workstation:   [id number]    MR ankle right wo IV contrast    Result Date: 4/4/2024  Interpreted By:  Imelda David, STUDY: MRI of the right lower  extremity without IV contrast;   INDICATION: Signs/Symptoms:concern for OM of R foot; Signs/Symptoms:r/out osteomyelitis   COMPARISON: Right foot radiographs dated 04/03/2024   ACCESSION NUMBER(S): [id number]; [id number]   ORDERING CLINICIAN: HAKAN BURNETTE   TECHNIQUE: Multiplanar multisequence MRI of the  right lower extremity was performed  without IV contrast.   FINDINGS: Soft tissues: There is a plantar soft tissue ulceration at the level of heel measuring a proximally 3 x 2 cm in AP and transverse dimensions respectively. There is diffuse subcutaneous soft tissue edema throughout the plantar soft tissues of the foot extending distally to the level of forefoot. This is suggestive of cellulitis. There is also diffuse circumferential subcutaneous soft tissue edema extending throughout the included portion of distal leg above the level of ankle joint and extends distally along the dorsal lateral aspect of the foot. This is a nonspecific finding and could represent cellulitis versus venous stasis versus lymphedema. Evaluation for abscess is limited due to noncontrast technique. Within this limitation, there is no confluence fluid collection subjacent to the site of ulceration to definitively suggest an abscess. There is effacement of normal fat signal in the subcutaneous soft tissues at the level of ulceration extending deep to the level of the plantar fascia/posterior calcaneal tuberosity.   There is small volume fluid within the peroneal compartment tendon sheath suggestive of mild tenosynovitis. The evaluation of tendon morphology is limited due to mild motion artifacts on axial sequences. The flexor and extensor compartment tendons appear grossly unremarkable. There is mild patchy T2 hyperintense signal in the plantar foot musculature with mild fatty atrophy on T1 weighted sequences. This is most likely favored to represent diabetic ischemic myopathy. No evidence of intramuscular fluid  collections within limits of noncontrast technique. There is small plantar calcaneal enthesophyte with mild thickening of the central band of plantar fascia suggestive of chronic plantar fasciitis. No abnormal signal is noted in the plantar fascia within limits of evaluation.   Bones:  There is effacement of normal fat signal in the plantar soft tissues at the level of posterior calcaneal tuberosity adjacent to the site of ulceration (as seen on sagittal image 13/32). There is very faint bone marrow edema along the plantar cortex of posterior calcaneal tuberosity subjacent to the level of ulceration. However no evidence of confluence loss of normal fat signal to suggest osteomyelitis. There are mild degenerative changes at the midfoot articulations, more pronounced at 1st through 5th tarsometatarsal joints with mild subchondral cystic changes. Tibiotalar joint articulation is maintained and demonstrates mild-to-moderate degenerative changes with trace tibiotalar joint effusion. Otherwise rest of the osseous structures appear grossly unremarkable.       1. Plantar heel soft tissue ulceration with subjacent cellulitis and mild reactive osteitis along the plantar cortex of posterior calcaneal tuberosity as described above. No definite MRI evidence of osteomyelitis at this point in time. No evidence of abscess within limits of noncontrast technique. 2. Mild tenosynovitis of the peroneal compartment tendons in the retro malleolar groove. This is most likely favored to be reactive, however infectious tenosynovitis can not be completely excluded. 3. Extensive circumferential subcutaneous soft tissue edema in the visualized distal leg extending along the dorsal lateral aspect of the foot is nonspecific finding. This could be related to cellulitis versus venous stasis versus lymphedema. Recommend clinical correlation. 4. Signal abnormality in the plantar foot musculature is most likely favored to be related to chronic  diabetic ischemic myopathy. Infectious myositis can also be considered in the differential.   MACRO: None   Signed by: Imelda David 4/4/2024 1:12 PM Dictation workstation:   [id number]    XR foot right 3+ views    Result Date: 4/3/2024  Interpreted By:  Chris Martinez, STUDY: XR FOOT RIGHT 3+ VIEWS; ;  4/3/2024 5:31 pm   INDICATION: Signs/Symptoms:Right foot wound with right heel failure of outpatient antibiotics.   COMPARISON: 02/14/2024   ACCESSION NUMBER(S): [id number]   ORDERING CLINICIAN: JOSE CARLOS BURRELL   FINDINGS: Right foot, three views   Skin and soft tissue defect at the plantar aspect of the calcaneus. There is no osseous destruction or erosion radiographically. There is diffuse severe osteopenia however. Degenerative changes in the hindfoot.       No radiographic evidence of osteomyelitis. Skin and soft tissue defect underlying the calcaneus. If there is persistent concern MRI can be performed for further evaluation     MACRO: None   Signed by: Chris Martinez 4/3/2024 5:33 PM Dictation workstation:   [id number]            Assessment/Plan          Principal Problem:    Failure of outpatient treatment    Magi Boyd is a 79 y.o. female with a PMHx of gout, HTN, chronic venous insufficiency, CKD stage IV, hypothyroidism, overactive bladder, asthma who presented for C/O worsening right heel wound x 2-3 days despite out-pt abx tx associated with mal-odorous, yellowish brown drainage and pain. She was advised by her podiatrist to come to the ED.     Active Issues:   #R heel pressure ulcer stage II vs stage III s/p debridement 4/6  #R heel cellulitis  #RLE edema > LLE edema   -iv vancomycin and iv cefepime for presumptive OM   -podiatry consulted to evaluate for debridement  -ID consulted for abx management  -MRI R foot/ankle with plantar heel soft tissue ulceration with subjacent cellulitis and mild reactive osteitis along the plantar cortex of posterior calcaneal tuberosity with no definite  MRI evidence of osteomyelitis.   -B/l dvt us negative for dvt  -S/p debridement 4/6   Plan:  -On cefazolin, wound cultures 4/6 pending   -Plan to dc on keflex x7d  -PT/OT rec snf, talked with [facility]   -prn zofran nausea     #VZV infection  -present in a single dermatome  -vesicular rash has crusted over  -pt already treated with acyclovir 400mg x 5 days.  -contact precautions in place     Chronic Issues:   #Gout: C/w febuxostat  #ID: C/w ferrous gluconate  #Asthma: C/w home inhalers  #Hypothyroidism: Continue with levothyroxine  #HTN: C/w with atenolol  #HLD: c/w atorvastatin  #GERD: c/w Famotidine and maalox  #CVI and leg swelling: C/w furosemide 40 mg [prescribed by Dr. Narayan]  #CKD stage IV: FU with nephro     F: None  E: Replete as needed  N: adult reg diet  G: famotidine  VTE: Heparin     Code:   Full Code     Disposition: 79 y.o.female admitted for R heel pressure ulcer concerning for wound infection vs OM being managed with cefazolin with podiatry and ID consulted. Pending SNF placement              Anya Gregorio DO

## 2024-04-07 NOTE — CARE PLAN
The patient's goals for the shift include      The clinical goals for the shift include pt will remain free from pain r/t heel wound by end of shift

## 2024-04-07 NOTE — PROGRESS NOTES
Magi Boyd is a 79 y.o. female on day 4 of admission presenting with Failure of outpatient treatment.    Subjective   Interval History: no fever, no new complaints        Review of Systems    Objective   Range of Vitals (last 24 hours)  Heart Rate:  [66-72]   Temp:  [36.3 °C (97.3 °F)-36.5 °C (97.7 °F)]   Resp:  [16-20]   BP: (113-125)/(53-70)   SpO2:  [95 %]   Daily Weight  04/03/24 : 104 kg (229 lb)    Body mass index is 40.57 kg/m².    Physical Exam  Constitutional:       Appearance: Normal appearance.   HENT:      Head: Normocephalic and atraumatic.      Mouth/Throat:      Mouth: Mucous membranes are moist.      Pharynx: Oropharynx is clear.   Eyes:      Pupils: Pupils are equal, round, and reactive to light.   Cardiovascular:      Rate and Rhythm: Normal rate and regular rhythm.      Heart sounds: Normal heart sounds.   Pulmonary:      Effort: Pulmonary effort is normal.      Breath sounds: Normal breath sounds.   Abdominal:      General: Abdomen is flat. Bowel sounds are normal.      Palpations: Abdomen is soft.   Musculoskeletal:      Cervical back: Normal range of motion.      Comments: Stasis  Rt heel wound   Neurological:      Mental Status: She is alert.         Antibiotics  cefepime (Maxipime) IV 2 g  vancomycin (Vancocin) 2000 mg/500 ml in D5W 500 mL IV piggyback 2,000 mg  metroNIDAZOLE (Flagyl) 500 mg in NaCl (iso-os) 100 mL  heparin (porcine) injection 7,500 Units  polyethylene glycol (Glycolax, Miralax) packet 17 g  atenolol (Tenormin) tablet 100 mg  atorvastatin (Lipitor) tablet 20 mg  fluticasone furoate-vilanteroL (Breo Ellipta) 100-25 mcg/dose inhaler 1 puff  famotidine-Ca carb-mag hydrox (Pepcid Complete) -165 mg chewable tablet 1 tablet  febuxostat (Uloric) tablet 40 mg  ferrous gluconate (Fergon) 324 (38 Fe) mg tablet 324 mg  furosemide (Lasix) tablet 40 mg  levothyroxine (Synthroid, Levoxyl) tablet 75 mcg  oxybutynin (Ditropan) tablet 5 mg  solifenacin (VESIcare) tablet 5  mg  traMADol (Ultram) tablet  famotidine (Pepcid) tablet 10 mg  alum-mag hydroxide-simeth (Mylanta) 200-200-20 mg/5 mL oral suspension 30 mL  cefepime (Maxipime) 1 g in dextrose 5% 50 mL IV  vancomycin (Vancocin) pharmacy to dose - pharmacy monitoring  acyclovir (Zovirax) capsule 400 mg  traMADol (Ultram) tablet 50 mg  ceFAZolin in dextrose (iso-os) (Ancef) IVPB 1 g      Relevant Results  Labs  Results from last 72 hours   Lab Units 04/07/24  0632 04/06/24  0642 04/05/24  0638   WBC AUTO x10*3/uL 5.8 4.9 4.9   HEMOGLOBIN g/dL 11.1* 10.8* 10.0*   HEMATOCRIT % 34.4* 33.4* 31.2*   PLATELETS AUTO x10*3/uL 250 243 219       Results from last 72 hours   Lab Units 04/07/24  0632 04/06/24  0642   SODIUM mmol/L 138 137   POTASSIUM mmol/L 3.4* 3.5   CHLORIDE mmol/L 101 102   CO2 mmol/L 28 27   BUN mg/dL 25* 27*   CREATININE mg/dL 2.43* 2.30*   GLUCOSE mg/dL 88 87   CALCIUM mg/dL 9.0 9.1   ANION GAP mmol/L 12 12   EGFR mL/min/1.73m*2 20* 21*       Results from last 72 hours   Lab Units 04/07/24  0632 04/06/24  0642   ALK PHOS U/L 55 54   BILIRUBIN TOTAL mg/dL 0.3 0.4   PROTEIN TOTAL g/dL 6.1* 6.2*   ALT U/L 9 8   AST U/L 34 16   ALBUMIN g/dL 2.8* 2.7*       Estimated Creatinine Clearance: 22.3 mL/min (A) (by C-G formula based on SCr of 2.43 mg/dL (H)).  C-Reactive Protein   Date Value Ref Range Status   04/03/2024 2.55 (H) <1.00 mg/dL Final     Microbiology  Reviewed  Imaging  Reviewed        Assessment/Plan   Right heel none healing wound / possible infection, the MRI was reviewed, no bony erosions sp bedside debridement   Healing Lt thoracic zoster     Recommendations :  Continue Cefazolin, plan on a short course of oral antibiotics with discharge  Discussed with the wound care team     I spent minutes in the professional and overall care of this patient.      Christopher Sharp MD

## 2024-04-08 LAB
ALBUMIN SERPL BCP-MCNC: 2.6 G/DL (ref 3.4–5)
ALP SERPL-CCNC: 53 U/L (ref 33–136)
ALT SERPL W P-5'-P-CCNC: 5 U/L (ref 7–45)
ANION GAP SERPL CALC-SCNC: 11 MMOL/L (ref 10–20)
AST SERPL W P-5'-P-CCNC: 31 U/L (ref 9–39)
BILIRUB SERPL-MCNC: 0.3 MG/DL (ref 0–1.2)
BUN SERPL-MCNC: 27 MG/DL (ref 6–23)
CALCIUM SERPL-MCNC: 8.8 MG/DL (ref 8.6–10.3)
CHLORIDE SERPL-SCNC: 102 MMOL/L (ref 98–107)
CO2 SERPL-SCNC: 29 MMOL/L (ref 21–32)
CREAT SERPL-MCNC: 2.8 MG/DL (ref 0.5–1.05)
EGFRCR SERPLBLD CKD-EPI 2021: 17 ML/MIN/1.73M*2
ERYTHROCYTE [DISTWIDTH] IN BLOOD BY AUTOMATED COUNT: 13 % (ref 11.5–14.5)
GLUCOSE SERPL-MCNC: 80 MG/DL (ref 74–99)
HCT VFR BLD AUTO: 33.1 % (ref 36–46)
HGB BLD-MCNC: 10.4 G/DL (ref 12–16)
MCH RBC QN AUTO: 30.4 PG (ref 26–34)
MCHC RBC AUTO-ENTMCNC: 31.4 G/DL (ref 32–36)
MCV RBC AUTO: 97 FL (ref 80–100)
NRBC BLD-RTO: 0 /100 WBCS (ref 0–0)
OSMOLALITY SERPL: 293 MOSM/KG (ref 280–300)
OSMOLALITY UR: 403 MOSM/KG (ref 200–1200)
PLATELET # BLD AUTO: 245 X10*3/UL (ref 150–450)
POTASSIUM SERPL-SCNC: 3.8 MMOL/L (ref 3.5–5.3)
PROT SERPL-MCNC: 5.5 G/DL (ref 6.4–8.2)
RBC # BLD AUTO: 3.42 X10*6/UL (ref 4–5.2)
SODIUM SERPL-SCNC: 138 MMOL/L (ref 136–145)
WBC # BLD AUTO: 6.6 X10*3/UL (ref 4.4–11.3)

## 2024-04-08 PROCEDURE — 85027 COMPLETE CBC AUTOMATED: CPT

## 2024-04-08 PROCEDURE — 2500000004 HC RX 250 GENERAL PHARMACY W/ HCPCS (ALT 636 FOR OP/ED)

## 2024-04-08 PROCEDURE — 83930 ASSAY OF BLOOD OSMOLALITY: CPT | Mod: GEALAB

## 2024-04-08 PROCEDURE — 2500000001 HC RX 250 WO HCPCS SELF ADMINISTERED DRUGS (ALT 637 FOR MEDICARE OP): Performed by: PODIATRIST

## 2024-04-08 PROCEDURE — 83935 ASSAY OF URINE OSMOLALITY: CPT | Mod: GEALAB

## 2024-04-08 PROCEDURE — 1100000001 HC PRIVATE ROOM DAILY

## 2024-04-08 PROCEDURE — 2500000001 HC RX 250 WO HCPCS SELF ADMINISTERED DRUGS (ALT 637 FOR MEDICARE OP)

## 2024-04-08 PROCEDURE — 99232 SBSQ HOSP IP/OBS MODERATE 35: CPT

## 2024-04-08 PROCEDURE — 97530 THERAPEUTIC ACTIVITIES: CPT | Mod: GP,CQ

## 2024-04-08 PROCEDURE — 97110 THERAPEUTIC EXERCISES: CPT | Mod: GP,CQ

## 2024-04-08 PROCEDURE — 82436 ASSAY OF URINE CHLORIDE: CPT | Mod: GEALAB

## 2024-04-08 PROCEDURE — 80053 COMPREHEN METABOLIC PANEL: CPT | Performed by: INTERNAL MEDICINE

## 2024-04-08 PROCEDURE — 36415 COLL VENOUS BLD VENIPUNCTURE: CPT

## 2024-04-08 PROCEDURE — 2500000004 HC RX 250 GENERAL PHARMACY W/ HCPCS (ALT 636 FOR OP/ED): Mod: JZ

## 2024-04-08 PROCEDURE — 2500000002 HC RX 250 W HCPCS SELF ADMINISTERED DRUGS (ALT 637 FOR MEDICARE OP, ALT 636 FOR OP/ED)

## 2024-04-08 RX ADMIN — FAMOTIDINE 10 MG: 20 TABLET ORAL at 09:42

## 2024-04-08 RX ADMIN — FEBUXOSTAT 40 MG: 40 TABLET ORAL at 09:42

## 2024-04-08 RX ADMIN — HEPARIN SODIUM 7500 UNITS: 5000 INJECTION INTRAVENOUS; SUBCUTANEOUS at 21:32

## 2024-04-08 RX ADMIN — LEVOTHYROXINE SODIUM 75 MCG: 75 TABLET ORAL at 05:48

## 2024-04-08 RX ADMIN — FUROSEMIDE 40 MG: 40 TABLET ORAL at 09:41

## 2024-04-08 RX ADMIN — COLLAGENASE SANTYL: 250 OINTMENT TOPICAL at 09:00

## 2024-04-08 RX ADMIN — HEPARIN SODIUM 7500 UNITS: 5000 INJECTION INTRAVENOUS; SUBCUTANEOUS at 13:48

## 2024-04-08 RX ADMIN — FERROUS GLUCONATE 324 MG: 324 TABLET ORAL at 21:25

## 2024-04-08 RX ADMIN — HEPARIN SODIUM 7500 UNITS: 5000 INJECTION INTRAVENOUS; SUBCUTANEOUS at 05:48

## 2024-04-08 RX ADMIN — ATORVASTATIN CALCIUM 20 MG: 20 TABLET, FILM COATED ORAL at 21:25

## 2024-04-08 RX ADMIN — OXYBUTYNIN CHLORIDE 5 MG: 5 TABLET ORAL at 21:25

## 2024-04-08 RX ADMIN — FERROUS GLUCONATE 324 MG: 324 TABLET ORAL at 09:42

## 2024-04-08 RX ADMIN — FLUTICASONE FUROATE AND VILANTEROL TRIFENATATE 1 PUFF: 100; 25 POWDER RESPIRATORY (INHALATION) at 06:00

## 2024-04-08 RX ADMIN — CEFAZOLIN SODIUM 1 G: 1 INJECTION, SOLUTION INTRAVENOUS at 08:18

## 2024-04-08 RX ADMIN — CEFAZOLIN SODIUM 1 G: 1 INJECTION, SOLUTION INTRAVENOUS at 21:26

## 2024-04-08 ASSESSMENT — COGNITIVE AND FUNCTIONAL STATUS - GENERAL
HELP NEEDED FOR BATHING: A LITTLE
STANDING UP FROM CHAIR USING ARMS: A LITTLE
WALKING IN HOSPITAL ROOM: A LOT
TURNING FROM BACK TO SIDE WHILE IN FLAT BAD: A LITTLE
STANDING UP FROM CHAIR USING ARMS: A LITTLE
TURNING FROM BACK TO SIDE WHILE IN FLAT BAD: A LITTLE
DAILY ACTIVITIY SCORE: 19
MOVING FROM LYING ON BACK TO SITTING ON SIDE OF FLAT BED WITH BEDRAILS: A LITTLE
MOBILITY SCORE: 16
MOVING FROM LYING ON BACK TO SITTING ON SIDE OF FLAT BED WITH BEDRAILS: A LITTLE
MOVING TO AND FROM BED TO CHAIR: A LITTLE
CLIMB 3 TO 5 STEPS WITH RAILING: A LOT
MOVING TO AND FROM BED TO CHAIR: A LITTLE
WALKING IN HOSPITAL ROOM: A LOT
MOBILITY SCORE: 16
TOILETING: A LITTLE
DRESSING REGULAR LOWER BODY CLOTHING: A LITTLE
DRESSING REGULAR UPPER BODY CLOTHING: A LITTLE
PERSONAL GROOMING: A LITTLE
CLIMB 3 TO 5 STEPS WITH RAILING: A LOT

## 2024-04-08 ASSESSMENT — PAIN - FUNCTIONAL ASSESSMENT
PAIN_FUNCTIONAL_ASSESSMENT: 0-10
PAIN_FUNCTIONAL_ASSESSMENT: 0-10

## 2024-04-08 ASSESSMENT — PAIN SCALES - GENERAL
PAINLEVEL_OUTOF10: 0 - NO PAIN
PAINLEVEL_OUTOF10: 0 - NO PAIN

## 2024-04-08 NOTE — PROGRESS NOTES
04/08/24 1046   Discharge Planning   Living Arrangements Spouse/significant other   Support Systems Spouse/significant other   Assistance Needed Patient is A&Ox3, needs assistance from spouse with ADL's, he reports she is unable to bear weight on R foot due to wound, he will stand pivot her to wheelchair, she has a walker as well in the home, split level home with a chair lift, does not drive   Type of Residence Private residence   Home or Post Acute Services Post acute facilities (Rehab/SNF/etc)   Type of Post Acute Facility Services Skilled nursing   Patient expects to be discharged to: new SNF- patient reviewing PAQN list   Does the patient need discharge transport arranged? Yes   RoundTrip coordination needed? Yes   Has discharge transport been arranged? No     4/8/2024 1133: Referral sent to [facility] at patient and spouse request- will follow in [facility] for acceptance.     4/8/2024 1445: Spoke to patient at bedside- [facility] cannot accept due to no beds. Patient unwilling to make any additional choices until she speaks to her insurance.

## 2024-04-08 NOTE — PROGRESS NOTES
Physical Therapy    Physical Therapy Treatment    Patient Name: Magi Boyd  MRN: [id number]  Today's Date: 4/8/2024  Time Calculation  Start Time: 1032  Stop Time: 1112  Time Calculation (min): 40 min       Assessment/Plan   PT Assessment  PT Assessment Results: Decreased strength  Rehab Prognosis: Good  End of Session Communication: Bedside nurse  Assessment Comment: Patient seated in chair position in bed  End of Session Patient Position: Bed, 3 rail up, Alarm on     PT Plan  Treatment/Interventions: Bed mobility, Transfer training, Gait training, Balance training, Strengthening, Therapeutic activity, Therapeutic exercise, Home exercise program, Orthotic fitting/training  PT Plan: Skilled PT  PT Frequency: 3 times per week  PT Discharge Recommendations: Moderate intensity level of continued care  PT Recommended Transfer Status: Assist x1  PT - OK to Discharge: Yes (per PT POC)      General Visit Information:   PT  Visit  PT Received On: 04/08/24  Response to Previous Treatment: Patient with no complaints from previous session.  General  Reason for Referral: 79 year old female referred to OT for R foot sx, impaired mobility, impaired ADL  Referred By: Mary Tucker MD  Past Medical History Relevant to Rehab: Acute UTI (04/20/2023), Adverse reaction to NSAIDs, sequela (04/20/2023), Benign essential hypertension (04/20/2023), Bilateral leg and foot pain (04/20/2023), Body mass index (BMI)40.0-44.9, adult (10/18/2021), CKD (chronic kidney disease) (04/20/2023), Disorder of both eustachian tubes (04/20/2023), Familial hypercholesteremia (04/20/2023), GERD (gastroesophageal reflux disease) (04/20/2023), Lower urinary tract symptoms (04/20/2023), Obesity, unspecified (08/01/2022), Otalgia, left ear (02/18/2020), Other abnormal glucose (09/29/2016), Other specified abnormal findings of blood chemistry (10/05/2016), Other specified disorders of ear, unspecified ear (03/18/2015), Pelvic and perineal pain  (10/24/2019), Personal history of other diseases of the respiratory system (12/05/2013), Personal history of other diseases of the respiratory system (03/18/2015), Personal history of other diseases of the respiratory system (12/17/2019), Personal history of other drug therapy (10/25/2019), Personal history of other endocrine, nutritional and metabolic disease, Personal history of other specified conditions (09/26/2013), Personal history of other specified conditions (10/29/2013), Personal history of other specified conditions (02/18/2020), Personal history of other specified conditions (02/18/2020), Personal history of other specified conditions (02/09/2015), Personal history of urinary (tract) infections (12/18/2018), Persons encountering health services in other specified circumstances (10/25/2019), Right knee pain (04/20/2023), Simple chronic bronchitis (CMS/HCC) (08/23/2023), SOB (shortness of breath) (08/23/2023), Toe pain (08/23/2023), Unspecified abnormal findings in urine (08/25/2021), Urinary tract infection, site not specified (10/18/2021), and Urinary tract infection, site not specified (08/30/2022).  Family/Caregiver Present: No  Prior to Session Communication: Bedside nurse  Patient Position Received: Bed, 3 rail up, Alarm on  General Comment: Patient pleasant and cooperative.IV,  Purewick in place. RLE walking boot for WBing.    Subjective   Precautions:  Precautions  LE Weight Bearing Status: Weight Bearing as Tolerated  Medical Precautions: Fall precautions (R boot donned for WB)  IV R arm/PUREWICK/BP cuff R arm.  Vital Signs:  Vital Signs  Heart Rate Source: Monitor  BP: 132/74  BP Location: Right arm  BP Method: Automatic  Patient Position: Sitting    Objective   Pain:  Pain Assessment  Pain Assessment: 0-10  Pain Score: 0 - No pain  Cognition:  Cognition  Overall Cognitive Status: Within Functional Limits  Orientation Level: Oriented X4  Postural Control:     Extremity/Trunk  Assessments:    Activity Tolerance:  Activity Tolerance  Endurance: Tolerates 10 - 20 min exercise with multiple rests  Treatments:  Therapeutic Exercise  Therapeutic Exercise Performed: Yes  Therapeutic Exercise Activity 1: Seated ther ex x 10-15 each B LE (AP (L LE only), LAQ, HS, Abd/Add, hip flex)              Bed Mobility  Bed Mobility: Yes  Bed Mobility 1  Bed Mobility 1: Supine to sitting, Sitting to supine  Level of Assistance 1: Minimum assistance (Assist to transfer B LE from Bed<>floor)  Bed Mobility Comments 1: HOB elevated intially, HOB lowered for patient to come to upright sitting position. Extended time required.    Ambulation/Gait Training  Ambulation/Gait Training Performed: Yes  Ambulation/Gait Training 1  Surface 1: Level tile  Device 1: Rolling walker  Assistance 1: Minimum assistance, Contact guard  Quality of Gait 1: Shuffling gait, Decreased step length, Inconsistent stride length  Comments/Distance (ft) 1: Patient side stepping to [facility] 4 steps x1, 3 x 1steps second trial.  Transfers  Transfer: Yes  Transfer 1  Transfer From 1: Bed to  Transfer to 1: Sit, Stand  Transfer Device 1: Walker  Transfer Level of Assistance 1: Minimum assistance  Trials/Comments 1: Bed raised slightly. Patient counts to three and is up on three. (Extended time to complete each Sit<>Stand. Multiple trials throughout session.)    Other Activity  Other Activity Performed: Yes  Other Activity 1: Static standing trials. x 3. max time 2 min    Outcome Measures:  [facility] Basic Mobility  Turning from your back to your side while in a flat bed without using bedrails: A little  Moving from lying on your back to sitting on the side of a flat bed without using bedrails: A little  Moving to and from bed to chair (including a wheelchair): A little  Standing up from a chair using your arms (e.g. wheelchair or bedside chair): A little  To walk in hospital room: A lot  Climbing 3-5 steps with railing: A lot  Basic Mobility - Total  Score: 16    Education Documentation  Body Mechanics, taught by Ainsley Rodrigez PTA at 4/8/2024 11:33 AM.  Learner: Patient  Readiness: Acceptance  Method: Demonstration, Explanation  Response: Verbalizes Understanding, Needs Reinforcement, Demonstrated Understanding    Home Exercise Program, taught by Ainsley Rodrigez PTA at 4/8/2024 11:33 AM.  Learner: Patient  Readiness: Acceptance  Method: Demonstration, Explanation  Response: Verbalizes Understanding, Needs Reinforcement, Demonstrated Understanding    Precautions, taught by Ainsley Rodrigez PTA at 4/8/2024 11:33 AM.  Learner: Patient  Readiness: Acceptance  Method: Demonstration, Explanation  Response: Verbalizes Understanding, Needs Reinforcement, Demonstrated Understanding    Mobility Training, taught by Ainsley Rodrigez PTA at 4/8/2024 11:33 AM.  Learner: Patient  Readiness: Acceptance  Method: Demonstration, Explanation  Response: Verbalizes Understanding, Needs Reinforcement, Demonstrated Understanding    Education Comments  No comments found.        OP EDUCATION:       Encounter Problems       Encounter Problems (Active)       Mobility       STG - Patient will ambulate 50ft with FWW       Start:  04/06/24    Expected End:  04/20/24            Pt will complete 15 reps of BLE therex to improve strength       Start:  04/06/24    Expected End:  04/20/24               PT Transfers       STG - Patient will perform bed mobility Kelly       Start:  04/06/24    Expected End:  04/20/24            STG - Patient will transfer sit to and from stand with FWW with SBA       Start:  04/06/24    Expected End:  04/20/24               Safety       Pt will independently don/doff CAM boot       Start:  04/06/24    Expected End:  04/20/24

## 2024-04-08 NOTE — PROGRESS NOTES
Subjective    Magi Boyd is a 79 y.o. female presenting for infected pressure ulcer. Pt doing well, no pain, difficult to ambulate d/t lightheadedness. ON, patient had episode of hypotension to the mid-80s treated with 500cc LR. Pressure responded and was monitored over the course of the day.    Objective    Physical Exam  Constitutional:       General: She is not in acute distress.     Appearance: Normal appearance. She is obese. She is not ill-appearing.   HENT:      Head: Normocephalic and atraumatic.      Nose: Nose normal.      Mouth/Throat:      Mouth: Mucous membranes are moist.      Pharynx: Oropharynx is clear.   Cardiovascular:      Rate and Rhythm: Normal rate and regular rhythm.      Heart sounds: Normal heart sounds. No murmur heard.     No friction rub. No gallop.   Pulmonary:      Effort: Pulmonary effort is normal. No respiratory distress.      Breath sounds: Normal breath sounds. No wheezing, rhonchi or rales.   Abdominal:      General: Abdomen is flat. Bowel sounds are normal. There is no distension.      Palpations: Abdomen is soft.      Tenderness: There is no abdominal tenderness. There is no guarding or rebound.   Musculoskeletal:      Right lower leg: Edema present.      Left lower leg: Edema present.      Comments: RLE in boot, dressed and wrapped.   Skin:     General: Skin is warm and dry.      Capillary Refill: Capillary refill takes 2 to 3 seconds.   Neurological:      General: No focal deficit present.      Mental Status: She is alert and oriented to person, place, and time.     Temp:  [36.2 °C (97.2 °F)-36.8 °C (98.2 °F)] 36.7 °C (98.1 °F)  Heart Rate:  [59-67] 65  Resp:  [16-22] 20  BP: ()/(48-74) 132/74    Vitals:    04/03/24 1610   Weight: 104 kg (229 lb)     I/Os    Intake/Output Summary (Last 24 hours) at 4/8/2024 1138  Last data filed at 4/8/2024 0848  Gross per 24 hour   Intake 1110 ml   Output 800 ml   Net 310 ml     Labs:   Results from last 72 hours   Lab Units  04/08/24  0612 04/07/24  0632 04/06/24  0642   SODIUM mmol/L 138 138 137   POTASSIUM mmol/L 3.8 3.4* 3.5   CHLORIDE mmol/L 102 101 102   CO2 mmol/L 29 28 27   BUN mg/dL 27* 25* 27*   CREATININE mg/dL 2.80* 2.43* 2.30*   GLUCOSE mg/dL 80 88 87   CALCIUM mg/dL 8.8 9.0 9.1   ANION GAP mmol/L 11 12 12   EGFR mL/min/1.73m*2 17* 20* 21*      Results from last 72 hours   Lab Units 04/08/24  0612 04/07/24  0632 04/06/24  0642   WBC AUTO x10*3/uL 6.6 5.8 4.9   HEMOGLOBIN g/dL 10.4* 11.1* 10.8*   HEMATOCRIT % 33.1* 34.4* 33.4*   PLATELETS AUTO x10*3/uL 245 250 243      Lab Results   Component Value Date    CALCIUM 8.8 04/08/2024    PHOS 3.3 02/02/2024      Lab Results   Component Value Date    CRP 2.55 (H) 04/03/2024      Micro/ID:   Susceptibility data from last 90 days.  Collected Specimen Info Organism Amoxicillin/Clavulanate Ampicillin Ampicillin/Sulbactam Cefazolin Cefepime Ciprofloxacin Gentamicin Nitrofurantoin Piperacillin/Tazobactam Trimethoprim/Sulfamethoxazole   04/06/24 Tissue/Biopsy from Wound/Tissue Mixed Skin Microorganisms              03/14/24 Urine from Clean Catch/Voided Enterobacter cloacae complex R R R R S S S S S R     Lab Results   Component Value Date    URINECULTURE >100,000 Enterobacter cloacae complex (A) 03/14/2024     Images:  Vascular US lower extremity venous duplex bilateral            [facility]  [id number] [place] [address]   Tel [phone] and Fax [phone]       Vascular Lab Report  [facility]OWER EXTREM[facility]EN[facility]UP[facility] BILATERAL       Patient Name:      CHANDNIJOYCE Khalil Physician: [id number] Dayday De La Rosa DO  Study Date:        4/4/2024            Ordering           [id number] BERNIE MCDANIEL                                         Physician:         AMANDA  MRN/PID:           [id number]            Technologist:      Laila Aleman RVT  Accession#:        [id number]        Technologist 2:  Date of Birth/Age: 1945 / 79      Encounter#:        [id number]                      years  Gender:            F  Admission Status:  Inpatient           Location           [facility]                                         Performed:       Diagnosis/ICD: Localized (leg) edema-R60.0  CPT Codes:     91845 Peripheral venous duplex scan for DVT complete       CONCLUSIONS:  Right Lower Venous: No evidence of acute deep vein thrombus visualized in the right lower extremity. Cannot rule out thrombus in non-visualized Peroneal vein due to edema. Right posterior tibial veins were visualized in segments due to edema; cannot rule out DVT in non-visualized segments.  Left Lower Venous: No evidence of acute deep vein thrombus visualized in the left lower extremity. Cannot rule out thrombus in non-visualized peroneal vein. Left posterior tibial veins were visualized in segments due to edema; cannot rule out DVT in non-visualized segments.     Imaging & Doppler Findings:     Right                 Compressible Thrombus        Flow  Distal External Iliac     Yes        None   Spontaneous/Phasic  CFV                       Yes        None   Spontaneous/Phasic  PFV                       Yes        None  FV Proximal               Yes        None   Spontaneous/Phasic  FV Mid                    Yes        None  FV Distal                 Yes        None  Popliteal                 Yes        None   Spontaneous/Phasic       Left                  Compress Thrombus        Flow  Distal External Iliac   Yes      None   Spontaneous/Phasic  CFV                     Yes      None   Spontaneous/Phasic  PFV                     Yes      None  FV Proximal             Yes      None   Spontaneous/Phasic  FV Mid                  Yes      None  FV Distal               Yes      None  Popliteal               Yes      None   Spontaneous/Phasic  PTV                     Yes      None       [id number] Dayday De La Rosa DO  Electronically signed by [id number] Dayday De La Rosa DO on 4/4/2024 at 4:20:01 PM       ** Final **  MR foot right wo IV  contrast, MR ankle right wo IV contrast  Narrative: Interpreted By:  Imelda David,   STUDY:  MRI of the right lower extremity without IV contrast;      INDICATION:  Signs/Symptoms:concern for OM of R foot; Signs/Symptoms:r/out  osteomyelitis      COMPARISON:  Right foot radiographs dated 04/03/2024      ACCESSION NUMBER(S):  [id number]; [id number]      ORDERING CLINICIAN:  HAKAN BURNETTE      TECHNIQUE:  Multiplanar multisequence MRI of the  right lower extremity was  performed  without IV contrast.      FINDINGS:  Soft tissues: There is a plantar soft tissue ulceration at the level  of heel measuring a proximally 3 x 2 cm in AP and transverse  dimensions respectively. There is diffuse subcutaneous soft tissue  edema throughout the plantar soft tissues of the foot extending  distally to the level of forefoot. This is suggestive of cellulitis.  There is also diffuse circumferential subcutaneous soft tissue edema  extending throughout the included portion of distal leg above the  level of ankle joint and extends distally along the dorsal lateral  aspect of the foot. This is a nonspecific finding and could represent  cellulitis versus venous stasis versus lymphedema. Evaluation for  abscess is limited due to noncontrast technique. Within this  limitation, there is no confluence fluid collection subjacent to the  site of ulceration to definitively suggest an abscess. There is  effacement of normal fat signal in the subcutaneous soft tissues at  the level of ulceration extending deep to the level of the plantar  fascia/posterior calcaneal tuberosity.      There is small volume fluid within the peroneal compartment tendon  sheath suggestive of mild tenosynovitis. The evaluation of tendon  morphology is limited due to mild motion artifacts on axial  sequences. The flexor and extensor compartment tendons appear grossly  unremarkable. There is mild patchy T2 hyperintense signal in the  plantar foot  musculature with mild fatty atrophy on T1 weighted  sequences. This is most likely favored to represent diabetic ischemic  myopathy. No evidence of intramuscular fluid collections within  limits of noncontrast technique. There is small plantar calcaneal  enthesophyte with mild thickening of the central band of plantar  fascia suggestive of chronic plantar fasciitis. No abnormal signal is  noted in the plantar fascia within limits of evaluation.      Bones:  There is effacement of normal fat signal in the plantar soft  tissues at the level of posterior calcaneal tuberosity adjacent to  the site of ulceration (as seen on sagittal image 13/32). There is  very faint bone marrow edema along the plantar cortex of posterior  calcaneal tuberosity subjacent to the level of ulceration. However no  evidence of confluence loss of normal fat signal to suggest  osteomyelitis. There are mild degenerative changes at the midfoot  articulations, more pronounced at 1st through 5th tarsometatarsal  joints with mild subchondral cystic changes. Tibiotalar joint  articulation is maintained and demonstrates mild-to-moderate  degenerative changes with trace tibiotalar joint effusion. Otherwise  rest of the osseous structures appear grossly unremarkable.      Impression: 1. Plantar heel soft tissue ulceration with subjacent cellulitis and  mild reactive osteitis along the plantar cortex of posterior  calcaneal tuberosity as described above. No definite MRI evidence of  osteomyelitis at this point in time. No evidence of abscess within  limits of noncontrast technique.  2. Mild tenosynovitis of the peroneal compartment tendons in the  retro malleolar groove. This is most likely favored to be reactive,  however infectious tenosynovitis can not be completely excluded.  3. Extensive circumferential subcutaneous soft tissue edema in the  visualized distal leg extending along the dorsal lateral aspect of  the foot is nonspecific finding. This  could be related to cellulitis  versus venous stasis versus lymphedema. Recommend clinical  correlation.  4. Signal abnormality in the plantar foot musculature is most likely  favored to be related to chronic diabetic ischemic myopathy.  Infectious myositis can also be considered in the differential.      MACRO:  None      Signed by: Imelda David 4/4/2024 1:12 PM  Dictation workstation:   [id number]       Meds:  Scheduled medications  atenolol, 100 mg, oral, Daily  atorvastatin, 20 mg, oral, Daily  ceFAZolin, 1 g, intravenous, q12h  collagenase, , Topical, Daily  famotidine, 10 mg, oral, Every other day  febuxostat, 40 mg, oral, Daily  ferrous gluconate, 324 mg, oral, BID  fluticasone furoate-vilanteroL, 1 puff, inhalation, Daily  furosemide, 40 mg, oral, Daily  heparin (porcine), 7,500 Units, subcutaneous, q8h IMANI  levothyroxine, 75 mcg, oral, Daily  oxybutynin, 5 mg, oral, Nightly      Continuous medications     PRN medications  PRN medications: acetaminophen, alum-mag hydroxide-simeth, ondansetron, polyethylene glycol, traMADol     Assessment    Magi Boyd is a 79 y.o. female presenting for malodorous drainage from foot ulcer, treated for cellulitis and pressure ulcer. Hx of gout, HTN, chronic venous insufficiency, CKD 4, hypothyroidism, overactive bladder, and asthma. Found to have varicella infection on admission.    Acute medical issues  # R heel pressure ulcer stage II vs stage III s/p debridement on 4/6  # R heel cellulitis  # peripheral edema improving  - Cefazolin IV, cephalexin for 5 days on discharge  - Weight bearing on R foot with fracture boot per podiatry  - Wound care per podiatry  - Pending SNF    Resolved medical issues  # VZV infection  - Present in single dermatome  - Completed acyclovir treatment    Chronic medical issues  # Gout  - febuxostat 40mg  # Fe defficiency  - Ferrous gluconate 324mg  # Asthma  - Continue home inhalers (breo)  # HTN  - Atenolol 100mg  # HLD  - Atorvastatin  20mg  # CKD 4 with leg swelling  - Furosemide 40mg  - Follow up with nephrology  # Overactive bladder  - Oxybutinin 5mg  # Hypothyroidism  - Levothyroxine 75ug    F PO  E Replete as needed  N Regular diet  G Famotidine  DVT: Sub Q heparin  Abx: Cefazolin  Drips: None  Code status: Full code  NoK:  Primary Emergency Contact: Ventura Boyd    Dispo: 79 F presenting with pressure ulcer and cellulitis s/p debridement 4/6. DC to SNF pending precert.    Christiano Roman MD

## 2024-04-08 NOTE — PROGRESS NOTES
Magi Boyd is a 79 y.o. female on day 5 of admission presenting with Failure of outpatient treatment.    Subjective   Interval History: no fever, no new complaints        Review of Systems    Objective   Range of Vitals (last 24 hours)  Heart Rate:  [59-67]   Temp:  [36.2 °C (97.2 °F)-36.8 °C (98.2 °F)]   Resp:  [16-22]   BP: ()/(48-69)   SpO2:  [92 %-95 %]   Daily Weight  04/03/24 : 104 kg (229 lb)    Body mass index is 40.57 kg/m².    Physical Exam  Constitutional:       Appearance: Normal appearance.   HENT:      Head: Normocephalic and atraumatic.      Mouth/Throat:      Mouth: Mucous membranes are moist.      Pharynx: Oropharynx is clear.   Eyes:      Pupils: Pupils are equal, round, and reactive to light.   Cardiovascular:      Rate and Rhythm: Normal rate and regular rhythm.      Heart sounds: Normal heart sounds.   Pulmonary:      Effort: Pulmonary effort is normal.      Breath sounds: Normal breath sounds.   Abdominal:      General: Abdomen is flat. Bowel sounds are normal.      Palpations: Abdomen is soft.   Musculoskeletal:      Cervical back: Normal range of motion.      Comments: Stasis  Rt heel wound   Neurological:      Mental Status: She is alert.         Antibiotics  cefepime (Maxipime) IV 2 g  vancomycin (Vancocin) 2000 mg/500 ml in D5W 500 mL IV piggyback 2,000 mg  metroNIDAZOLE (Flagyl) 500 mg in NaCl (iso-os) 100 mL  heparin (porcine) injection 7,500 Units  polyethylene glycol (Glycolax, Miralax) packet 17 g  atenolol (Tenormin) tablet 100 mg  atorvastatin (Lipitor) tablet 20 mg  fluticasone furoate-vilanteroL (Breo Ellipta) 100-25 mcg/dose inhaler 1 puff  famotidine-Ca carb-mag hydrox (Pepcid Complete) -165 mg chewable tablet 1 tablet  febuxostat (Uloric) tablet 40 mg  ferrous gluconate (Fergon) 324 (38 Fe) mg tablet 324 mg  furosemide (Lasix) tablet 40 mg  levothyroxine (Synthroid, Levoxyl) tablet 75 mcg  oxybutynin (Ditropan) tablet 5 mg  solifenacin (VESIcare) tablet 5  mg  traMADol (Ultram) tablet  famotidine (Pepcid) tablet 10 mg  alum-mag hydroxide-simeth (Mylanta) 200-200-20 mg/5 mL oral suspension 30 mL  cefepime (Maxipime) 1 g in dextrose 5% 50 mL IV  vancomycin (Vancocin) pharmacy to dose - pharmacy monitoring  acyclovir (Zovirax) capsule 400 mg  traMADol (Ultram) tablet 50 mg  ceFAZolin in dextrose (iso-os) (Ancef) IVPB 1 g      Relevant Results  Labs  Results from last 72 hours   Lab Units 04/08/24  0612 04/07/24  0632 04/06/24  0642   WBC AUTO x10*3/uL 6.6 5.8 4.9   HEMOGLOBIN g/dL 10.4* 11.1* 10.8*   HEMATOCRIT % 33.1* 34.4* 33.4*   PLATELETS AUTO x10*3/uL 245 250 243       Results from last 72 hours   Lab Units 04/08/24  0612 04/07/24  0632 04/06/24  0642   SODIUM mmol/L 138 138 137   POTASSIUM mmol/L 3.8 3.4* 3.5   CHLORIDE mmol/L 102 101 102   CO2 mmol/L 29 28 27   BUN mg/dL 27* 25* 27*   CREATININE mg/dL 2.80* 2.43* 2.30*   GLUCOSE mg/dL 80 88 87   CALCIUM mg/dL 8.8 9.0 9.1   ANION GAP mmol/L 11 12 12   EGFR mL/min/1.73m*2 17* 20* 21*       Results from last 72 hours   Lab Units 04/08/24  0612 04/07/24  0632 04/06/24  0642   ALK PHOS U/L 53 55 54   BILIRUBIN TOTAL mg/dL 0.3 0.3 0.4   PROTEIN TOTAL g/dL 5.5* 6.1* 6.2*   ALT U/L 5* 9 8   AST U/L 31 34 16   ALBUMIN g/dL 2.6* 2.8* 2.7*       Estimated Creatinine Clearance: 19.4 mL/min (A) (by C-G formula based on SCr of 2.8 mg/dL (H)).  C-Reactive Protein   Date Value Ref Range Status   04/03/2024 2.55 (H) <1.00 mg/dL Final     Microbiology  Reviewed  Imaging  Reviewed        Assessment/Plan   Right heel none healing wound / possible infection, the MRI was reviewed, no bony erosions sp bedside debridement, the culture with rare mixed skin monisha  Healing Lt thoracic zoster     Recommendations :  Continue Cefazolin, plan on a short course of oral Keflex x 5 days with discharge  Discussed with the wound care team     I spent minutes in the professional and overall care of this patient.      Christopher Sharp MD

## 2024-04-09 ENCOUNTER — APPOINTMENT (OUTPATIENT)
Dept: CARDIOLOGY | Facility: HOSPITAL | Age: 79
DRG: 571 | End: 2024-04-09
Payer: MEDICARE

## 2024-04-09 ENCOUNTER — APPOINTMENT (OUTPATIENT)
Dept: RADIOLOGY | Facility: HOSPITAL | Age: 79
DRG: 571 | End: 2024-04-09
Payer: MEDICARE

## 2024-04-09 LAB
ALBUMIN SERPL BCP-MCNC: 2.6 G/DL (ref 3.4–5)
ANION GAP SERPL CALC-SCNC: 12 MMOL/L (ref 10–20)
AORTIC VALVE MEAN GRADIENT: 3.8 MMHG
AORTIC VALVE PEAK VELOCITY: 1.28 M/S
AV PEAK GRADIENT: 6.5 MMHG
AVA (PEAK VEL): 2.74 CM2
AVA (VTI): 2.88 CM2
BACTERIA SPEC CULT: ABNORMAL
BODY SURFACE AREA: 2.15 M2
BUN SERPL-MCNC: 30 MG/DL (ref 6–23)
CALCIUM SERPL-MCNC: 9.2 MG/DL (ref 8.6–10.3)
CHLORIDE SERPL-SCNC: 102 MMOL/L (ref 98–107)
CHLORIDE UR-SCNC: 69 MMOL/L
CHLORIDE/CREATININE (MMOL/G) IN URINE: 43 MMOL/G CREAT (ref 38–318)
CO2 SERPL-SCNC: 27 MMOL/L (ref 21–32)
CREAT SERPL-MCNC: 3.25 MG/DL (ref 0.5–1.05)
CREAT UR-MCNC: 159.9 MG/DL (ref 20–320)
EGFRCR SERPLBLD CKD-EPI 2021: 14 ML/MIN/1.73M*2
EJECTION FRACTION APICAL 4 CHAMBER: 50.1
ERYTHROCYTE [DISTWIDTH] IN BLOOD BY AUTOMATED COUNT: 13.1 % (ref 11.5–14.5)
GLUCOSE SERPL-MCNC: 84 MG/DL (ref 74–99)
GRAM STN SPEC: ABNORMAL
GRAM STN SPEC: ABNORMAL
HCT VFR BLD AUTO: 32.6 % (ref 36–46)
HGB BLD-MCNC: 10.1 G/DL (ref 12–16)
LEFT ATRIUM VOLUME AREA LENGTH INDEX BSA: 18 ML/M2
LEFT VENTRICLE INTERNAL DIMENSION DIASTOLE: 5.25 CM (ref 3.5–6)
LEFT VENTRICULAR OUTFLOW TRACT DIAMETER: 1.9 CM
LV EJECTION FRACTION BIPLANE: 52 %
MAGNESIUM SERPL-MCNC: 1.74 MG/DL (ref 1.6–2.4)
MCH RBC QN AUTO: 30 PG (ref 26–34)
MCHC RBC AUTO-ENTMCNC: 31 G/DL (ref 32–36)
MCV RBC AUTO: 97 FL (ref 80–100)
MITRAL VALVE E/A RATIO: 0.88
MITRAL VALVE E/E' RATIO: 7.13
NRBC BLD-RTO: 0 /100 WBCS (ref 0–0)
PHOSPHATE SERPL-MCNC: 3.2 MG/DL (ref 2.5–4.9)
PLATELET # BLD AUTO: 225 X10*3/UL (ref 150–450)
POTASSIUM SERPL-SCNC: 3.6 MMOL/L (ref 3.5–5.3)
POTASSIUM UR-SCNC: 29 MMOL/L
POTASSIUM/CREAT UR-RTO: 18 MMOL/G CREAT
RBC # BLD AUTO: 3.37 X10*6/UL (ref 4–5.2)
RIGHT VENTRICLE FREE WALL PEAK S': 12 CM/S
RIGHT VENTRICLE PEAK SYSTOLIC PRESSURE: 26 MMHG
SODIUM SERPL-SCNC: 137 MMOL/L (ref 136–145)
SODIUM UR-SCNC: 77 MMOL/L
SODIUM/CREAT UR-RTO: 48 MMOL/G CREAT
TRICUSPID ANNULAR PLANE SYSTOLIC EXCURSION: 2.4 CM
WBC # BLD AUTO: 6 X10*3/UL (ref 4.4–11.3)

## 2024-04-09 PROCEDURE — 83935 ASSAY OF URINE OSMOLALITY: CPT | Mod: GEALAB

## 2024-04-09 PROCEDURE — 2500000004 HC RX 250 GENERAL PHARMACY W/ HCPCS (ALT 636 FOR OP/ED): Mod: JZ

## 2024-04-09 PROCEDURE — 97535 SELF CARE MNGMENT TRAINING: CPT | Mod: GO

## 2024-04-09 PROCEDURE — 99232 SBSQ HOSP IP/OBS MODERATE 35: CPT | Performed by: STUDENT IN AN ORGANIZED HEALTH CARE EDUCATION/TRAINING PROGRAM

## 2024-04-09 PROCEDURE — 85027 COMPLETE CBC AUTOMATED: CPT

## 2024-04-09 PROCEDURE — 83735 ASSAY OF MAGNESIUM: CPT

## 2024-04-09 PROCEDURE — 2500000001 HC RX 250 WO HCPCS SELF ADMINISTERED DRUGS (ALT 637 FOR MEDICARE OP)

## 2024-04-09 PROCEDURE — 76770 US EXAM ABDO BACK WALL COMP: CPT

## 2024-04-09 PROCEDURE — 2500000004 HC RX 250 GENERAL PHARMACY W/ HCPCS (ALT 636 FOR OP/ED)

## 2024-04-09 PROCEDURE — 82043 UR ALBUMIN QUANTITATIVE: CPT | Mod: GEALAB

## 2024-04-09 PROCEDURE — 80069 RENAL FUNCTION PANEL: CPT

## 2024-04-09 PROCEDURE — 76770 US EXAM ABDO BACK WALL COMP: CPT | Performed by: RADIOLOGY

## 2024-04-09 PROCEDURE — 2500000002 HC RX 250 W HCPCS SELF ADMINISTERED DRUGS (ALT 637 FOR MEDICARE OP, ALT 636 FOR OP/ED)

## 2024-04-09 PROCEDURE — 1100000001 HC PRIVATE ROOM DAILY

## 2024-04-09 PROCEDURE — 93306 TTE W/DOPPLER COMPLETE: CPT

## 2024-04-09 PROCEDURE — 2500000001 HC RX 250 WO HCPCS SELF ADMINISTERED DRUGS (ALT 637 FOR MEDICARE OP): Performed by: PODIATRIST

## 2024-04-09 PROCEDURE — 36415 COLL VENOUS BLD VENIPUNCTURE: CPT

## 2024-04-09 PROCEDURE — 93306 TTE W/DOPPLER COMPLETE: CPT | Performed by: STUDENT IN AN ORGANIZED HEALTH CARE EDUCATION/TRAINING PROGRAM

## 2024-04-09 PROCEDURE — 97530 THERAPEUTIC ACTIVITIES: CPT | Mod: GO

## 2024-04-09 RX ADMIN — HEPARIN SODIUM 7500 UNITS: 5000 INJECTION INTRAVENOUS; SUBCUTANEOUS at 21:24

## 2024-04-09 RX ADMIN — FERROUS GLUCONATE 324 MG: 324 TABLET ORAL at 08:34

## 2024-04-09 RX ADMIN — HEPARIN SODIUM 7500 UNITS: 5000 INJECTION INTRAVENOUS; SUBCUTANEOUS at 05:27

## 2024-04-09 RX ADMIN — FEBUXOSTAT 40 MG: 40 TABLET ORAL at 08:34

## 2024-04-09 RX ADMIN — CEFAZOLIN SODIUM 1 G: 1 INJECTION, SOLUTION INTRAVENOUS at 08:36

## 2024-04-09 RX ADMIN — SODIUM CHLORIDE, POTASSIUM CHLORIDE, SODIUM LACTATE AND CALCIUM CHLORIDE 500 ML: 600; 310; 30; 20 INJECTION, SOLUTION INTRAVENOUS at 12:37

## 2024-04-09 RX ADMIN — HEPARIN SODIUM 7500 UNITS: 5000 INJECTION INTRAVENOUS; SUBCUTANEOUS at 14:58

## 2024-04-09 RX ADMIN — ATORVASTATIN CALCIUM 20 MG: 20 TABLET, FILM COATED ORAL at 21:24

## 2024-04-09 RX ADMIN — ATENOLOL 100 MG: 50 TABLET ORAL at 21:24

## 2024-04-09 RX ADMIN — OXYBUTYNIN CHLORIDE 5 MG: 5 TABLET ORAL at 21:24

## 2024-04-09 RX ADMIN — CEFAZOLIN SODIUM 1 G: 1 INJECTION, SOLUTION INTRAVENOUS at 21:24

## 2024-04-09 RX ADMIN — FERROUS GLUCONATE 324 MG: 324 TABLET ORAL at 21:24

## 2024-04-09 RX ADMIN — LEVOTHYROXINE SODIUM 75 MCG: 75 TABLET ORAL at 05:27

## 2024-04-09 RX ADMIN — ACETAMINOPHEN 650 MG: 325 TABLET ORAL at 08:34

## 2024-04-09 RX ADMIN — FLUTICASONE FUROATE AND VILANTEROL TRIFENATATE 1 PUFF: 100; 25 POWDER RESPIRATORY (INHALATION) at 08:36

## 2024-04-09 RX ADMIN — COLLAGENASE SANTYL: 250 OINTMENT TOPICAL at 14:58

## 2024-04-09 ASSESSMENT — COGNITIVE AND FUNCTIONAL STATUS - GENERAL
MOVING FROM LYING ON BACK TO SITTING ON SIDE OF FLAT BED WITH BEDRAILS: A LITTLE
MOVING TO AND FROM BED TO CHAIR: A LITTLE
MOVING FROM LYING ON BACK TO SITTING ON SIDE OF FLAT BED WITH BEDRAILS: A LITTLE
DRESSING REGULAR UPPER BODY CLOTHING: A LITTLE
MOVING TO AND FROM BED TO CHAIR: A LITTLE
TURNING FROM BACK TO SIDE WHILE IN FLAT BAD: A LITTLE
TOILETING: A LITTLE
DAILY ACTIVITIY SCORE: 16
HELP NEEDED FOR BATHING: A LOT
PERSONAL GROOMING: A LITTLE
HELP NEEDED FOR BATHING: A LOT
CLIMB 3 TO 5 STEPS WITH RAILING: A LOT
MOBILITY SCORE: 17
STANDING UP FROM CHAIR USING ARMS: A LITTLE
DAILY ACTIVITIY SCORE: 16
TURNING FROM BACK TO SIDE WHILE IN FLAT BAD: A LITTLE
DRESSING REGULAR UPPER BODY CLOTHING: A LITTLE
MOBILITY SCORE: 17
DAILY ACTIVITIY SCORE: 20
DRESSING REGULAR LOWER BODY CLOTHING: A LOT
WALKING IN HOSPITAL ROOM: A LITTLE
STANDING UP FROM CHAIR USING ARMS: A LITTLE
TOILETING: A LOT
WALKING IN HOSPITAL ROOM: A LITTLE
HELP NEEDED FOR BATHING: A LITTLE
PERSONAL GROOMING: A LITTLE
DRESSING REGULAR LOWER BODY CLOTHING: A LOT
DRESSING REGULAR LOWER BODY CLOTHING: A LITTLE
CLIMB 3 TO 5 STEPS WITH RAILING: A LOT
DRESSING REGULAR UPPER BODY CLOTHING: A LITTLE
TOILETING: A LOT

## 2024-04-09 ASSESSMENT — PAIN - FUNCTIONAL ASSESSMENT
PAIN_FUNCTIONAL_ASSESSMENT: 0-10

## 2024-04-09 ASSESSMENT — PAIN SCALES - GENERAL
PAINLEVEL_OUTOF10: 0 - NO PAIN
PAINLEVEL_OUTOF10: 7
PAINLEVEL_OUTOF10: 2
PAINLEVEL_OUTOF10: 5 - MODERATE PAIN
PAINLEVEL_OUTOF10: 0 - NO PAIN

## 2024-04-09 ASSESSMENT — PAIN DESCRIPTION - LOCATION: LOCATION: HEAD

## 2024-04-09 ASSESSMENT — ACTIVITIES OF DAILY LIVING (ADL): HOME_MANAGEMENT_TIME_ENTRY: 13

## 2024-04-09 NOTE — PROGRESS NOTES
04/09/24 1450   Discharge Planning   Living Arrangements Spouse/significant other   Support Systems Spouse/significant other   Assistance Needed Patient is A&Ox3, needs assistance from spouse with ADL's, he reports she is unable to bear weight on R foot due to wound, he will stand pivot her to wheelchair, she has a walker as well in the home, split level home with a chair lift, does not drive   Type of Residence Private residence   Home or Post Acute Services Post acute facilities (Rehab/SNF/etc)   Type of Post Acute Facility Services Skilled nursing   Patient expects to be discharged to: new SNF 1.[facility] 2.[facility] 3.[facility] 4.[facility]   Does the patient need discharge transport arranged? Yes   RoundTrip coordination needed? Yes   Has discharge transport been arranged? No     4/9/2024 1451: Referrals sent in [facility] for SNF as requested by patient. Will follow for acceptance. Patient will require precert prior to SNF transition.

## 2024-04-09 NOTE — NURSING NOTE
Assumed care of patient.      N.O. for urine tests.     1230- bladder scan 71 but patient very tender when pushing on abdomen. Encourage PO intake, including fluids.   Patient has had no urine output since purewick was emptied this AM. MD notified.

## 2024-04-09 NOTE — PROGRESS NOTES
Occupational Therapy    Occupational Therapy Treatment    Name: Magi Boyd  MRN: [id number]  : 1945  Date: 24  Time Calculation  Start Time: 1038  Stop Time: 6  Time Calculation (min): 38 min    Assessment:  OT Assessment: Pt is making fair progress. Pt to continue to benefit from skilled OT services to increase independence with ADL's, transfers, and mobility  Prognosis: Good  Barriers to Discharge: None  Evaluation/Treatment Tolerance: Patient limited by fatigue, Other (Comment) (dizziness/lightheadedness)  Medical Staff Made Aware: Yes  End of Session Communication: Bedside nurse  End of Session Patient Position: Bed, 3 rail up, Alarm on  Plan:  Treatment Interventions: ADL retraining, Functional transfer training, UE strengthening/ROM, Endurance training  OT Frequency: 3 times per week  OT Discharge Recommendations: Moderate intensity level of continued care  Equipment Recommended upon Discharge: Wheeled walker  OT Recommended Transfer Status: Minimal assist, Moderate assist, Assist of 1  OT - OK to Discharge: Yes (per OT POC)    Subjective   Previous Visit Info:  OT Last Visit  OT Received On: 24  General:  General  Reason for Referral: Pt is a 79 year old female referred to OT for R foot sx, impaired mobility, impaired ADL  Past Medical History Relevant to Rehab: Acute UTI (2023), Adverse reaction to NSAIDs, sequela (2023), Benign essential hypertension (2023), Bilateral leg and foot pain (2023), Body mass index (BMI)40.0-44.9, adult (10/18/2021), CKD (chronic kidney disease) (2023), Disorder of both eustachian tubes (2023), Familial hypercholesteremia (2023), GERD (gastroesophageal reflux disease) (2023), Lower urinary tract symptoms (2023), Obesity, unspecified (2022), Otalgia, left ear (2020), Other abnormal glucose (2016), Other specified abnormal findings of blood chemistry (10/05/2016), Other specified  disorders of ear, unspecified ear (03/18/2015), Pelvic and perineal pain (10/24/2019), Personal history of other diseases of the respiratory system (12/05/2013), Personal history of other diseases of the respiratory system (03/18/2015), Personal history of other diseases of the respiratory system (12/17/2019), Personal history of other drug therapy (10/25/2019), Personal history of other endocrine, nutritional and metabolic disease, Personal history of other specified conditions (09/26/2013), Personal history of other specified conditions (10/29/2013), Personal history of other specified conditions (02/18/2020), Personal history of other specified conditions (02/18/2020), Personal history of other specified conditions (02/09/2015), Personal history of urinary (tract) infections (12/18/2018), Persons encountering health services in other specified circumstances (10/25/2019), Right knee pain (04/20/2023), Simple chronic bronchitis (CMS/HCC) (08/23/2023), SOB (shortness of breath) (08/23/2023), Toe pain (08/23/2023), Unspecified abnormal findings in urine (08/25/2021), Urinary tract infection, site not specified (10/18/2021), and Urinary tract infection, site not specified (08/30/2022).  Family/Caregiver Present: No  Prior to Session Communication: Bedside nurse  Patient Position Received: Bed, 3 rail up, Alarm on  General Comment: Pt pleasant and agreeable to OT treatment. Pt did have an episode of dizziness where the patient needed to sit down with cold washcloths  Precautions:  Medical Precautions: Fall precautions (IV, pure-wick)  Precautions Comment: WBAT with walking boot on     Pain Assessment:  Pain Assessment  Pain Assessment: 0-10  Pain Score: 5 - Moderate pain  Pain Type: Acute pain  Pain Location: Head  Pain Interventions: Medication (See MAR)     Objective   Activities of Daily Living: LE Dressing  LE Dressing: Yes  Shoe Level of Assistance: Maximum assistance  Adult Briefs Level of Assistance: Moderate  assistance  LE Dressing Where Assessed: Edge of bed  LE Dressing Comments: Assist to don brief over both LE's. Pt stood with FWW with min-mod A for standing balance as she pulled the brief up over her hips. Assist needed to doff walking boot and don tennis shoe on left foot     Bed Mobility/Transfers: Bed Mobility  Bed Mobility: Yes  Bed Mobility 1  Bed Mobility 1: Supine to sitting  Level of Assistance 1: Minimum assistance, Minimal verbal cues  Bed Mobility Comments 1: increased time and use of bedrail  Bed Mobility 2  Bed Mobility  2: Sitting to supine  Level of Assistance 2: Moderate assistance  Bed Mobility Comments 2: assist for LE's and to guide trunk down  Bed Mobility 3  Bed Mobility 3: Scooting  Level of Assistance 3: Close supervision  Bed Mobility Comments 3: Pt was able to scoot up towards the HOB    Transfers  Transfer: Yes  Transfer 1  Transfer From 1: Sit to  Transfer to 1: Stand  Technique 1: Sit to stand  Transfer Device 1: Walker  Transfer Level of Assistance 1: Minimum assistance, Minimal verbal cues  Trials/Comments 1: verbal cues for hand placement. pt did x5 sit to stands in a row    Functional Mobility:  Functional Mobility  Functional Mobility Performed: Yes  Functional Mobility 1  Surface 1: Level tile  Device 1: Rolling walker  Assistance 1: Moderate assistance, Moderate verbal cues  Comments 1: Pt took two steps alongside the bed with the FWW at mod A and then took a rest break. Pt then took 3-4 more steps alongside the bed in the other direction with the FWW at mod A. Pt required cues for sequencing steps and walker  Sitting Balance:  Static Sitting Balance  Static Sitting-Balance Support: Feet supported, Bilateral upper extremity supported  Static Sitting-Level of Assistance: Close supervision  Dynamic Sitting Balance  Dynamic Sitting-Balance Support: Feet supported, No upper extremity supported  Dynamic Sitting-Balance: Forward lean  Dynamic Sitting-Comments: close  supervision  Standing Balance:  Static Standing Balance  Static Standing-Balance Support: Bilateral upper extremity supported  Static Standing-Level of Assistance: Moderate assistance, Minimum assistance  Static Standing-Comment/Number of Minutes: Pt initially needed mod A for standing balance d/t L lateral lean but she progressed to min A once she got her balance  Dynamic Standing Balance  Dynamic Standing-Balance Support: Bilateral upper extremity supported  Dynamic Standing-Comments: Mod A with FWW     Outcome Measures:  [facility] Daily Activity  Putting on and taking off regular lower body clothing: A lot  Bathing (including washing, rinsing, drying): A lot  Putting on and taking off regular upper body clothing: A little  Toileting, which includes using toilet, bedpan or urinal: A lot  Taking care of personal grooming such as brushing teeth: A little  Eating Meals: None  Daily Activity - Total Score: 16      Education Documentation  Body Mechanics, taught by Em Calabrese OT at 4/9/2024 11:38 AM.  Learner: Patient  Readiness: Acceptance  Method: Explanation  Response: Verbalizes Understanding, Demonstrated Understanding  Comment: Educated on skin integrity with walking boot and taking it off in bed. Educated on body mechanics with transfers and sequencing with mobility    Precautions, taught by Em Calabrese OT at 4/9/2024 11:38 AM.  Learner: Patient  Readiness: Acceptance  Method: Explanation  Response: Verbalizes Understanding, Demonstrated Understanding  Comment: Educated on skin integrity with walking boot and taking it off in bed. Educated on body mechanics with transfers and sequencing with mobility    ADL Training, taught by Em Calabrese OT at 4/9/2024 11:38 AM.  Learner: Patient  Readiness: Acceptance  Method: Explanation  Response: Verbalizes Understanding, Demonstrated Understanding  Comment: Educated on skin integrity with walking boot and taking it off in bed. Educated on body  mechanics with transfers and sequencing with mobility    Education Comments  No comments found.      Goals:  Encounter Problems       Encounter Problems (Active)       OT Goals       Pt will tolerate 10min stand during functional task completion with no more than 1 rest break in order to increase endurance for functional task completion.  (Progressing)       Start:  04/06/24    Expected End:  04/20/24            Pt will increase endurance to tolerate 15min of OOB activity with no more than 1 rest break in order to increase ability to engage in ADL completion.  (Progressing)       Start:  04/06/24    Expected End:  04/20/24            Pt will complete ilaf-lw-amol transfers using LRD in preparation for ADLs with supervision  (Progressing)       Start:  04/06/24    Expected End:  04/20/24            Pt will demo LE ADL completion with modified independence, using AE if needed.  (Progressing)       Start:  04/06/24    Expected End:  04/20/24            Pt will demo and/or verbalize 2-3 energy conservation techniques to incorporate into functional mobility or ADL to improve performance and increase independence.  (Progressing)       Start:  04/06/24    Expected End:  04/20/24

## 2024-04-09 NOTE — PROGRESS NOTES
Subjective    Magi Boyd is a 79 y.o. female presenting for infected pressure ulcer. Pt doing well, no pain, difficult to ambulate d/t lightheadedness. Low PO intake, oliguric.    Objective    Physical Exam  Constitutional:       General: She is not in acute distress.     Appearance: Normal appearance. She is obese. She is not ill-appearing.   HENT:      Head: Normocephalic and atraumatic.      Nose: Nose normal.      Mouth/Throat:      Mouth: Mucous membranes are moist.      Pharynx: Oropharynx is clear.   Cardiovascular:      Rate and Rhythm: Normal rate and regular rhythm.      Heart sounds: Normal heart sounds. No murmur heard.     No friction rub. No gallop.   Pulmonary:      Effort: Pulmonary effort is normal. No respiratory distress.      Breath sounds: Normal breath sounds. No wheezing, rhonchi or rales.   Abdominal:      General: Abdomen is flat. Bowel sounds are normal. There is no distension.      Palpations: Abdomen is soft.      Tenderness: There is abdominal tenderness. There is no guarding or rebound.   Musculoskeletal:      Right lower leg: Edema present.      Left lower leg: Edema present.      Comments: RLE in boot, dressed and wrapped.   Skin:     General: Skin is warm and dry.      Capillary Refill: Capillary refill takes 2 to 3 seconds.   Neurological:      General: No focal deficit present.      Mental Status: She is alert and oriented to person, place, and time.     Temp:  [36.1 °C (97 °F)-36.7 °C (98 °F)] 36.7 °C (98 °F)  Heart Rate:  [61-74] 63  Resp:  [14-20] 16  BP: ()/(61-76) 112/61    Vitals:    04/03/24 1610   Weight: 104 kg (229 lb)     I/Os    Intake/Output Summary (Last 24 hours) at 4/9/2024 1238  Last data filed at 4/9/2024 0924  Gross per 24 hour   Intake 340 ml   Output 450 ml   Net -110 ml       Labs:   Results from last 72 hours   Lab Units 04/09/24  0718 04/08/24  0612 04/07/24  0632   SODIUM mmol/L 137 138 138   POTASSIUM mmol/L 3.6 3.8 3.4*   CHLORIDE mmol/L 102  102 101   CO2 mmol/L 27 29 28   BUN mg/dL 30* 27* 25*   CREATININE mg/dL 3.25* 2.80* 2.43*   GLUCOSE mg/dL 84 80 88   CALCIUM mg/dL 9.2 8.8 9.0   ANION GAP mmol/L 12 11 12   EGFR mL/min/1.73m*2 14* 17* 20*   PHOSPHORUS mg/dL 3.2  --   --         Results from last 72 hours   Lab Units 04/09/24  0718 04/08/24  0612 04/07/24  0632   WBC AUTO x10*3/uL 6.0 6.6 5.8   HEMOGLOBIN g/dL 10.1* 10.4* 11.1*   HEMATOCRIT % 32.6* 33.1* 34.4*   PLATELETS AUTO x10*3/uL 225 245 250        Lab Results   Component Value Date    CALCIUM 9.2 04/09/2024    PHOS 3.2 04/09/2024      Lab Results   Component Value Date    CRP 2.55 (H) 04/03/2024      Micro/ID:   Susceptibility data from last 90 days.  Collected Specimen Info Organism Amoxicillin/Clavulanate Ampicillin Ampicillin/Sulbactam Cefazolin Cefepime Ciprofloxacin Gentamicin Nitrofurantoin Piperacillin/Tazobactam Trimethoprim/Sulfamethoxazole   04/06/24 Tissue/Biopsy from Wound/Tissue Mixed Skin Microorganisms              03/14/24 Urine from Clean Catch/Voided Enterobacter cloacae complex R R R R S S S S S R       Lab Results   Component Value Date    URINECULTURE >100,000 Enterobacter cloacae complex (A) 03/14/2024     Images:  Vascular US lower extremity venous duplex bilateral            [place] [id number]   Tel [phone] and Fax [phone]       Vascular Lab Report  VASC US LOWER EXTREMITY VENOUS DUPLEX BILATERAL       Patient Name:      CHANDNI Khalil Physician: [id number] Dayday De La Rosa DO  Study Date:        4/4/2024            Ordering           [id number] BERNIE MCDANIEL                                         Physician:         AMANDA  MRN/PID:           [id number]            Technologist:      Laila Aleman RVT  Accession#:        [id number]        Technologist 2:  Date of Birth/Age: 1945 / 79      Encounter#:        [id number]                     years  Gender:            F  Admission Status:  Inpatient           Location            [facility]                                         Performed:       Diagnosis/ICD: Localized (leg) edema-R60.0  CPT Codes:     54476 Peripheral venous duplex scan for DVT complete       CONCLUSIONS:  Right Lower Venous: No evidence of acute deep vein thrombus visualized in the right lower extremity. Cannot rule out thrombus in non-visualized Peroneal vein due to edema. Right posterior tibial veins were visualized in segments due to edema; cannot rule out DVT in non-visualized segments.  Left Lower Venous: No evidence of acute deep vein thrombus visualized in the left lower extremity. Cannot rule out thrombus in non-visualized peroneal vein. Left posterior tibial veins were visualized in segments due to edema; cannot rule out DVT in non-visualized segments.     Imaging & Doppler Findings:     Right                 Compressible Thrombus        Flow  Distal External Iliac     Yes        None   Spontaneous/Phasic  CFV                       Yes        None   Spontaneous/Phasic  PFV                       Yes        None  FV Proximal               Yes        None   Spontaneous/Phasic  FV Mid                    Yes        None  FV Distal                 Yes        None  Popliteal                 Yes        None   Spontaneous/Phasic       Left                  Compress Thrombus        Flow  Distal External Iliac   Yes      None   Spontaneous/Phasic  CFV                     Yes      None   Spontaneous/Phasic  PFV                     Yes      None  FV Proximal             Yes      None   Spontaneous/Phasic  FV Mid                  Yes      None  FV Distal               Yes      None  Popliteal               Yes      None   Spontaneous/Phasic  PTV                     Yes      None       [id number] Dayday De La Rosa DO  Electronically signed by [id number] Dayday De La Rosa DO on 4/4/2024 at 4:20:01 PM       ** Final **  MR foot right wo IV contrast, MR ankle right wo IV contrast  Narrative: Interpreted By:  Imelda David,    STUDY:  MRI of the right lower extremity without IV contrast;      INDICATION:  Signs/Symptoms:concern for OM of R foot; Signs/Symptoms:r/out  osteomyelitis      COMPARISON:  Right foot radiographs dated 04/03/2024      ACCESSION NUMBER(S):  [id number]; [id number]      ORDERING CLINICIAN:  HAKAN BURNETTE      TECHNIQUE:  Multiplanar multisequence MRI of the  right lower extremity was  performed  without IV contrast.      FINDINGS:  Soft tissues: There is a plantar soft tissue ulceration at the level  of heel measuring a proximally 3 x 2 cm in AP and transverse  dimensions respectively. There is diffuse subcutaneous soft tissue  edema throughout the plantar soft tissues of the foot extending  distally to the level of forefoot. This is suggestive of cellulitis.  There is also diffuse circumferential subcutaneous soft tissue edema  extending throughout the included portion of distal leg above the  level of ankle joint and extends distally along the dorsal lateral  aspect of the foot. This is a nonspecific finding and could represent  cellulitis versus venous stasis versus lymphedema. Evaluation for  abscess is limited due to noncontrast technique. Within this  limitation, there is no confluence fluid collection subjacent to the  site of ulceration to definitively suggest an abscess. There is  effacement of normal fat signal in the subcutaneous soft tissues at  the level of ulceration extending deep to the level of the plantar  fascia/posterior calcaneal tuberosity.      There is small volume fluid within the peroneal compartment tendon  sheath suggestive of mild tenosynovitis. The evaluation of tendon  morphology is limited due to mild motion artifacts on axial  sequences. The flexor and extensor compartment tendons appear grossly  unremarkable. There is mild patchy T2 hyperintense signal in the  plantar foot musculature with mild fatty atrophy on T1 weighted  sequences. This is most likely  favored to represent diabetic ischemic  myopathy. No evidence of intramuscular fluid collections within  limits of noncontrast technique. There is small plantar calcaneal  enthesophyte with mild thickening of the central band of plantar  fascia suggestive of chronic plantar fasciitis. No abnormal signal is  noted in the plantar fascia within limits of evaluation.      Bones:  There is effacement of normal fat signal in the plantar soft  tissues at the level of posterior calcaneal tuberosity adjacent to  the site of ulceration (as seen on sagittal image 13/32). There is  very faint bone marrow edema along the plantar cortex of posterior  calcaneal tuberosity subjacent to the level of ulceration. However no  evidence of confluence loss of normal fat signal to suggest  osteomyelitis. There are mild degenerative changes at the midfoot  articulations, more pronounced at 1st through 5th tarsometatarsal  joints with mild subchondral cystic changes. Tibiotalar joint  articulation is maintained and demonstrates mild-to-moderate  degenerative changes with trace tibiotalar joint effusion. Otherwise  rest of the osseous structures appear grossly unremarkable.      Impression: 1. Plantar heel soft tissue ulceration with subjacent cellulitis and  mild reactive osteitis along the plantar cortex of posterior  calcaneal tuberosity as described above. No definite MRI evidence of  osteomyelitis at this point in time. No evidence of abscess within  limits of noncontrast technique.  2. Mild tenosynovitis of the peroneal compartment tendons in the  retro malleolar groove. This is most likely favored to be reactive,  however infectious tenosynovitis can not be completely excluded.  3. Extensive circumferential subcutaneous soft tissue edema in the  visualized distal leg extending along the dorsal lateral aspect of  the foot is nonspecific finding. This could be related to cellulitis  versus venous stasis versus lymphedema. Recommend  clinical  correlation.  4. Signal abnormality in the plantar foot musculature is most likely  favored to be related to chronic diabetic ischemic myopathy.  Infectious myositis can also be considered in the differential.      MACRO:  None      Signed by: Imelda David 4/4/2024 1:12 PM  Dictation workstation:   [id number]       Meds:  Scheduled medications  atenolol, 100 mg, oral, Daily  atorvastatin, 20 mg, oral, Daily  ceFAZolin, 1 g, intravenous, q12h  collagenase, , Topical, Daily  famotidine, 10 mg, oral, Every other day  febuxostat, 40 mg, oral, Daily  ferrous gluconate, 324 mg, oral, BID  fluticasone furoate-vilanteroL, 1 puff, inhalation, Daily  [Held by provider] furosemide, 40 mg, oral, Daily  heparin (porcine), 7,500 Units, subcutaneous, q8h IMANI  lactated Ringer's, 500 mL, intravenous, Once  levothyroxine, 75 mcg, oral, Daily  oxybutynin, 5 mg, oral, Nightly      Continuous medications     PRN medications  PRN medications: acetaminophen, alum-mag hydroxide-simeth, ondansetron, polyethylene glycol, traMADol     Assessment    Magi Boyd is a 79 y.o. female presenting for malodorous drainage from foot ulcer, treated for cellulitis and pressure ulcer. Hx of gout, HTN, chronic venous insufficiency, CKD 4, hypothyroidism, overactive bladder, and asthma. Found to have varicella infection on admission. Developed JAKI and oliguria.    Acute medical issues  # R heel pressure ulcer stage II vs stage III s/p debridement on 4/6  # R heel cellulitis  # peripheral edema improving  - Cefazolin IV, cephalexin for 4 days on discharge  - Weight bearing on R foot with fracture boot per podiatry  - Wound care per podiatry  - Pending SNF    # Azotemia  # Oliguria  :: CKD4 follows with Dr Narayan   :: Low PO intake vs overdiuresis vs obstructive  - Bolus 500cc LR  - Urine lytes, urine albumin, osm, serum osm  - Hold PO furosemide 40mg  - Dr Narayan consulted  - Consider renal ultrasound    Resolved medical issues  #  VZV infection  - Present in single dermatome  - Completed acyclovir treatment    Chronic medical issues  # Gout  - febuxostat 40mg    # Fe defficiency  - Ferrous gluconate 324mg    # Asthma  - Continue home inhalers (breo)    # HTN  - Atenolol 100mg    # HLD  - Atorvastatin 20mg    # Overactive bladder  - Oxybutinin 5mg    # Hypothyroidism  - Levothyroxine 75ug    F PO  E Replete as needed  N Regular diet  G Famotidine  DVT: Sub Q heparin  Abx: Cefazolin  Drips: None  Code status: Full code  NoK:  Primary Emergency Contact: Ventura Boyd    Dispo: 79 F presenting with pressure ulcer and cellulitis s/p debridement 4/6. DC to SNF pending precert.    Christiano Roman MD

## 2024-04-09 NOTE — NURSING NOTE
1300- Assumed care of pt. Pt off floor for testing.   1500- Pt wound care completed to right heel. Pt  watched dressing change so he was aware how to complete it if needed after discharge. Pt tolerated well and thanked this nurse.

## 2024-04-09 NOTE — PROGRESS NOTES
Magi Boyd is a 79 y.o. female on day 6 of admission presenting with Failure of outpatient treatment.    Subjective   Interval History: no fever, no new complaints        Review of Systems    Objective   Range of Vitals (last 24 hours)  Heart Rate:  [61-74]   Temp:  [36.1 °C (97 °F)-36.7 °C (98 °F)]   Resp:  [14-20]   BP: ()/(61-76)   SpO2:  [92 %-96 %]   Daily Weight  04/03/24 : 104 kg (229 lb)    Body mass index is 40.57 kg/m².    Physical Exam  Constitutional:       Appearance: Normal appearance.   HENT:      Head: Normocephalic and atraumatic.      Mouth/Throat:      Mouth: Mucous membranes are moist.      Pharynx: Oropharynx is clear.   Eyes:      Pupils: Pupils are equal, round, and reactive to light.   Cardiovascular:      Rate and Rhythm: Normal rate and regular rhythm.      Heart sounds: Normal heart sounds.   Pulmonary:      Effort: Pulmonary effort is normal.      Breath sounds: Normal breath sounds.   Abdominal:      General: Abdomen is flat. Bowel sounds are normal.      Palpations: Abdomen is soft.   Musculoskeletal:      Cervical back: Normal range of motion.      Comments: Stasis  Rt heel wound   Neurological:      Mental Status: She is alert.         Antibiotics  cefepime (Maxipime) IV 2 g  vancomycin (Vancocin) 2000 mg/500 ml in D5W 500 mL IV piggyback 2,000 mg  metroNIDAZOLE (Flagyl) 500 mg in NaCl (iso-os) 100 mL  heparin (porcine) injection 7,500 Units  polyethylene glycol (Glycolax, Miralax) packet 17 g  atenolol (Tenormin) tablet 100 mg  atorvastatin (Lipitor) tablet 20 mg  fluticasone furoate-vilanteroL (Breo Ellipta) 100-25 mcg/dose inhaler 1 puff  famotidine-Ca carb-mag hydrox (Pepcid Complete) -165 mg chewable tablet 1 tablet  febuxostat (Uloric) tablet 40 mg  ferrous gluconate (Fergon) 324 (38 Fe) mg tablet 324 mg  furosemide (Lasix) tablet 40 mg  levothyroxine (Synthroid, Levoxyl) tablet 75 mcg  oxybutynin (Ditropan) tablet 5 mg  solifenacin (VESIcare) tablet 5  mg  traMADol (Ultram) tablet  famotidine (Pepcid) tablet 10 mg  alum-mag hydroxide-simeth (Mylanta) 200-200-20 mg/5 mL oral suspension 30 mL  cefepime (Maxipime) 1 g in dextrose 5% 50 mL IV  vancomycin (Vancocin) pharmacy to dose - pharmacy monitoring  acyclovir (Zovirax) capsule 400 mg  traMADol (Ultram) tablet 50 mg  ceFAZolin in dextrose (iso-os) (Ancef) IVPB 1 g      Relevant Results  Labs  Results from last 72 hours   Lab Units 04/09/24 0718 04/08/24 0612 04/07/24  0632   WBC AUTO x10*3/uL 6.0 6.6 5.8   HEMOGLOBIN g/dL 10.1* 10.4* 11.1*   HEMATOCRIT % 32.6* 33.1* 34.4*   PLATELETS AUTO x10*3/uL 225 245 250       Results from last 72 hours   Lab Units 04/09/24 0718 04/08/24 0612 04/07/24  0632   SODIUM mmol/L 137 138 138   POTASSIUM mmol/L 3.6 3.8 3.4*   CHLORIDE mmol/L 102 102 101   CO2 mmol/L 27 29 28   BUN mg/dL 30* 27* 25*   CREATININE mg/dL 3.25* 2.80* 2.43*   GLUCOSE mg/dL 84 80 88   CALCIUM mg/dL 9.2 8.8 9.0   ANION GAP mmol/L 12 11 12   EGFR mL/min/1.73m*2 14* 17* 20*   PHOSPHORUS mg/dL 3.2  --   --        Results from last 72 hours   Lab Units 04/09/24 0718 04/08/24 0612 04/07/24  0632   ALK PHOS U/L  --  53 55   BILIRUBIN TOTAL mg/dL  --  0.3 0.3   PROTEIN TOTAL g/dL  --  5.5* 6.1*   ALT U/L  --  5* 9   AST U/L  --  31 34   ALBUMIN g/dL 2.6* 2.6* 2.8*       Estimated Creatinine Clearance: 16.7 mL/min (A) (by C-G formula based on SCr of 3.25 mg/dL (H)).  C-Reactive Protein   Date Value Ref Range Status   04/03/2024 2.55 (H) <1.00 mg/dL Final     Microbiology  Reviewed  Imaging  Reviewed        Assessment/Plan   Right heel none healing wound / possible infection, the MRI was reviewed, no bony erosions sp bedside debridement, the culture with rare mixed skin monisha  Healing Lt thoracic zoster     Recommendations :  Continue Cefazolin, plan on a short course of oral Keflex x 5 days with discharge  Discussed with the wound care team     I spent minutes in the professional and overall care of this  patient.      Christopher Sharp MD

## 2024-04-10 ENCOUNTER — APPOINTMENT (OUTPATIENT)
Dept: CARDIOLOGY | Facility: HOSPITAL | Age: 79
DRG: 571 | End: 2024-04-10
Payer: MEDICARE

## 2024-04-10 LAB
ALBUMIN SERPL BCP-MCNC: 2.7 G/DL (ref 3.4–5)
ANION GAP SERPL CALC-SCNC: 10 MMOL/L (ref 10–20)
BNP SERPL-MCNC: 73 PG/ML (ref 0–99)
BUN SERPL-MCNC: 31 MG/DL (ref 6–23)
CALCIUM SERPL-MCNC: 9.3 MG/DL (ref 8.6–10.3)
CHLORIDE SERPL-SCNC: 101 MMOL/L (ref 98–107)
CO2 SERPL-SCNC: 28 MMOL/L (ref 21–32)
CREAT SERPL-MCNC: 3.48 MG/DL (ref 0.5–1.05)
CREAT UR-MCNC: 187.9 MG/DL (ref 20–320)
EGFRCR SERPLBLD CKD-EPI 2021: 13 ML/MIN/1.73M*2
ERYTHROCYTE [DISTWIDTH] IN BLOOD BY AUTOMATED COUNT: 13.2 % (ref 11.5–14.5)
GLUCOSE SERPL-MCNC: 81 MG/DL (ref 74–99)
HCT VFR BLD AUTO: 31.5 % (ref 36–46)
HGB BLD-MCNC: 10 G/DL (ref 12–16)
MCH RBC QN AUTO: 30.3 PG (ref 26–34)
MCHC RBC AUTO-ENTMCNC: 31.7 G/DL (ref 32–36)
MCV RBC AUTO: 96 FL (ref 80–100)
MICROALBUMIN UR-MCNC: 52.3 MG/L
MICROALBUMIN/CREAT UR: 27.8 UG/MG CREAT
NRBC BLD-RTO: 0 /100 WBCS (ref 0–0)
OSMOLALITY UR: 438 MOSM/KG (ref 200–1200)
PHOSPHATE SERPL-MCNC: 3 MG/DL (ref 2.5–4.9)
PLATELET # BLD AUTO: 239 X10*3/UL (ref 150–450)
POTASSIUM SERPL-SCNC: 3.8 MMOL/L (ref 3.5–5.3)
RBC # BLD AUTO: 3.3 X10*6/UL (ref 4–5.2)
SODIUM SERPL-SCNC: 135 MMOL/L (ref 136–145)
WBC # BLD AUTO: 6.7 X10*3/UL (ref 4.4–11.3)

## 2024-04-10 PROCEDURE — 97530 THERAPEUTIC ACTIVITIES: CPT | Mod: GP,CQ

## 2024-04-10 PROCEDURE — 99232 SBSQ HOSP IP/OBS MODERATE 35: CPT | Performed by: STUDENT IN AN ORGANIZED HEALTH CARE EDUCATION/TRAINING PROGRAM

## 2024-04-10 PROCEDURE — 93005 ELECTROCARDIOGRAM TRACING: CPT

## 2024-04-10 PROCEDURE — 2500000004 HC RX 250 GENERAL PHARMACY W/ HCPCS (ALT 636 FOR OP/ED)

## 2024-04-10 PROCEDURE — 85027 COMPLETE CBC AUTOMATED: CPT

## 2024-04-10 PROCEDURE — 83880 ASSAY OF NATRIURETIC PEPTIDE: CPT

## 2024-04-10 PROCEDURE — 1100000001 HC PRIVATE ROOM DAILY

## 2024-04-10 PROCEDURE — 97116 GAIT TRAINING THERAPY: CPT | Mod: GP,CQ

## 2024-04-10 PROCEDURE — 2500000004 HC RX 250 GENERAL PHARMACY W/ HCPCS (ALT 636 FOR OP/ED): Mod: JZ

## 2024-04-10 PROCEDURE — 36415 COLL VENOUS BLD VENIPUNCTURE: CPT

## 2024-04-10 PROCEDURE — 2500000001 HC RX 250 WO HCPCS SELF ADMINISTERED DRUGS (ALT 637 FOR MEDICARE OP)

## 2024-04-10 PROCEDURE — 80069 RENAL FUNCTION PANEL: CPT

## 2024-04-10 RX ADMIN — COLLAGENASE SANTYL 1 APPLICATION: 250 OINTMENT TOPICAL at 11:35

## 2024-04-10 RX ADMIN — FERROUS GLUCONATE 324 MG: 324 TABLET ORAL at 09:00

## 2024-04-10 RX ADMIN — ATORVASTATIN CALCIUM 20 MG: 20 TABLET, FILM COATED ORAL at 21:06

## 2024-04-10 RX ADMIN — FLUTICASONE FUROATE AND VILANTEROL TRIFENATATE 1 PUFF: 100; 25 POWDER RESPIRATORY (INHALATION) at 08:48

## 2024-04-10 RX ADMIN — TRAMADOL HYDROCHLORIDE 50 MG: 50 TABLET, COATED ORAL at 12:50

## 2024-04-10 RX ADMIN — CEFAZOLIN SODIUM 1 G: 1 INJECTION, SOLUTION INTRAVENOUS at 08:48

## 2024-04-10 RX ADMIN — FAMOTIDINE 10 MG: 20 TABLET ORAL at 08:49

## 2024-04-10 RX ADMIN — FERROUS GLUCONATE 324 MG: 324 TABLET ORAL at 21:07

## 2024-04-10 RX ADMIN — FEBUXOSTAT 40 MG: 40 TABLET ORAL at 08:48

## 2024-04-10 RX ADMIN — HEPARIN SODIUM 7500 UNITS: 5000 INJECTION INTRAVENOUS; SUBCUTANEOUS at 06:16

## 2024-04-10 RX ADMIN — HEPARIN SODIUM 7500 UNITS: 5000 INJECTION INTRAVENOUS; SUBCUTANEOUS at 13:33

## 2024-04-10 RX ADMIN — HEPARIN SODIUM 7500 UNITS: 5000 INJECTION INTRAVENOUS; SUBCUTANEOUS at 21:06

## 2024-04-10 RX ADMIN — LEVOTHYROXINE SODIUM 75 MCG: 75 TABLET ORAL at 06:16

## 2024-04-10 ASSESSMENT — COGNITIVE AND FUNCTIONAL STATUS - GENERAL
STANDING UP FROM CHAIR USING ARMS: A LOT
CLIMB 3 TO 5 STEPS WITH RAILING: A LOT
DRESSING REGULAR LOWER BODY CLOTHING: A LOT
TURNING FROM BACK TO SIDE WHILE IN FLAT BAD: A LITTLE
MOVING FROM LYING ON BACK TO SITTING ON SIDE OF FLAT BED WITH BEDRAILS: A LOT
DRESSING REGULAR UPPER BODY CLOTHING: A LITTLE
MOVING FROM LYING ON BACK TO SITTING ON SIDE OF FLAT BED WITH BEDRAILS: A LOT
CLIMB 3 TO 5 STEPS WITH RAILING: A LOT
MOVING TO AND FROM BED TO CHAIR: A LOT
STANDING UP FROM CHAIR USING ARMS: A LOT
HELP NEEDED FOR BATHING: A LOT
MOBILITY SCORE: 13
MOVING TO AND FROM BED TO CHAIR: A LOT
DAILY ACTIVITIY SCORE: 16
MOBILITY SCORE: 13
TURNING FROM BACK TO SIDE WHILE IN FLAT BAD: A LITTLE
TOILETING: A LOT
PERSONAL GROOMING: A LITTLE
WALKING IN HOSPITAL ROOM: A LOT
WALKING IN HOSPITAL ROOM: A LOT

## 2024-04-10 ASSESSMENT — PAIN SCALES - GENERAL: PAINLEVEL_OUTOF10: 0 - NO PAIN

## 2024-04-10 ASSESSMENT — PAIN - FUNCTIONAL ASSESSMENT: PAIN_FUNCTIONAL_ASSESSMENT: 0-10

## 2024-04-10 NOTE — PROGRESS NOTES
Subjective    Magi Boyd is a 79 y.o. female presenting for infected pressure ulcer. Pt doing well, no pain, difficult to ambulate d/t lightheadedness. Working on PO intake, still has low urine output.    Objective    Physical Exam  Constitutional:       General: She is not in acute distress.     Appearance: Normal appearance. She is obese. She is not ill-appearing.   HENT:      Head: Normocephalic and atraumatic.      Nose: Nose normal.      Mouth/Throat:      Mouth: Mucous membranes are moist.      Pharynx: Oropharynx is clear.   Cardiovascular:      Rate and Rhythm: Normal rate and regular rhythm.      Heart sounds: Normal heart sounds. No murmur heard.     No friction rub. No gallop.   Pulmonary:      Effort: Pulmonary effort is normal. No respiratory distress.      Breath sounds: Normal breath sounds. No wheezing, rhonchi or rales.   Abdominal:      General: Abdomen is flat. Bowel sounds are normal. There is no distension.      Palpations: Abdomen is soft.      Tenderness: There is no abdominal tenderness. There is no guarding or rebound.   Musculoskeletal:      Right lower leg: Edema present.      Left lower leg: Edema present.      Comments: RLE in boot, dressed and wrapped.   Skin:     General: Skin is warm and dry.      Capillary Refill: Capillary refill takes less than 2 seconds.   Neurological:      General: No focal deficit present.      Mental Status: She is alert and oriented to person, place, and time.     Temp:  [36.3 °C (97.3 °F)-36.5 °C (97.7 °F)] 36.5 °C (97.7 °F)  Heart Rate:  [61-65] 61  Resp:  [14-22] 14  BP: (114-139)/(66-74) 114/66    Vitals:    04/03/24 1610   Weight: 104 kg (229 lb)     I/Os    Intake/Output Summary (Last 24 hours) at 4/10/2024 1303  Last data filed at 4/10/2024 0804  Gross per 24 hour   Intake 1050 ml   Output 350 ml   Net 700 ml     Labs:   Results from last 72 hours   Lab Units 04/10/24  0650 04/09/24  0718 04/08/24  0612   SODIUM mmol/L 135* 137 138   POTASSIUM  mmol/L 3.8 3.6 3.8   CHLORIDE mmol/L 101 102 102   CO2 mmol/L 28 27 29   BUN mg/dL 31* 30* 27*   CREATININE mg/dL 3.48* 3.25* 2.80*   GLUCOSE mg/dL 81 84 80   CALCIUM mg/dL 9.3 9.2 8.8   ANION GAP mmol/L 10 12 11   EGFR mL/min/1.73m*2 13* 14* 17*   PHOSPHORUS mg/dL 3.0 3.2  --       Results from last 72 hours   Lab Units 04/10/24  0650 04/09/24  0718 04/08/24  0612   WBC AUTO x10*3/uL 6.7 6.0 6.6   HEMOGLOBIN g/dL 10.0* 10.1* 10.4*   HEMATOCRIT % 31.5* 32.6* 33.1*   PLATELETS AUTO x10*3/uL 239 225 245      Lab Results   Component Value Date    CALCIUM 9.3 04/10/2024    PHOS 3.0 04/10/2024      Lab Results   Component Value Date    CRP 2.55 (H) 04/03/2024      Micro/ID:   Susceptibility data from last 90 days.  Collected Specimen Info Organism Amoxicillin/Clavulanate Ampicillin Ampicillin/Sulbactam Cefazolin Cefepime Ciprofloxacin Gentamicin Nitrofurantoin Piperacillin/Tazobactam Trimethoprim/Sulfamethoxazole   04/06/24 Tissue/Biopsy from Wound/Tissue Mixed Skin Microorganisms              03/14/24 Urine from Clean Catch/Voided Enterobacter cloacae complex R R R R S S S S S R     Lab Results   Component Value Date    URINECULTURE >100,000 Enterobacter cloacae complex (A) 03/14/2024     Images:  US renal complete  Narrative: Interpreted By:  An De Leon,   STUDY:  US RENAL COMPLETE; 4/9/2024 5:12 pm      INDICATION:  Signs/Symptoms:worsening renal function, oliguria, rule out  obstruction.      COMPARISON:  08/23/2021      ACCESSION NUMBER(S):  [id number]      ORDERING CLINICIAN:  JANELL JOLLEY      TECHNIQUE:  Grayscale and color Doppler imaging of the kidneys.      FINDINGS:  The right kidney measures 9.7cm  The left kidney measures 10.0cm  There is no shadowing calculus, hydronephrosis, or solid mass  identified. Mild renal cortical thinning bilaterally. Cortical  echogenicity is normal. Urinary bladder is decompressed. Bilateral  ureteral jets are noted      Impression: 1. Mild bilateral renal cortical thinning.  No hydronephrosis      .      MACRO:  None.      Signed by: An De Leon 4/9/2024 7:59 PM  Dictation workstation:   [id number]  Transthoracic Echo (TTE) Complete     [facility], [address] [place] [address]                Tel [phone] and Fax [phone]    TRANSTHORACIC ECHOCARDIOGRAM REPORT       Patient Name:      CHANDNI RODRIGUEZ   Reading Physician:    [id number] Crawford MD  Study Date:        4/9/2024             Ordering Provider:    [id number] TARIQ HENDERSON  MRN/PID:           [id number]             Fellow:  Accession#:        [id number]         Nurse:  Date of Birth/Age: 1945 / 79 years Sonographer:          Melinda Patrick RDCS  Gender:            F                    Additional Staff:  Height:            160.02 cm            Admit Date:           4/3/2024  Weight:            103.87 kg            Admission Status:     Inpatient -                                                                Routine  BSA / BMI:         2.05 m2 / 40.57      Encounter#:           [id number]                     kg/m2                                          Department Location:  [facility] Non                                                                Invasive  Blood Pressure: 110 /68 mmHg    Study Type:    TRANSTHORACIC ECHO (TTE) COMPLETE  Diagnosis/ICD: Localized edema-R60.0  Indication:    Localized edema  CPT Code:      Echo Complete w Full Doppler-17417    Patient History:  Pertinent History: HTN and CKD stage IV, Gout, GERD, rt heel wound.    Study Detail: The following Echo studies were performed: 2D, M-Mode, Doppler and                color flow. Technically challenging study due to patient lying in                supine position. Unable to obtain suprasternal notch view. The                 patient was awake.       PHYSICIAN INTERPRETATION:  Left Ventricle: The left ventricular systolic function is low normal, with an estimated ejection fraction of 50-55%. The left ventricular cavity size is upper limits of normal. Spectral Doppler shows an impaired relaxation pattern of left ventricular diastolic filling.  Left Atrium: The left atrium is normal in size.  Right Ventricle: The right ventricle is mildly enlarged. There is normal right ventricular global systolic function.  Right Atrium: The right atrium is normal in size.  Aortic Valve: The aortic valve was not well visualized. There is no evidence of aortic valve stenosis.  There is no evidence of aortic valve regurgitation. The peak instantaneous gradient of the aortic valve is 6.5 mmHg. The mean gradient of the aortic valve is 3.8 mmHg.  Mitral Valve: The mitral valve is normal in structure. There is trace mitral valve regurgitation.  Tricuspid Valve: The tricuspid valve is structurally normal. There is trace tricuspid regurgitation. The Doppler estimated RVSP is within normal limits at 26.0 mmHg.  Pulmonic Valve: The pulmonic valve is not well visualized. The pulmonic valve regurgitation was not well visualized.  Pericardium: There is no pericardial effusion noted.  Aorta: The aortic root was not well visualized.  Systemic Veins: The inferior vena cava appears to be of normal size. There is IVC inspiratory collapse greater than 50%.       CONCLUSIONS:   1. Left ventricular systolic function is low normal with a 50-55% estimated ejection fraction.   2. Poorly visualized anatomical structures due to suboptimal image quality.   3. Spectral Doppler shows an impaired relaxation pattern of left ventricular diastolic filling.   4. RVSP within normal limits.   5. Aortic valve stenosis is not present.    QUANTITATIVE DATA SUMMARY:  2D MEASUREMENTS:                            Normal Ranges:  IVSd:          1.12 cm    (0.6-1.1cm)  LVPWd:         1.29 cm     (0.6-1.1cm)  LVIDd:         5.25 cm    (3.9-5.9cm)  LVIDs:         3.24 cm  LV Mass Index: 124.4 g/m2  LV % FS        38.3 %    LA VOLUME:                                Normal Ranges:  LA Vol A4C:        28.7 ml    (22+/-6mL/m2)  LA Vol A2C:        40.5 ml  LA Vol BP:         36.9 ml  LA Vol Index A4C:  14.0 ml/m2  LA Vol Index A2C:  19.8 ml/m2  LA Vol Index BP:   18.0 ml/m2  LA Area A4C:       13.5 cm2  LA Area A2C:       14.8 cm2  LA Major Axis A4C: 5.4 cm  LA Major Axis A2C: 4.6 cm  LA Volume Index:   18.1 ml/m2  LA Vol A4C:        25.5 ml  LA Vol A2C:        40.9 ml    RA VOLUME BY A/L METHOD:                        Normal Ranges:  RA Area A4C: 18.3 cm2    LV SYSTOLIC FUNCTION BY 2D PLANIMETRY (MOD):                      Normal Ranges:  EF-A4C View: 50.1 % (>=55%)  EF-A2C View: 64.0 %  EF-Biplane:  52.0 %    LV DIASTOLIC FUNCTION:                            Normal Ranges:  MV Peak E:    0.57 m/s    (0.7-1.2 m/s)  MV Peak A:    0.65 m/s    (0.42-0.7 m/s)  E/A Ratio:    0.88        (1.0-2.2)  MV e'         0.08 m/s    (>8.0)  MV lateral e' 0.14 m/s  MV medial e'  0.08 m/s  MV A Dur:     162.70 msec  E/e' Ratio:   7.13        (<8.0)    MITRAL VALVE:                  Normal Ranges:  MV DT: 164 msec (150-240msec)    AORTIC VALVE:                                    Normal Ranges:  AoV Vmax:                1.28 m/s (<=1.7m/s)  AoV Peak P.5 mmHg (<20mmHg)  AoV Mean PG:             3.8 mmHg (1.7-11.5mmHg)  LVOT Max Eben:            1.23 m/s (<=1.1m/s)  AoV VTI:                 24.86 cm (18-25cm)  LVOT VTI:                25.18 cm  LVOT Diameter:           1.90 cm  (1.8-2.4cm)  AoV Area, VTI:           2.88 cm2 (2.5-5.5cm2)  AoV Area,Vmax:           2.74 cm2 (2.5-4.5cm2)  AoV Dimensionless Index: 1.01       RIGHT VENTRICLE:  RV Basal 4.50 cm  RV Mid   3.10 cm  RV Major 7.1 cm  TAPSE:   24.0 mm  RV s'    0.12 m/s    TRICUSPID VALVE/RVSP:                              Normal Ranges:  Peak TR Velocity: 2.40  m/s  RV Syst Pressure: 26.0 mmHg (< 30mmHg)  IVC Diam:         1.80 cm    PULMONIC VALVE:                       Normal Ranges:  PV Max Eben: 1.0 m/s  (0.6-0.9m/s)  PV Max P.3 mmHg       [id number] Oneil Crawford MD  Electronically signed on 2024 at 3:13:38 PM       ** Final **     Meds:  Scheduled medications  atenolol, 100 mg, oral, Daily  atorvastatin, 20 mg, oral, Daily  collagenase, , Topical, Daily  famotidine, 10 mg, oral, Every other day  febuxostat, 40 mg, oral, Daily  ferrous gluconate, 324 mg, oral, BID  fluticasone furoate-vilanteroL, 1 puff, inhalation, Daily  [Held by provider] furosemide, 40 mg, oral, Daily  heparin (porcine), 7,500 Units, subcutaneous, q8h IMANI  levothyroxine, 75 mcg, oral, Daily  oxybutynin, 5 mg, oral, Nightly      PRN medications  PRN medications: acetaminophen, alum-mag hydroxide-simeth, ondansetron, polyethylene glycol, traMADol     Assessment    Magi Boyd is a 79 y.o. female presenting for malodorous drainage from foot ulcer, treated for cellulitis and pressure ulcer. Hx of gout, HTN, chronic venous insufficiency, CKD 4, hypothyroidism, overactive bladder, and asthma. Found to have varicella infection on admission. Developed JAKI and oliguria, unclear cause.    Acute medical issues  # R heel pressure ulcer stage II vs stage III s/p debridement on   # R heel cellulitis  # peripheral edema improving  - Discontinue antibiotics  - Weight bearing on R foot with fracture boot per podiatry  - Wound care per podiatry  - Pending SNF    # Azotemia  # Oliguria  :: CKD4 follows with Dr Narayan   :: Low PO intake vs overdiuresis vs obstructive  - Urine lytes, urine albumin, osm, serum osm WNL  - Hold PO furosemide 40mg  - Nephrology following  -- Etiology of azotemia unclear, recommended DC antibioitcs, ID agreed  - Renal US shows cortical thickening without hydronephrosis    Resolved medical issues  # VZV infection  - Present in single dermatome  - Completed acyclovir  treatment    Chronic medical issues  # Gout  - febuxostat 40mg    # Fe defficiency  - Ferrous gluconate 324mg    # Asthma  - Continue home inhalers (breo)    # HTN  - Atenolol 100mg    # HLD  - Atorvastatin 20mg    # Overactive bladder  - Oxybutinin 5mg    # Hypothyroidism  - Levothyroxine 75ug    F PO  E Replete as needed  N Regular diet  G Famotidine  DVT: Sub Q heparin  Abx: Cefazolin  Drips: None  Code status: Full code  NoK:  Primary Emergency Contact: Ventura Boyd    Dispo: 79 F presenting with pressure ulcer and cellulitis s/p debridement 4/6. DC to SNF pending precert.    Christiano Roman MD

## 2024-04-10 NOTE — PROGRESS NOTES
04/10/24 1015   Discharge Planning   Living Arrangements Spouse/significant other   Support Systems Spouse/significant other   Assistance Needed Patient is A&Ox3, needs assistance from spouse with ADL's, he reports she is unable to bear weight on R foot due to wound, he will stand pivot her to wheelchair, she has a walker as well in the home, split level home with a chair lift, does not drive   Type of Residence Private residence   Home or Post Acute Services Post acute facilities (Rehab/SNF/etc)   Type of Post Acute Facility Services Skilled nursing   Patient expects to be discharged to: [facility]- clinicals submitted by direct precert team to request SNF-requested precert be submitted by [facility]  4/10   Does the patient need discharge transport arranged? Yes   RoundTrip coordination needed? Yes   Has discharge transport been arranged? No

## 2024-04-10 NOTE — CONSULTS
JAKI -unclear etiology at this point as she has not responded to volume and holding of diuretics.  Blood pressure is low but she is not getting any medications that are exacerbating that presently.  Previous bladder scans have been negative  Hypertensive CKD  CKD 4  Cellulitis of pressure ulcer status postdebridement debridement on IV antibiotics      I am not sure why her creatinine continues to uptrend.  She has gotten over a liter of fluid over the past 48 hours and is off her Lasix with a higher blood pressure so I do not think she is dry at this point.  Only other significant risk factors the IV antibiotics    Suggest discussing with ID if we can change that.  Discussed with primary team.  I did an extensive review of her chart including labs dating back over 5 years and multiple progress notes during this hospital stay    Medical decision making moderate        Asked to see for JAKI.  Has significant CKD with a baseline serum creatinine around 2.5.  Creatinine is up to 3.5 here during his hospital stay.  She is here for cellulitis of a pressure ulcer that required debridement.  Currently on IV antibiotics.  Blood pressure has been low with a history of hypertension.  She was getting Lasix but that was held recently.  Also got over 1 L of IV fluids the past 48 hours.  Creatinine continues to uptrend.  Blood pressure was low 48 hours ago but has since improved.  Clinically she is improved during his hospital stay.  She has no specific complaints for me..

## 2024-04-10 NOTE — CARE PLAN
The patient's goals for the shift include  adequate rest    The clinical goals for the shift include no complaints of pain to R heel and ambulating as tolerated.    -R heel wound changed per orders, pt tolerated well.     1530: Pt got dizzy while ambulating from BSC to bed, MD notified. Going to get 12 lead EKG per orders.

## 2024-04-10 NOTE — PROGRESS NOTES
"Physical Therapy    Physical Therapy Treatment    Patient Name: Magi Boyd  MRN: [id number]  Today's Date: 4/10/2024  Time Calculation  Start Time: 1428  Stop Time: 1506  Time Calculation (min): 38 min       Assessment/Plan   PT Assessment  PT Assessment Results: Decreased strength, Decreased range of motion, Impaired balance, Decreased mobility  End of Session Communication: Bedside nurse  Assessment Comment: patietn on bedside commode,  outside door, nursing assistant finishing with patietn, call light in reach     PT Plan  Treatment/Interventions: Bed mobility, Transfer training, Gait training, Balance training, Strengthening, Therapeutic activity, Therapeutic exercise, Home exercise program, Orthotic fitting/training  PT Plan: Skilled PT  PT Frequency: 3 times per week  PT Discharge Recommendations: Moderate intensity level of continued care  Equipment Recommended upon Discharge: Wheeled walker  PT Recommended Transfer Status: Assist x1  PT - OK to Discharge: Yes      General Visit Information:   PT  Visit  PT Received On: 04/10/24  General  Reason for Referral: Pt is a 79 year old female referred to PT for R foot sx, impaired mobility, impaired ADL  Referred By: Mary Tucker MD  Prior to Session Communication: Bedside nurse  Patient Position Received: Bed, 3 rail up, Alarm on  Preferred Learning Style: verbal  General Comment: AXOX3, Cam boot on RLE, requesting to go to the bathroom, Pt pleasant and agreeable to PT morbidly obese, WBAT with cam boot RLE.    Subjective   Precautions:     Vital Signs:       Objective   Pain:  Pain Assessment  Pain Assessment: 0-10  Pain Score:  (\"0-3.10 right heel, but my right shoulder really hurt today and I had to ask the nurse for some pain medicine which I took a little while ago\" stated patient.)  Cognition:  Cognition  Overall Cognitive Status: Within Functional Limits  Orientation Level: Oriented X4  Postural Control:     Extremity/Trunk " "Assessments:    Activity Tolerance:  Activity Tolerance  Endurance: Tolerates 30 min exercise with multiple rests  Treatments:       Therapeutic Activity  Therapeutic Activity Performed: Yes  Therapeutic Activity 1: patient had c/o \"feeling like my foot is crooked in the boot\". Unable to don/doff by self but, requires Max A x1 (Doffed right cam boot, ace wrap and heel srtessing twisted. re-applied ace wrap and straghtenind heel dressing then donned cam boot, with demonstration and instruction regarading smooth dressing sopcks and placement into cam boot. p.t. verb aunderstaning)              Modalities  Modalities Used: Yes    Bed Mobility  Bed Mobility: Yes  Bed Mobility 1  Bed Mobility 1: Supine to sitting, Scooting  Level of Assistance 1: Minimum assistance, Minimal verbal cues    Ambulation/Gait Training  Ambulation/Gait Training Performed: Yes  Ambulation/Gait Training 1  Surface 1: Level tile  Device 1: Rolling walker  Gait Support Devices:  (right foot cam boot, and right knee valgus)  Assistance 1: Moderate assistance, Minimum assistance, Minimal verbal cues  Quality of Gait 1:  (step to stand pivot x5 steps bed to chair with Mod to Min A x1)  Transfers  Transfer: Yes  Transfer 1  Transfer From 1: Sit to  Technique 1: Sit to stand  Transfer Level of Assistance 1: Moderate assistance, Minimum assistance, Moderate tactile cues, Minimal tactile cues, Moderate verbal cues, Minimal verbal cues                     Outcome Measures:  2 - turning from your back to your side while in a flat bed without using bedrails  3- moving from lying on your back to sitting on the side of a flat bed without using bedrails  2- moving to and from bed to chair (including a wheelchair)  2- standing up from a chair using your your arms (e.g. wheelchair or bedside chair)  2- to walk in hospital room  2-climbing 3-5 steps with railing    13 basic mobility total score          Education Documentation  Handouts, taught by Lubna Rodriguez, " PTA at 4/10/2024  4:11 PM.  Learner: Patient  Readiness: Nonacceptance  Method: Explanation  Response: Needs Reinforcement    Body Mechanics, taught by Lubna Rodriguez PTA at 4/10/2024  4:11 PM.  Learner: Patient  Readiness: Nonacceptance  Method: Explanation  Response: Needs Reinforcement    Home Exercise Program, taught by Lubna Rodriguez PTA at 4/10/2024  4:11 PM.  Learner: Patient  Readiness: Nonacceptance  Method: Explanation  Response: Needs Reinforcement    Precautions, taught by Lubna Rodriguez PTA at 4/10/2024  4:11 PM.  Learner: Patient  Readiness: Nonacceptance  Method: Explanation  Response: Needs Reinforcement    Mobility Training, taught by Lubna Rodriguez PTA at 4/10/2024  4:11 PM.  Learner: Patient  Readiness: Nonacceptance  Method: Explanation  Response: Needs Reinforcement    Education Comments  No comments found.        OP EDUCATION:       Encounter Problems       Encounter Problems (Active)       Mobility       STG - Patient will ambulate 50ft with FWW (Progressing)       Start:  04/06/24    Expected End:  04/10/24            Pt will complete 15 reps of BLE therex to improve strength (Progressing)       Start:  04/06/24    Expected End:  04/10/24               PT Transfers       STG - Patient will perform bed mobility Kelly (Progressing)       Start:  04/06/24    Expected End:  04/10/24            STG - Patient will transfer sit to and from stand with FWW with SBA (Progressing)       Start:  04/06/24    Expected End:  04/10/24               Safety       Pt will independently don/doff CAM boot (Progressing)       Start:  04/06/24    Expected End:  04/10/24

## 2024-04-10 NOTE — SIGNIFICANT EVENT
Called to bedside by RN at 1531, patient was lightheaded and nauseous with pallor. She had been lying in bed with a systolic pressure of 130. She had to use the rest room so transferred to bedside commode with the help of the STNA. When there, she felt lightheaded with nausea and near syncopal. STNA noted that she was pale and tachycardic Her systolic blood pressure was noted to have dropped 30 points to ~106. Symptoms improved by the time she was assessed at 15:33, color had returned and her lightheadedness was improved. She stated that she had been up to the chair earlier in the day and experienced some lightheadedness, but nothing as severe as this event.

## 2024-04-10 NOTE — PROGRESS NOTES
Magi Boyd is a 79 y.o. female on day 7 of admission presenting with Failure of outpatient treatment.    Subjective   Interval History: no fever, no new complaints        Review of Systems    Objective   Range of Vitals (last 24 hours)  Heart Rate:  [61-65]   Temp:  [36.3 °C (97.3 °F)-36.5 °C (97.7 °F)]   Resp:  [14-22]   BP: (114-139)/(66-74)   SpO2:  [93 %-94 %]   Daily Weight  04/03/24 : 104 kg (229 lb)    Body mass index is 40.57 kg/m².    Physical Exam  Constitutional:       Appearance: Normal appearance.   HENT:      Head: Normocephalic and atraumatic.      Mouth/Throat:      Mouth: Mucous membranes are moist.      Pharynx: Oropharynx is clear.   Eyes:      Pupils: Pupils are equal, round, and reactive to light.   Cardiovascular:      Rate and Rhythm: Normal rate and regular rhythm.      Heart sounds: Normal heart sounds.   Pulmonary:      Effort: Pulmonary effort is normal.      Breath sounds: Normal breath sounds.   Abdominal:      General: Abdomen is flat. Bowel sounds are normal.      Palpations: Abdomen is soft.   Musculoskeletal:      Cervical back: Normal range of motion.      Comments: Stasis  Rt heel wound   Neurological:      Mental Status: She is alert.         Antibiotics  cefepime (Maxipime) IV 2 g  vancomycin (Vancocin) 2000 mg/500 ml in D5W 500 mL IV piggyback 2,000 mg  metroNIDAZOLE (Flagyl) 500 mg in NaCl (iso-os) 100 mL  heparin (porcine) injection 7,500 Units  polyethylene glycol (Glycolax, Miralax) packet 17 g  atenolol (Tenormin) tablet 100 mg  atorvastatin (Lipitor) tablet 20 mg  fluticasone furoate-vilanteroL (Breo Ellipta) 100-25 mcg/dose inhaler 1 puff  famotidine-Ca carb-mag hydrox (Pepcid Complete) -165 mg chewable tablet 1 tablet  febuxostat (Uloric) tablet 40 mg  ferrous gluconate (Fergon) 324 (38 Fe) mg tablet 324 mg  furosemide (Lasix) tablet 40 mg  levothyroxine (Synthroid, Levoxyl) tablet 75 mcg  oxybutynin (Ditropan) tablet 5 mg  solifenacin (VESIcare) tablet 5  mg  traMADol (Ultram) tablet  famotidine (Pepcid) tablet 10 mg  alum-mag hydroxide-simeth (Mylanta) 200-200-20 mg/5 mL oral suspension 30 mL  cefepime (Maxipime) 1 g in dextrose 5% 50 mL IV  vancomycin (Vancocin) pharmacy to dose - pharmacy monitoring  acyclovir (Zovirax) capsule 400 mg  traMADol (Ultram) tablet 50 mg  ceFAZolin in dextrose (iso-os) (Ancef) IVPB 1 g      Relevant Results  Labs  Results from last 72 hours   Lab Units 04/10/24  0650 04/09/24  0718 04/08/24 0612   WBC AUTO x10*3/uL 6.7 6.0 6.6   HEMOGLOBIN g/dL 10.0* 10.1* 10.4*   HEMATOCRIT % 31.5* 32.6* 33.1*   PLATELETS AUTO x10*3/uL 239 225 245     Results from last 72 hours   Lab Units 04/10/24  0650 04/09/24  0718 04/08/24 0612   SODIUM mmol/L 135* 137 138   POTASSIUM mmol/L 3.8 3.6 3.8   CHLORIDE mmol/L 101 102 102   CO2 mmol/L 28 27 29   BUN mg/dL 31* 30* 27*   CREATININE mg/dL 3.48* 3.25* 2.80*   GLUCOSE mg/dL 81 84 80   CALCIUM mg/dL 9.3 9.2 8.8   ANION GAP mmol/L 10 12 11   EGFR mL/min/1.73m*2 13* 14* 17*   PHOSPHORUS mg/dL 3.0 3.2  --      Results from last 72 hours   Lab Units 04/10/24  0650 04/09/24  0718 04/08/24 0612   ALK PHOS U/L  --   --  53   BILIRUBIN TOTAL mg/dL  --   --  0.3   PROTEIN TOTAL g/dL  --   --  5.5*   ALT U/L  --   --  5*   AST U/L  --   --  31   ALBUMIN g/dL 2.7* 2.6* 2.6*     Estimated Creatinine Clearance: 15.6 mL/min (A) (by C-G formula based on SCr of 3.48 mg/dL (H)).  C-Reactive Protein   Date Value Ref Range Status   04/03/2024 2.55 (H) <1.00 mg/dL Final     Microbiology  Reviewed  Imaging  Reviewed        Assessment/Plan   Right heel none healing wound / possible infection, the MRI was reviewed, no bony erosions sp bedside debridement, the culture with rare mixed skin monisha  Healing Lt thoracic zoster     Recommendations :  Continue Cefazolin, plan on a short course of oral Keflex x 5 days with discharge  Discussed with the wound care team     I spent minutes in the professional and overall care of this  patient.      Christopher Sharp MD

## 2024-04-11 PROBLEM — Z78.9 FAILURE OF OUTPATIENT TREATMENT: Status: RESOLVED | Noted: 2024-04-03 | Resolved: 2024-04-11

## 2024-04-11 LAB
ALBUMIN SERPL BCP-MCNC: 2.6 G/DL (ref 3.4–5)
ANION GAP SERPL CALC-SCNC: 10 MMOL/L (ref 10–20)
BUN SERPL-MCNC: 32 MG/DL (ref 6–23)
CALCIUM SERPL-MCNC: 9.3 MG/DL (ref 8.6–10.3)
CHLORIDE SERPL-SCNC: 103 MMOL/L (ref 98–107)
CO2 SERPL-SCNC: 27 MMOL/L (ref 21–32)
CREAT SERPL-MCNC: 3.31 MG/DL (ref 0.5–1.05)
EGFRCR SERPLBLD CKD-EPI 2021: 14 ML/MIN/1.73M*2
ERYTHROCYTE [DISTWIDTH] IN BLOOD BY AUTOMATED COUNT: 13.1 % (ref 11.5–14.5)
GLUCOSE SERPL-MCNC: 80 MG/DL (ref 74–99)
HCT VFR BLD AUTO: 30.3 % (ref 36–46)
HGB BLD-MCNC: 9.7 G/DL (ref 12–16)
MAGNESIUM SERPL-MCNC: 1.82 MG/DL (ref 1.6–2.4)
MCH RBC QN AUTO: 30.4 PG (ref 26–34)
MCHC RBC AUTO-ENTMCNC: 32 G/DL (ref 32–36)
MCV RBC AUTO: 95 FL (ref 80–100)
NRBC BLD-RTO: 0 /100 WBCS (ref 0–0)
PHOSPHATE SERPL-MCNC: 3.1 MG/DL (ref 2.5–4.9)
PLATELET # BLD AUTO: 215 X10*3/UL (ref 150–450)
POTASSIUM SERPL-SCNC: 3.8 MMOL/L (ref 3.5–5.3)
RBC # BLD AUTO: 3.19 X10*6/UL (ref 4–5.2)
SODIUM SERPL-SCNC: 136 MMOL/L (ref 136–145)
WBC # BLD AUTO: 6.8 X10*3/UL (ref 4.4–11.3)

## 2024-04-11 PROCEDURE — 85027 COMPLETE CBC AUTOMATED: CPT

## 2024-04-11 PROCEDURE — 83735 ASSAY OF MAGNESIUM: CPT

## 2024-04-11 PROCEDURE — 2500000004 HC RX 250 GENERAL PHARMACY W/ HCPCS (ALT 636 FOR OP/ED)

## 2024-04-11 PROCEDURE — 2500000001 HC RX 250 WO HCPCS SELF ADMINISTERED DRUGS (ALT 637 FOR MEDICARE OP)

## 2024-04-11 PROCEDURE — 97535 SELF CARE MNGMENT TRAINING: CPT | Mod: GO,CO

## 2024-04-11 PROCEDURE — 80069 RENAL FUNCTION PANEL: CPT

## 2024-04-11 PROCEDURE — 99232 SBSQ HOSP IP/OBS MODERATE 35: CPT | Performed by: STUDENT IN AN ORGANIZED HEALTH CARE EDUCATION/TRAINING PROGRAM

## 2024-04-11 PROCEDURE — 2500000001 HC RX 250 WO HCPCS SELF ADMINISTERED DRUGS (ALT 637 FOR MEDICARE OP): Performed by: STUDENT IN AN ORGANIZED HEALTH CARE EDUCATION/TRAINING PROGRAM

## 2024-04-11 PROCEDURE — 36415 COLL VENOUS BLD VENIPUNCTURE: CPT

## 2024-04-11 PROCEDURE — 97530 THERAPEUTIC ACTIVITIES: CPT | Mod: GO,CO

## 2024-04-11 PROCEDURE — 1100000001 HC PRIVATE ROOM DAILY

## 2024-04-11 RX ORDER — FUROSEMIDE 20 MG/1
40 TABLET ORAL DAILY PRN
Qty: 30 TABLET | Refills: 0 | Status: SHIPPED | OUTPATIENT
Start: 2024-04-11 | End: 2024-05-11

## 2024-04-11 RX ORDER — EAR PLUGS
1 EACH OTIC (EAR) 2 TIMES DAILY
Status: DISCONTINUED | OUTPATIENT
Start: 2024-04-11 | End: 2024-04-13 | Stop reason: HOSPADM

## 2024-04-11 RX ADMIN — ATORVASTATIN CALCIUM 20 MG: 20 TABLET, FILM COATED ORAL at 21:47

## 2024-04-11 RX ADMIN — HEPARIN SODIUM 7500 UNITS: 5000 INJECTION INTRAVENOUS; SUBCUTANEOUS at 21:47

## 2024-04-11 RX ADMIN — COLLAGENASE SANTYL: 250 OINTMENT TOPICAL at 10:59

## 2024-04-11 RX ADMIN — FERROUS GLUCONATE 324 MG: 324 TABLET ORAL at 21:47

## 2024-04-11 RX ADMIN — FEBUXOSTAT 40 MG: 40 TABLET ORAL at 08:56

## 2024-04-11 RX ADMIN — HEPARIN SODIUM 7500 UNITS: 5000 INJECTION INTRAVENOUS; SUBCUTANEOUS at 05:17

## 2024-04-11 RX ADMIN — LEVOTHYROXINE SODIUM 75 MCG: 75 TABLET ORAL at 05:17

## 2024-04-11 RX ADMIN — Medication 1 APPLICATION: at 21:48

## 2024-04-11 RX ADMIN — Medication 1 APPLICATION: at 09:10

## 2024-04-11 RX ADMIN — HEPARIN SODIUM 7500 UNITS: 5000 INJECTION INTRAVENOUS; SUBCUTANEOUS at 15:48

## 2024-04-11 RX ADMIN — FERROUS GLUCONATE 324 MG: 324 TABLET ORAL at 08:55

## 2024-04-11 RX ADMIN — FLUTICASONE FUROATE AND VILANTEROL TRIFENATATE 1 PUFF: 100; 25 POWDER RESPIRATORY (INHALATION) at 08:57

## 2024-04-11 ASSESSMENT — COGNITIVE AND FUNCTIONAL STATUS - GENERAL
MOVING FROM LYING ON BACK TO SITTING ON SIDE OF FLAT BED WITH BEDRAILS: A LOT
TOILETING: A LOT
PERSONAL GROOMING: A LITTLE
PERSONAL GROOMING: A LITTLE
MOBILITY SCORE: 13
CLIMB 3 TO 5 STEPS WITH RAILING: A LOT
WALKING IN HOSPITAL ROOM: A LOT
CLIMB 3 TO 5 STEPS WITH RAILING: A LOT
STANDING UP FROM CHAIR USING ARMS: A LOT
DRESSING REGULAR LOWER BODY CLOTHING: A LOT
DAILY ACTIVITIY SCORE: 16
TURNING FROM BACK TO SIDE WHILE IN FLAT BAD: A LITTLE
MOVING FROM LYING ON BACK TO SITTING ON SIDE OF FLAT BED WITH BEDRAILS: A LOT
DRESSING REGULAR UPPER BODY CLOTHING: A LITTLE
PERSONAL GROOMING: A LITTLE
TOILETING: A LOT
TOILETING: A LOT
TURNING FROM BACK TO SIDE WHILE IN FLAT BAD: A LITTLE
MOVING TO AND FROM BED TO CHAIR: A LOT
DRESSING REGULAR UPPER BODY CLOTHING: A LITTLE
DRESSING REGULAR LOWER BODY CLOTHING: A LOT
MOVING TO AND FROM BED TO CHAIR: A LOT
HELP NEEDED FOR BATHING: A LOT
STANDING UP FROM CHAIR USING ARMS: A LOT
DRESSING REGULAR LOWER BODY CLOTHING: A LOT
MOBILITY SCORE: 13
DAILY ACTIVITIY SCORE: 16
DAILY ACTIVITIY SCORE: 16
HELP NEEDED FOR BATHING: A LOT
DRESSING REGULAR UPPER BODY CLOTHING: A LITTLE
WALKING IN HOSPITAL ROOM: A LOT
HELP NEEDED FOR BATHING: A LOT

## 2024-04-11 ASSESSMENT — PAIN - FUNCTIONAL ASSESSMENT
PAIN_FUNCTIONAL_ASSESSMENT: 0-10
PAIN_FUNCTIONAL_ASSESSMENT: 0-10

## 2024-04-11 ASSESSMENT — ACTIVITIES OF DAILY LIVING (ADL): HOME_MANAGEMENT_TIME_ENTRY: 16

## 2024-04-11 ASSESSMENT — PAIN SCALES - GENERAL
PAINLEVEL_OUTOF10: 0 - NO PAIN

## 2024-04-11 NOTE — PROGRESS NOTES
Subjective    Magi Boyd is a 79 y.o. female presenting for infected pressure ulcer. Pt doing well, no pain, difficult to ambulate d/t lightheadedness. Working on PO intake, still has low urine output. Noted some pruritus in RLE.    Objective    Physical Exam  Constitutional:       General: She is not in acute distress.     Appearance: Normal appearance. She is obese. She is not ill-appearing.   HENT:      Head: Normocephalic and atraumatic.      Nose: Nose normal.      Mouth/Throat:      Mouth: Mucous membranes are moist.      Pharynx: Oropharynx is clear.   Cardiovascular:      Rate and Rhythm: Normal rate and regular rhythm.      Heart sounds: Normal heart sounds. No murmur heard.     No friction rub. No gallop.   Pulmonary:      Effort: Pulmonary effort is normal. No respiratory distress.      Breath sounds: Normal breath sounds. No wheezing, rhonchi or rales.   Abdominal:      General: Abdomen is flat. Bowel sounds are normal. There is no distension.      Palpations: Abdomen is soft.      Tenderness: There is no abdominal tenderness. There is no guarding or rebound.   Musculoskeletal:         General: Swelling and signs of injury present.      Comments: RLE in boot, dressed and wrapped.   Skin:     General: Skin is warm and dry.      Capillary Refill: Capillary refill takes less than 2 seconds.   Neurological:      General: No focal deficit present.      Mental Status: She is alert and oriented to person, place, and time.     Temp:  [36.2 °C (97.2 °F)-36.6 °C (97.8 °F)] 36.6 °C (97.8 °F)  Heart Rate:  [60-65] 65  Resp:  [17-20] 18  BP: ()/(60-77) 130/70    Vitals:    04/03/24 1610   Weight: 104 kg (229 lb)     I/Os    Intake/Output Summary (Last 24 hours) at 4/11/2024 1327  Last data filed at 4/11/2024 0900  Gross per 24 hour   Intake 270 ml   Output 400 ml   Net -130 ml     Labs:   Results from last 72 hours   Lab Units 04/11/24  0647 04/10/24  0650 04/09/24  0718   SODIUM mmol/L 136 135* 137    POTASSIUM mmol/L 3.8 3.8 3.6   CHLORIDE mmol/L 103 101 102   CO2 mmol/L 27 28 27   BUN mg/dL 32* 31* 30*   CREATININE mg/dL 3.31* 3.48* 3.25*   GLUCOSE mg/dL 80 81 84   CALCIUM mg/dL 9.3 9.3 9.2   ANION GAP mmol/L 10 10 12   EGFR mL/min/1.73m*2 14* 13* 14*   PHOSPHORUS mg/dL 3.1 3.0 3.2      Results from last 72 hours   Lab Units 04/11/24  0647 04/10/24  0650 04/09/24  0718   WBC AUTO x10*3/uL 6.8 6.7 6.0   HEMOGLOBIN g/dL 9.7* 10.0* 10.1*   HEMATOCRIT % 30.3* 31.5* 32.6*   PLATELETS AUTO x10*3/uL 215 239 225      Lab Results   Component Value Date    CALCIUM 9.3 04/11/2024    PHOS 3.1 04/11/2024      Lab Results   Component Value Date    CRP 2.55 (H) 04/03/2024      Micro/ID:   Susceptibility data from last 90 days.  Collected Specimen Info Organism Amoxicillin/Clavulanate Ampicillin Ampicillin/Sulbactam Cefazolin Cefepime Ciprofloxacin Gentamicin Nitrofurantoin Piperacillin/Tazobactam Trimethoprim/Sulfamethoxazole   04/06/24 Tissue/Biopsy from Wound/Tissue Mixed Skin Microorganisms              03/14/24 Urine from Clean Catch/Voided Enterobacter cloacae complex R R R R S S S S S R     Lab Results   Component Value Date    URINECULTURE >100,000 Enterobacter cloacae complex (A) 03/14/2024     Images:  US renal complete  Narrative: Interpreted By:  An DeL eon,   STUDY:  US RENAL COMPLETE; 4/9/2024 5:12 pm      INDICATION:  Signs/Symptoms:worsening renal function, oliguria, rule out  obstruction.      COMPARISON:  08/23/2021      ACCESSION NUMBER(S):  [id number]      ORDERING CLINICIAN:  JANELL JOLLEY      TECHNIQUE:  Grayscale and color Doppler imaging of the kidneys.      FINDINGS:  The right kidney measures 9.7cm  The left kidney measures 10.0cm  There is no shadowing calculus, hydronephrosis, or solid mass  identified. Mild renal cortical thinning bilaterally. Cortical  echogenicity is normal. Urinary bladder is decompressed. Bilateral  ureteral jets are noted      Impression: 1. Mild bilateral renal cortical  thinning. No hydronephrosis      .      MACRO:  None.      Signed by: An De Leon 4/9/2024 7:59 PM  Dictation workstation:   [id number]  Transthoracic Echo (TTE) Complete     [facility], [address] [place] [address]                Tel [phone] and Fax [phone]    TRANSTHORACIC ECHOCARDIOGRAM REPORT       Patient Name:      CHANDNI RODRIGUEZ   Reading Physician:    [id number] Crawford MD  Study Date:        4/9/2024             Ordering Provider:    [id number] TARIQ HENDERSON  MRN/PID:           [id number]             Fellow:  Accession#:        [id number]         Nurse:  Date of Birth/Age: 1945 / 79 years Sonographer:          Melinda Patrick RDCS  Gender:            F                    Additional Staff:  Height:            160.02 cm            Admit Date:           4/3/2024  Weight:            103.87 kg            Admission Status:     Inpatient -                                                                Routine  BSA / BMI:         2.05 m2 / 40.57      Encounter#:           [id number]                     kg/m2                                          Department Location:  [facility] Non                                                                Invasive  Blood Pressure: 110 /68 mmHg    Study Type:    TRANSTHORACIC ECHO (TTE) COMPLETE  Diagnosis/ICD: Localized edema-R60.0  Indication:    Localized edema  CPT Code:      Echo Complete w Full Doppler-[id number]    Patient History:  Pertinent History: HTN and CKD stage IV, Gout, GERD, rt heel wound.    Study Detail: The following Echo studies were performed: 2D, M-Mode, Doppler and                color flow. Technically challenging study due to patient lying in                supine position. Unable to obtain suprasternal notch view. The                 patient was awake.       PHYSICIAN INTERPRETATION:  Left Ventricle: The left ventricular systolic function is low normal, with an estimated ejection fraction of 50-55%. The left ventricular cavity size is upper limits of normal. Spectral Doppler shows an impaired relaxation pattern of left ventricular diastolic filling.  Left Atrium: The left atrium is normal in size.  Right Ventricle: The right ventricle is mildly enlarged. There is normal right ventricular global systolic function.  Right Atrium: The right atrium is normal in size.  Aortic Valve: The aortic valve was not well visualized. There is no evidence of aortic valve stenosis.  There is no evidence of aortic valve regurgitation. The peak instantaneous gradient of the aortic valve is 6.5 mmHg. The mean gradient of the aortic valve is 3.8 mmHg.  Mitral Valve: The mitral valve is normal in structure. There is trace mitral valve regurgitation.  Tricuspid Valve: The tricuspid valve is structurally normal. There is trace tricuspid regurgitation. The Doppler estimated RVSP is within normal limits at 26.0 mmHg.  Pulmonic Valve: The pulmonic valve is not well visualized. The pulmonic valve regurgitation was not well visualized.  Pericardium: There is no pericardial effusion noted.  Aorta: The aortic root was not well visualized.  Systemic Veins: The inferior vena cava appears to be of normal size. There is IVC inspiratory collapse greater than 50%.       CONCLUSIONS:   1. Left ventricular systolic function is low normal with a 50-55% estimated ejection fraction.   2. Poorly visualized anatomical structures due to suboptimal image quality.   3. Spectral Doppler shows an impaired relaxation pattern of left ventricular diastolic filling.   4. RVSP within normal limits.   5. Aortic valve stenosis is not present.    QUANTITATIVE DATA SUMMARY:  2D MEASUREMENTS:                            Normal Ranges:  IVSd:          1.12 cm    (0.6-1.1cm)  LVPWd:          1.29 cm    (0.6-1.1cm)  LVIDd:         5.25 cm    (3.9-5.9cm)  LVIDs:         3.24 cm  LV Mass Index: 124.4 g/m2  LV % FS        38.3 %    LA VOLUME:                                Normal Ranges:  LA Vol A4C:        28.7 ml    (22+/-6mL/m2)  LA Vol A2C:        40.5 ml  LA Vol BP:         36.9 ml  LA Vol Index A4C:  14.0 ml/m2  LA Vol Index A2C:  19.8 ml/m2  LA Vol Index BP:   18.0 ml/m2  LA Area A4C:       13.5 cm2  LA Area A2C:       14.8 cm2  LA Major Axis A4C: 5.4 cm  LA Major Axis A2C: 4.6 cm  LA Volume Index:   18.1 ml/m2  LA Vol A4C:        25.5 ml  LA Vol A2C:        40.9 ml    RA VOLUME BY A/L METHOD:                        Normal Ranges:  RA Area A4C: 18.3 cm2    LV SYSTOLIC FUNCTION BY 2D PLANIMETRY (MOD):                      Normal Ranges:  EF-A4C View: 50.1 % (>=55%)  EF-A2C View: 64.0 %  EF-Biplane:  52.0 %    LV DIASTOLIC FUNCTION:                            Normal Ranges:  MV Peak E:    0.57 m/s    (0.7-1.2 m/s)  MV Peak A:    0.65 m/s    (0.42-0.7 m/s)  E/A Ratio:    0.88        (1.0-2.2)  MV e'         0.08 m/s    (>8.0)  MV lateral e' 0.14 m/s  MV medial e'  0.08 m/s  MV A Dur:     162.70 msec  E/e' Ratio:   7.13        (<8.0)    MITRAL VALVE:                  Normal Ranges:  MV DT: 164 msec (150-240msec)    AORTIC VALVE:                                    Normal Ranges:  AoV Vmax:                1.28 m/s (<=1.7m/s)  AoV Peak P.5 mmHg (<20mmHg)  AoV Mean PG:             3.8 mmHg (1.7-11.5mmHg)  LVOT Max Eben:            1.23 m/s (<=1.1m/s)  AoV VTI:                 24.86 cm (18-25cm)  LVOT VTI:                25.18 cm  LVOT Diameter:           1.90 cm  (1.8-2.4cm)  AoV Area, VTI:           2.88 cm2 (2.5-5.5cm2)  AoV Area,Vmax:           2.74 cm2 (2.5-4.5cm2)  AoV Dimensionless Index: 1.01       RIGHT VENTRICLE:  RV Basal 4.50 cm  RV Mid   3.10 cm  RV Major 7.1 cm  TAPSE:   24.0 mm  RV s'    0.12 m/s    TRICUSPID VALVE/RVSP:                              Normal Ranges:  Peak TR  Velocity: 2.40 m/s  RV Syst Pressure: 26.0 mmHg (< 30mmHg)  IVC Diam:         1.80 cm    PULMONIC VALVE:                       Normal Ranges:  PV Max Eben: 1.0 m/s  (0.6-0.9m/s)  PV Max P.3 mmHg       [id number] Oneil Crawford MD  Electronically signed on 2024 at 3:13:38 PM       ** Final **     Meds:  Scheduled medications  [Held by provider] atenolol, 100 mg, oral, Daily  atorvastatin, 20 mg, oral, Daily  collagenase, , Topical, Daily  famotidine, 10 mg, oral, Every other day  febuxostat, 40 mg, oral, Daily  ferrous gluconate, 324 mg, oral, BID  fluticasone furoate-vilanteroL, 1 puff, inhalation, Daily  [Held by provider] furosemide, 40 mg, oral, Daily  heparin (porcine), 7,500 Units, subcutaneous, q8h IMANI  levothyroxine, 75 mcg, oral, Daily  [Held by provider] oxybutynin, 5 mg, oral, Nightly  zinc oxide, 1 Application, Topical, BID      PRN medications  PRN medications: acetaminophen, alum-mag hydroxide-simeth, ondansetron, polyethylene glycol, traMADol     Assessment    Magi Boyd is a 79 y.o. female presenting for malodorous drainage from foot ulcer, treated for cellulitis and pressure ulcer. Hx of gout, HTN, chronic venous insufficiency, CKD 4, hypothyroidism, overactive bladder, and asthma. Found to have varicella infection on admission. Developed JAKI and oliguria, which improved with cessation of diuresis and antibiotics.    Acute medical issues  # R heel pressure ulcer stage II vs stage III s/p debridement on   # R heel cellulitis  # peripheral edema improving  - Weight bearing on R foot with fracture boot per podiatry  - Wound care per podiatry  - Pending SNF    # Azotemia  # Oliguria  :: CKD4 follows with Dr Narayan   :: Low PO intake vs overdiuresis vs obstructive  - Urine lytes, urine albumin, osm, serum osm WNL  - Hold PO furosemide 40mg  - Nephrology following, OK for DC with furosemide for weight gain >2kg  - Renal US shows cortical thickening without hydronephrosis    Resolved medical  issues  # VZV infection  - Present in single dermatome  - Completed acyclovir treatment    Chronic medical issues  # Gout  - Hold febuxostat 40mg    # Fe defficiency  - Ferrous gluconate 324mg    # Asthma  - Continue home inhalers (breo)    # HTN  - Hold atenolol 100mg    # HLD  - Atorvastatin 20mg    # Overactive bladder  - Hold oxybutinin 5mg    # Hypothyroidism  - Levothyroxine 75ug    F PO  E Replete as needed  N Regular diet  G Famotidine  DVT: Sub Q heparin  Abx: Cefazolin  Drips: None  Code status: Full code  NoK:  Primary Emergency Contact: Ventura Boyd    Dispo: 79 F presenting with pressure ulcer and cellulitis s/p debridement 4/6. DC to SNF pending precert.    Christiano Roman MD

## 2024-04-11 NOTE — PROGRESS NOTES
Magi Boyd is a 79 y.o. female on day 8 of admission presenting with Failure of outpatient treatment.    Subjective   Right heel pain is controlled.  Having issues with dizziness when getting up.       Meds:  Scheduled medications  [Held by provider] atenolol, 100 mg, oral, Daily  atorvastatin, 20 mg, oral, Daily  collagenase, , Topical, Daily  famotidine, 10 mg, oral, Every other day  febuxostat, 40 mg, oral, Daily  ferrous gluconate, 324 mg, oral, BID  fluticasone furoate-vilanteroL, 1 puff, inhalation, Daily  [Held by provider] furosemide, 40 mg, oral, Daily  heparin (porcine), 7,500 Units, subcutaneous, q8h IMANI  levothyroxine, 75 mcg, oral, Daily  [Held by provider] oxybutynin, 5 mg, oral, Nightly  zinc oxide, 1 Application, Topical, BID      Continuous medications     PRN medications  PRN medications: acetaminophen, alum-mag hydroxide-simeth, ondansetron, polyethylene glycol, traMADol    Physical Exam   Constitutional:       Appearance: Normal appearance.   HENT:      Head: Normocephalic and atraumatic.      Mouth/Throat:      Mouth: Mucous membranes are moist.      Pharynx: Oropharynx is clear.   Eyes:      Pupils: Pupils are equal, round, and reactive to light.   Cardiovascular:      Rate and Rhythm: Normal rate and regular rhythm.      Heart sounds: Normal heart sounds.   Pulmonary:      Effort: Pulmonary effort is normal.      Breath sounds: Normal breath sounds.   Abdominal:      General: Abdomen is flat. Bowel sounds are normal.      Palpations: Abdomen is soft.   Musculoskeletal:      Cervical back: Normal range of motion.      Comments: ulceration to right plantar heel measuring  3 x 2 cm;  90% slough ; no malodor or fluctuance;  no probe to bone;  serosanguinous drainage. Palpable pedal pulses/  Skin:     Comments: Lt thoracic healed zoster    Neurological:      Mental Status: She is alert.      Last Recorded Vitals  Blood pressure 85/60, pulse 65, temperature 36.6 °C (97.8 °F), temperature  "source Temporal, resp. rate 18, height 1.6 m (5' 3\"), weight 104 kg (229 lb), SpO2 95%.    Intake/Output last 3 Shifts:  I/O last 3 completed shifts:  In: 400 (3.9 mL/kg) [P.O.:400]  Out: 700 (6.7 mL/kg) [Urine:700 (0.2 mL/kg/hr)]  Weight: 103.9 kg     Relevant Results   Susceptibility data from last 90 days.  Collected Specimen Info Organism Amoxicillin/Clavulanate Ampicillin Ampicillin/Sulbactam Cefazolin Cefepime Ciprofloxacin Gentamicin Nitrofurantoin Piperacillin/Tazobactam Trimethoprim/Sulfamethoxazole   04/06/24 Tissue/Biopsy from Wound/Tissue Mixed Skin Microorganisms              03/14/24 Urine from Clean Catch/Voided Enterobacter cloacae complex R R R R S S S S S R   US renal complete    Result Date: 4/9/2024  Interpreted By:  An De Leon, STUDY: US RENAL COMPLETE; 4/9/2024 5:12 pm   INDICATION: Signs/Symptoms:worsening renal function, oliguria, rule out obstruction.   COMPARISON: 08/23/2021   ACCESSION NUMBER(S): [id number]   ORDERING CLINICIAN: JANELL JOLLEY   TECHNIQUE: Grayscale and color Doppler imaging of the kidneys.   FINDINGS: The right kidney measures 9.7cm The left kidney measures 10.0cm There is no shadowing calculus, hydronephrosis, or solid mass identified. Mild renal cortical thinning bilaterally. Cortical echogenicity is normal. Urinary bladder is decompressed. Bilateral ureteral jets are noted       1. Mild bilateral renal cortical thinning. No hydronephrosis   .   MACRO: None.   Signed by: An De Leon 4/9/2024 7:59 PM Dictation workstation:   [id number]    Transthoracic Echo (TTE) Complete    Result Date: 4/9/2024   [facility], [address] [address]               Tel [phone] and Fax [phone] TRANSTHORACIC ECHOCARDIOGRAM REPORT  Patient Name:      CHANDNI RODRIGUEZ   Reading Physician:    [id number] Oneil Crawford MD Study Date:        4/9/2024             Ordering Provider:    [id number] TARIQ" SILVIA HENDERSON MRN/PID:           [id number]             Fellow: Accession#:        [id number]         Nurse: Date of Birth/Age: 1945 / 79 years Sonographer:          Melinda Patrick                                                               RDPATO Gender:            F                    Additional Staff: Height:            160.02 cm            Admit Date:           4/3/2024 Weight:            103.87 kg            Admission Status:     Inpatient -                                                               Routine BSA / BMI:         2.05 m2 / 40.57      Encounter#:           [id number]                    kg/m2                                         Department Location:  [facility] Non                                                               Invasive Blood Pressure: 110 /68 mmHg Study Type:    TRANSTHORACIC ECHO (TTE) COMPLETE Diagnosis/ICD: Localized edema-R60.0 Indication:    Localized edema CPT Code:      Echo Complete w Full Doppler-98563 Patient History: Pertinent History: HTN and CKD stage IV, Gout, GERD, rt heel wound. Study Detail: The following Echo studies were performed: 2D, M-Mode, Doppler and               color flow. Technically challenging study due to patient lying in               supine position. Unable to obtain suprasternal notch view. The               patient was awake.  PHYSICIAN INTERPRETATION: Left Ventricle: The left ventricular systolic function is low normal, with an estimated ejection fraction of 50-55%. The left ventricular cavity size is upper limits of normal. Spectral Doppler shows an impaired relaxation pattern of left ventricular diastolic filling. Left Atrium: The left atrium is normal in size. Right Ventricle: The right ventricle is mildly enlarged. There is normal right ventricular global systolic function. Right Atrium: The right atrium is normal in size. Aortic Valve: The aortic valve was not well  visualized. There is no evidence of aortic valve stenosis. There is no evidence of aortic valve regurgitation. The peak instantaneous gradient of the aortic valve is 6.5 mmHg. The mean gradient of the aortic valve is 3.8 mmHg. Mitral Valve: The mitral valve is normal in structure. There is trace mitral valve regurgitation. Tricuspid Valve: The tricuspid valve is structurally normal. There is trace tricuspid regurgitation. The Doppler estimated RVSP is within normal limits at 26.0 mmHg. Pulmonic Valve: The pulmonic valve is not well visualized. The pulmonic valve regurgitation was not well visualized. Pericardium: There is no pericardial effusion noted. Aorta: The aortic root was not well visualized. Systemic Veins: The inferior vena cava appears to be of normal size. There is IVC inspiratory collapse greater than 50%.  CONCLUSIONS:  1. Left ventricular systolic function is low normal with a 50-55% estimated ejection fraction.  2. Poorly visualized anatomical structures due to suboptimal image quality.  3. Spectral Doppler shows an impaired relaxation pattern of left ventricular diastolic filling.  4. RVSP within normal limits.  5. Aortic valve stenosis is not present. QUANTITATIVE DATA SUMMARY: 2D MEASUREMENTS:                           Normal Ranges: IVSd:          1.12 cm    (0.6-1.1cm) LVPWd:         1.29 cm    (0.6-1.1cm) LVIDd:         5.25 cm    (3.9-5.9cm) LVIDs:         3.24 cm LV Mass Index: 124.4 g/m2 LV % FS        38.3 % LA VOLUME:                               Normal Ranges: LA Vol A4C:        28.7 ml    (22+/-6mL/m2) LA Vol A2C:        40.5 ml LA Vol BP:         36.9 ml LA Vol Index A4C:  14.0 ml/m2 LA Vol Index A2C:  19.8 ml/m2 LA Vol Index BP:   18.0 ml/m2 LA Area A4C:       13.5 cm2 LA Area A2C:       14.8 cm2 LA Major Axis A4C: 5.4 cm LA Major Axis A2C: 4.6 cm LA Volume Index:   18.1 ml/m2 LA Vol A4C:        25.5 ml LA Vol A2C:        40.9 ml RA VOLUME BY A/L METHOD:                       Normal  Ranges: RA Area A4C: 18.3 cm2 LV SYSTOLIC FUNCTION BY 2D PLANIMETRY (MOD):                     Normal Ranges: EF-A4C View: 50.1 % (>=55%) EF-A2C View: 64.0 % EF-Biplane:  52.0 % LV DIASTOLIC FUNCTION:                           Normal Ranges: MV Peak E:    0.57 m/s    (0.7-1.2 m/s) MV Peak A:    0.65 m/s    (0.42-0.7 m/s) E/A Ratio:    0.88        (1.0-2.2) MV e'         0.08 m/s    (>8.0) MV lateral e' 0.14 m/s MV medial e'  0.08 m/s MV A Dur:     162.70 msec E/e' Ratio:   7.13        (<8.0) MITRAL VALVE:                 Normal Ranges: MV DT: 164 msec (150-240msec) AORTIC VALVE:                                   Normal Ranges: AoV Vmax:                1.28 m/s (<=1.7m/s) AoV Peak P.5 mmHg (<20mmHg) AoV Mean PG:             3.8 mmHg (1.7-11.5mmHg) LVOT Max Eben:            1.23 m/s (<=1.1m/s) AoV VTI:                 24.86 cm (18-25cm) LVOT VTI:                25.18 cm LVOT Diameter:           1.90 cm  (1.8-2.4cm) AoV Area, VTI:           2.88 cm2 (2.5-5.5cm2) AoV Area,Vmax:           2.74 cm2 (2.5-4.5cm2) AoV Dimensionless Index: 1.01  RIGHT VENTRICLE: RV Basal 4.50 cm RV Mid   3.10 cm RV Major 7.1 cm TAPSE:   24.0 mm RV s'    0.12 m/s TRICUSPID VALVE/RVSP:                             Normal Ranges: Peak TR Velocity: 2.40 m/s RV Syst Pressure: 26.0 mmHg (< 30mmHg) IVC Diam:         1.80 cm PULMONIC VALVE:                      Normal Ranges: PV Max Eben: 1.0 m/s  (0.6-0.9m/s) PV Max P.3 mmHg  [id number] Oneil Crawford MD Electronically signed on 2024 at 3:13:38 PM  ** Final **     Vascular US lower extremity venous duplex bilateral    Result Date: 2024          [place] [address]  Tel [phone] and Fax [phone]  Vascular Lab Report [facility] US LOWER EXTREMITY VENOUS DUPLEX BILATERAL  Patient Name:      CHANDNI Khalil Physician: [id number] Dayday De La Rosa DO Study Date:        2024            Ordering           [id number] BERNIE MCDANIEL                                         Physician:         AMANDA MRN/PID:           [id number]            Technologist:      Laila Aleman RVT Accession#:        [id number]        Technologist 2: Date of Birth/Age: 1945 / 79      Encounter#:        [id number]                    years Gender:            F Admission Status:  Inpatient           Location           [facility]                                        Performed:  Diagnosis/ICD: Localized (leg) edema-R60.0 CPT Codes:     59964 Peripheral venous duplex scan for DVT complete  CONCLUSIONS: Right Lower Venous: No evidence of acute deep vein thrombus visualized in the right lower extremity. Cannot rule out thrombus in non-visualized Peroneal vein due to edema. Right posterior tibial veins were visualized in segments due to edema; cannot rule out DVT in non-visualized segments. Left Lower Venous: No evidence of acute deep vein thrombus visualized in the left lower extremity. Cannot rule out thrombus in non-visualized peroneal vein. Left posterior tibial veins were visualized in segments due to edema; cannot rule out DVT in non-visualized segments.  Imaging & Doppler Findings:  Right                 Compressible Thrombus        Flow Distal External Iliac     Yes        None   Spontaneous/Phasic CFV                       Yes        None   Spontaneous/Phasic PFV                       Yes        None FV Proximal               Yes        None   Spontaneous/Phasic FV Mid                    Yes        None FV Distal                 Yes        None Popliteal                 Yes        None   Spontaneous/Phasic  Left                  Compress Thrombus        Flow Distal External Iliac   Yes      None   Spontaneous/Phasic CFV                     Yes      None   Spontaneous/Phasic PFV                     Yes      None FV Proximal             Yes      None   Spontaneous/Phasic FV Mid                  Yes      None FV Distal               Yes      None Popliteal                Yes      None   Spontaneous/Phasic PTV                     Yes      None  [id number] Dayday De La Rosa DO Electronically signed by [id number] Dayday De La Rosa DO on 4/4/2024 at 4:20:01 PM  ** Final **     MR foot right wo IV contrast    Result Date: 4/4/2024  Interpreted By:  Imelda David, STUDY: MRI of the right lower extremity without IV contrast;   INDICATION: Signs/Symptoms:concern for OM of R foot; Signs/Symptoms:r/out osteomyelitis   COMPARISON: Right foot radiographs dated 04/03/2024   ACCESSION NUMBER(S): [id number]; [id number]   ORDERING CLINICIAN: HAKAN BURNETTE   TECHNIQUE: Multiplanar multisequence MRI of the  right lower extremity was performed  without IV contrast.   FINDINGS: Soft tissues: There is a plantar soft tissue ulceration at the level of heel measuring a proximally 3 x 2 cm in AP and transverse dimensions respectively. There is diffuse subcutaneous soft tissue edema throughout the plantar soft tissues of the foot extending distally to the level of forefoot. This is suggestive of cellulitis. There is also diffuse circumferential subcutaneous soft tissue edema extending throughout the included portion of distal leg above the level of ankle joint and extends distally along the dorsal lateral aspect of the foot. This is a nonspecific finding and could represent cellulitis versus venous stasis versus lymphedema. Evaluation for abscess is limited due to noncontrast technique. Within this limitation, there is no confluence fluid collection subjacent to the site of ulceration to definitively suggest an abscess. There is effacement of normal fat signal in the subcutaneous soft tissues at the level of ulceration extending deep to the level of the plantar fascia/posterior calcaneal tuberosity.   There is small volume fluid within the peroneal compartment tendon sheath suggestive of mild tenosynovitis. The evaluation of tendon morphology is limited due to mild motion artifacts on axial sequences. The  flexor and extensor compartment tendons appear grossly unremarkable. There is mild patchy T2 hyperintense signal in the plantar foot musculature with mild fatty atrophy on T1 weighted sequences. This is most likely favored to represent diabetic ischemic myopathy. No evidence of intramuscular fluid collections within limits of noncontrast technique. There is small plantar calcaneal enthesophyte with mild thickening of the central band of plantar fascia suggestive of chronic plantar fasciitis. No abnormal signal is noted in the plantar fascia within limits of evaluation.   Bones:  There is effacement of normal fat signal in the plantar soft tissues at the level of posterior calcaneal tuberosity adjacent to the site of ulceration (as seen on sagittal image 13/32). There is very faint bone marrow edema along the plantar cortex of posterior calcaneal tuberosity subjacent to the level of ulceration. However no evidence of confluence loss of normal fat signal to suggest osteomyelitis. There are mild degenerative changes at the midfoot articulations, more pronounced at 1st through 5th tarsometatarsal joints with mild subchondral cystic changes. Tibiotalar joint articulation is maintained and demonstrates mild-to-moderate degenerative changes with trace tibiotalar joint effusion. Otherwise rest of the osseous structures appear grossly unremarkable.       1. Plantar heel soft tissue ulceration with subjacent cellulitis and mild reactive osteitis along the plantar cortex of posterior calcaneal tuberosity as described above. No definite MRI evidence of osteomyelitis at this point in time. No evidence of abscess within limits of noncontrast technique. 2. Mild tenosynovitis of the peroneal compartment tendons in the retro malleolar groove. This is most likely favored to be reactive, however infectious tenosynovitis can not be completely excluded. 3. Extensive circumferential subcutaneous soft tissue edema in the visualized distal  leg extending along the dorsal lateral aspect of the foot is nonspecific finding. This could be related to cellulitis versus venous stasis versus lymphedema. Recommend clinical correlation. 4. Signal abnormality in the plantar foot musculature is most likely favored to be related to chronic diabetic ischemic myopathy. Infectious myositis can also be considered in the differential.   MACRO: None   Signed by: Imelda David 4/4/2024 1:12 PM Dictation workstation:   [id number]    MR ankle right wo IV contrast    Result Date: 4/4/2024  Interpreted By:  Imelda David, STUDY: MRI of the right lower extremity without IV contrast;   INDICATION: Signs/Symptoms:concern for OM of R foot; Signs/Symptoms:r/out osteomyelitis   COMPARISON: Right foot radiographs dated 04/03/2024   ACCESSION NUMBER(S): [id number]; [id number]   ORDERING CLINICIAN: HAKAN BURNETTE   TECHNIQUE: Multiplanar multisequence MRI of the  right lower extremity was performed  without IV contrast.   FINDINGS: Soft tissues: There is a plantar soft tissue ulceration at the level of heel measuring a proximally 3 x 2 cm in AP and transverse dimensions respectively. There is diffuse subcutaneous soft tissue edema throughout the plantar soft tissues of the foot extending distally to the level of forefoot. This is suggestive of cellulitis. There is also diffuse circumferential subcutaneous soft tissue edema extending throughout the included portion of distal leg above the level of ankle joint and extends distally along the dorsal lateral aspect of the foot. This is a nonspecific finding and could represent cellulitis versus venous stasis versus lymphedema. Evaluation for abscess is limited due to noncontrast technique. Within this limitation, there is no confluence fluid collection subjacent to the site of ulceration to definitively suggest an abscess. There is effacement of normal fat signal in the subcutaneous soft tissues at the level of  ulceration extending deep to the level of the plantar fascia/posterior calcaneal tuberosity.   There is small volume fluid within the peroneal compartment tendon sheath suggestive of mild tenosynovitis. The evaluation of tendon morphology is limited due to mild motion artifacts on axial sequences. The flexor and extensor compartment tendons appear grossly unremarkable. There is mild patchy T2 hyperintense signal in the plantar foot musculature with mild fatty atrophy on T1 weighted sequences. This is most likely favored to represent diabetic ischemic myopathy. No evidence of intramuscular fluid collections within limits of noncontrast technique. There is small plantar calcaneal enthesophyte with mild thickening of the central band of plantar fascia suggestive of chronic plantar fasciitis. No abnormal signal is noted in the plantar fascia within limits of evaluation.   Bones:  There is effacement of normal fat signal in the plantar soft tissues at the level of posterior calcaneal tuberosity adjacent to the site of ulceration (as seen on sagittal image 13/32). There is very faint bone marrow edema along the plantar cortex of posterior calcaneal tuberosity subjacent to the level of ulceration. However no evidence of confluence loss of normal fat signal to suggest osteomyelitis. There are mild degenerative changes at the midfoot articulations, more pronounced at 1st through 5th tarsometatarsal joints with mild subchondral cystic changes. Tibiotalar joint articulation is maintained and demonstrates mild-to-moderate degenerative changes with trace tibiotalar joint effusion. Otherwise rest of the osseous structures appear grossly unremarkable.       1. Plantar heel soft tissue ulceration with subjacent cellulitis and mild reactive osteitis along the plantar cortex of posterior calcaneal tuberosity as described above. No definite MRI evidence of osteomyelitis at this point in time. No evidence of abscess within limits of  noncontrast technique. 2. Mild tenosynovitis of the peroneal compartment tendons in the retro malleolar groove. This is most likely favored to be reactive, however infectious tenosynovitis can not be completely excluded. 3. Extensive circumferential subcutaneous soft tissue edema in the visualized distal leg extending along the dorsal lateral aspect of the foot is nonspecific finding. This could be related to cellulitis versus venous stasis versus lymphedema. Recommend clinical correlation. 4. Signal abnormality in the plantar foot musculature is most likely favored to be related to chronic diabetic ischemic myopathy. Infectious myositis can also be considered in the differential.   MACRO: None   Signed by: Imelda Davdi 4/4/2024 1:12 PM Dictation workstation:   [id number]    XR foot right 3+ views    Result Date: 4/3/2024  Interpreted By:  Chris Martinez, STUDY: XR FOOT RIGHT 3+ VIEWS; ;  4/3/2024 5:31 pm   INDICATION: Signs/Symptoms:Right foot wound with right heel failure of outpatient antibiotics.   COMPARISON: 02/14/2024   ACCESSION NUMBER(S): [id number]   ORDERING CLINICIAN: JOSE CARLOS BURRELL   FINDINGS: Right foot, three views   Skin and soft tissue defect at the plantar aspect of the calcaneus. There is no osseous destruction or erosion radiographically. There is diffuse severe osteopenia however. Degenerative changes in the hindfoot.       No radiographic evidence of osteomyelitis. Skin and soft tissue defect underlying the calcaneus. If there is persistent concern MRI can be performed for further evaluation     MACRO: None   Signed by: Chris Martinez 4/3/2024 5:33 PM Dictation workstation:   [id number]   Results for orders placed or performed during the hospital encounter of 04/03/24 (from the past 24 hour(s))   Renal Function Panel   Result Value Ref Range    Glucose 80 74 - 99 mg/dL    Sodium 136 136 - 145 mmol/L    Potassium 3.8 3.5 - 5.3 mmol/L    Chloride 103 98 - 107 mmol/L    Bicarbonate 27  21 - 32 mmol/L    Anion Gap 10 10 - 20 mmol/L    Urea Nitrogen 32 (H) 6 - 23 mg/dL    Creatinine 3.31 (H) 0.50 - 1.05 mg/dL    eGFR 14 (L) >60 mL/min/1.73m*2    Calcium 9.3 8.6 - 10.3 mg/dL    Phosphorus 3.1 2.5 - 4.9 mg/dL    Albumin 2.6 (L) 3.4 - 5.0 g/dL   Magnesium   Result Value Ref Range    Magnesium 1.82 1.60 - 2.40 mg/dL   CBC   Result Value Ref Range    WBC 6.8 4.4 - 11.3 x10*3/uL    nRBC 0.0 0.0 - 0.0 /100 WBCs    RBC 3.19 (L) 4.00 - 5.20 x10*6/uL    Hemoglobin 9.7 (L) 12.0 - 16.0 g/dL    Hematocrit 30.3 (L) 36.0 - 46.0 %    MCV 95 80 - 100 fL    MCH 30.4 26.0 - 34.0 pg    MCHC 32.0 32.0 - 36.0 g/dL    RDW 13.1 11.5 - 14.5 %    Platelets 215 150 - 450 x10*3/uL          Assessment/Plan   Principal Problem:    Failure of outpatient treatment    Right plantar heel ulceration  Slight improvement in ulceration today.  Full thickness sharp excisional debridement with forceps and scissors.  Applied santyl aquacel, ABD and ace bandage.  Daily dressing changes.  Keep heel offloaded.   No gross signs of infection today  Antibiotics being held due to kidney function.  Will follow.  OK to WB on right foot with fracture boot.           Vanessa Oliva DPM

## 2024-04-11 NOTE — PROGRESS NOTES
JAKI - prerenal vs abx related improving  Hypertensive CKD  CKD 4  Cellulitis of pressure ulcer status postdebridement debridement off abx - no ongoing concerns for infection as d/w podiatry    - Okay for discharge  - Send home on prn Lasix for weight gain > 2lbs      No new issues.  Off antibiotics.  Podiatry was at bedside debriding and indicated to me that there are no signs of infection.  Creatinine improved

## 2024-04-11 NOTE — CARE PLAN
The patient's goals for the shift include  safety, comfort    The clinical goals for the shift include safety

## 2024-04-11 NOTE — NURSING NOTE
Assumed care of patient.     [facility].  Patients BP low this AM. Upon recheck returned to normal.  Patient still getting dizzy when getting up to BSC.   Patient's precert accepted to [facility], will discharge at 10am tomorrow morning.  is aware.   Dressing changed by Dr. KATIA PETERS, JIMENEZN

## 2024-04-11 NOTE — DISCHARGE INSTRUCTIONS
You were treated for an infection in the skin of your foot. It was treated with surgery and antibiotics. While in the hospital, you had a temporary decrease in your kidney function. We attribute this to low blood pressure and some of the medications you were taking. Your kidney function started to improve after we stopped those medicines. In the future, we think you should make an appointment to see your primary care physician, your podiatrist Dr Oliva, and your cardiologist. Ideally these appointments should be done within 2 weeks.

## 2024-04-11 NOTE — PROGRESS NOTES
Magi Boyd is a 79 y.o. female on day 8 of admission presenting with Failure of outpatient treatment.    Subjective   Interval History: no fever, no new complaints        Review of Systems    Objective   Range of Vitals (last 24 hours)  Heart Rate:  [60-65]   Temp:  [36.2 °C (97.2 °F)-36.5 °C (97.7 °F)]   Resp:  [17-20]   BP: (100-121)/(66-77)   SpO2:  [94 %-96 %]   Daily Weight  04/03/24 : 104 kg (229 lb)    Body mass index is 40.57 kg/m².    Physical Exam  Constitutional:       Appearance: Normal appearance.   HENT:      Head: Normocephalic and atraumatic.      Mouth/Throat:      Mouth: Mucous membranes are moist.      Pharynx: Oropharynx is clear.   Eyes:      Pupils: Pupils are equal, round, and reactive to light.   Cardiovascular:      Rate and Rhythm: Normal rate and regular rhythm.      Heart sounds: Normal heart sounds.   Pulmonary:      Effort: Pulmonary effort is normal.      Breath sounds: Normal breath sounds.   Abdominal:      General: Abdomen is flat. Bowel sounds are normal.      Palpations: Abdomen is soft.   Musculoskeletal:      Cervical back: Normal range of motion.      Comments: Stasis  Rt heel wound   Neurological:      Mental Status: She is alert.         Antibiotics  cefepime (Maxipime) IV 2 g  vancomycin (Vancocin) 2000 mg/500 ml in D5W 500 mL IV piggyback 2,000 mg  metroNIDAZOLE (Flagyl) 500 mg in NaCl (iso-os) 100 mL  heparin (porcine) injection 7,500 Units  polyethylene glycol (Glycolax, Miralax) packet 17 g  atenolol (Tenormin) tablet 100 mg  atorvastatin (Lipitor) tablet 20 mg  fluticasone furoate-vilanteroL (Breo Ellipta) 100-25 mcg/dose inhaler 1 puff  famotidine-Ca carb-mag hydrox (Pepcid Complete) -165 mg chewable tablet 1 tablet  febuxostat (Uloric) tablet 40 mg  ferrous gluconate (Fergon) 324 (38 Fe) mg tablet 324 mg  furosemide (Lasix) tablet 40 mg  levothyroxine (Synthroid, Levoxyl) tablet 75 mcg  oxybutynin (Ditropan) tablet 5 mg  solifenacin (VESIcare) tablet 5  mg  traMADol (Ultram) tablet  famotidine (Pepcid) tablet 10 mg  alum-mag hydroxide-simeth (Mylanta) 200-200-20 mg/5 mL oral suspension 30 mL  cefepime (Maxipime) 1 g in dextrose 5% 50 mL IV  vancomycin (Vancocin) pharmacy to dose - pharmacy monitoring  acyclovir (Zovirax) capsule 400 mg  traMADol (Ultram) tablet 50 mg  ceFAZolin in dextrose (iso-os) (Ancef) IVPB 1 g      Relevant Results  Labs  Results from last 72 hours   Lab Units 04/11/24  0647 04/10/24  0650 04/09/24  0718   WBC AUTO x10*3/uL 6.8 6.7 6.0   HEMOGLOBIN g/dL 9.7* 10.0* 10.1*   HEMATOCRIT % 30.3* 31.5* 32.6*   PLATELETS AUTO x10*3/uL 215 239 225     Results from last 72 hours   Lab Units 04/11/24  0647 04/10/24  0650 04/09/24  0718   SODIUM mmol/L 136 135* 137   POTASSIUM mmol/L 3.8 3.8 3.6   CHLORIDE mmol/L 103 101 102   CO2 mmol/L 27 28 27   BUN mg/dL 32* 31* 30*   CREATININE mg/dL 3.31* 3.48* 3.25*   GLUCOSE mg/dL 80 81 84   CALCIUM mg/dL 9.3 9.3 9.2   ANION GAP mmol/L 10 10 12   EGFR mL/min/1.73m*2 14* 13* 14*   PHOSPHORUS mg/dL 3.1 3.0 3.2     Results from last 72 hours   Lab Units 04/11/24  0647 04/10/24  0650 04/09/24  0718   ALBUMIN g/dL 2.6* 2.7* 2.6*     Estimated Creatinine Clearance: 16.4 mL/min (A) (by C-G formula based on SCr of 3.31 mg/dL (H)).  C-Reactive Protein   Date Value Ref Range Status   04/03/2024 2.55 (H) <1.00 mg/dL Final     Microbiology  Reviewed  Imaging  Reviewed        Assessment/Plan   Right heel none healing wound / possible infection, the MRI was reviewed, no bony erosions sp bedside debridement, the culture with rare mixed skin monisha  Healing Lt thoracic zoster     Recommendations :  The antibiotics were stopped because of a concern about interstitial nephritis  related to the antibiotics  Discussed with the wound care team     I spent minutes in the professional and overall care of this patient.      Christopher Sharp MD

## 2024-04-11 NOTE — PROGRESS NOTES
Occupational Therapy    Occupational Therapy Treatment    Name: Magi Boyd  MRN: [id number]  : 1945  Date: 24  Time Calculation  Start Time: 1110  Stop Time: 1138  Time Calculation (min): 28 min    Assessment:  OT Assessment: Pt is making progress toward goals with skilled OT interventions. Pt to continue to benefit from skilled OT services to increase independence with ADL's, transfers, and mobility  Prognosis: Good  Barriers to Discharge: None  Evaluation/Treatment Tolerance: Patient limited by fatigue, Other (Comment) (Onset of dizziness in standing.)  Medical Staff Made Aware: Yes  End of Session Communication: Bedside nurse  End of Session Patient Position: Bed, 3 rail up, Alarm on (Pt seated on EOB for lunch)  Plan:  Treatment Interventions: ADL retraining, Functional transfer training, UE strengthening/ROM, Endurance training  OT Frequency: 3 times per week  OT Discharge Recommendations: Moderate intensity level of continued care  Equipment Recommended upon Discharge: Wheeled walker  OT Recommended Transfer Status: Assist of 1  OT - OK to Discharge: Yes (In accord with OT POC)    Subjective   Previous Visit Info:  OT Last Visit  OT Received On: 24  General:  General  Reason for Referral: Pt is a 79 year old female referred to PT for R foot sx, impaired mobility, impaired ADL  Referred By: Mary Tucker MD  Past Medical History Relevant to Rehab: Acute UTI (2023), Adverse reaction to NSAIDs, sequela (2023), Benign essential hypertension (2023), Bilateral leg and foot pain (2023), Body mass index (BMI)40.0-44.9, adult (10/18/2021), CKD (chronic kidney disease) (2023), Disorder of both eustachian tubes (2023), Familial hypercholesteremia (2023), GERD (gastroesophageal reflux disease) (2023), Lower urinary tract symptoms (2023), Obesity, unspecified (2022), Otalgia, left ear (2020), Other abnormal glucose  (09/29/2016), Other specified abnormal findings of blood chemistry (10/05/2016), Other specified disorders of ear, unspecified ear (03/18/2015), Pelvic and perineal pain (10/24/2019), Personal history of other diseases of the respiratory system (12/05/2013), Personal history of other diseases of the respiratory system (03/18/2015), Personal history of other diseases of the respiratory system (12/17/2019), Personal history of other drug therapy (10/25/2019), Personal history of other endocrine, nutritional and metabolic disease, Personal history of other specified conditions (09/26/2013), Personal history of other specified conditions (10/29/2013), Personal history of other specified conditions (02/18/2020), Personal history of other specified conditions (02/18/2020), Personal history of other specified conditions (02/09/2015), Personal history of urinary (tract) infections (12/18/2018), Persons encountering health services in other specified circumstances (10/25/2019), Right knee pain (04/20/2023), Simple chronic bronchitis (CMS/HCC) (08/23/2023), SOB (shortness of breath) (08/23/2023), Toe pain (08/23/2023), Unspecified abnormal findings in urine (08/25/2021), Urinary tract infection, site not specified (10/18/2021), and Urinary tract infection, site not specified (08/30/2022).  Family/Caregiver Present: No  Prior to Session Communication: Bedside nurse (Nurse requested limit to bed level activities d/t low BP earlier. Requested orthostatic BP's if possible.)  Patient Position Received: Bed, 3 rail up, Alarm on  Preferred Learning Style: verbal  General Comment: Pt pleasnt and cooperative. Supine and seated BP's obtained 130/70 pulse 68, 142/85 sitting EOB pulse 61, unable to obtain standing BP with masimo timing out and pt m=needing to sit d/t dizziness. Nurse provided with measurements.  Precautions:  Medical Precautions: Fall precautions (IV, purewick)  Precautions Comment: R LE WBAT in CAM boot.  Vitals:      Pain Assessment:  Pain Assessment  Pain Assessment: 0-10  Pain Score: 0 - No pain     Objective   Activities of Daily Living: LE Dressing  LE Dressing: Yes  Shoe Level of Assistance: Maximum assistance, Moderate verbal cues (Left shoe min assist following setup at EOB, CAM boot max assist to properly position and close straps in sitting at EOB.)  LE Dressing Where Assessed: Edge of bed  LE Dressing Comments: Significantly increased time on task required following set-up.     Bed Mobility/Transfers: Bed Mobility  Bed Mobility: Yes  Bed Mobility 1  Bed Mobility 1: Supine to sitting, Scooting  Level of Assistance 1: Minimum assistance, Minimal verbal cues  Bed Mobility Comments 1: Max verbal cues for hand position    Transfers  Transfer: Yes  Transfer 1  Transfer From 1: Bed to  Transfer to 1: Stand  Technique 1: Sit to stand, Stand to sit  Transfer Device 1: Walker  Transfer Level of Assistance 1: Minimum assistance, Minimal verbal cues  Trials/Comments 1: Min cues for safe hand position in transitions provided. Transfers not attemted d/t need to sit d/t dizziness and inability to obtain standing orthostatic BP.    Outcome Measures:  [facility] Daily Activity  Putting on and taking off regular lower body clothing: A lot  Bathing (including washing, rinsing, drying): A lot  Putting on and taking off regular upper body clothing: A little  Toileting, which includes using toilet, bedpan or urinal: A lot  Taking care of personal grooming such as brushing teeth: A little  Eating Meals: None  Daily Activity - Total Score: 16    Education Documentation  Body Mechanics, taught by CAREN Arauz at 4/11/2024 12:30 PM.  Learner: Patient  Readiness: Acceptance  Method: Explanation, Demonstration  Response: Verbalizes Understanding, Demonstrated Understanding, Needs Reinforcement    Precautions, taught by CAREN Arauz at 4/11/2024 12:30 PM.  Learner: Patient  Readiness: Acceptance  Method: Explanation,  Demonstration  Response: Verbalizes Understanding, Demonstrated Understanding, Needs Reinforcement    ADL Training, taught by CAREN Arauz at 4/11/2024 12:30 PM.  Learner: Patient  Readiness: Acceptance  Method: Explanation, Demonstration  Response: Verbalizes Understanding, Demonstrated Understanding, Needs Reinforcement    Education Comments  Pt responsive and compliant to instruction provided in course of session.    Goals:  Encounter Problems       Encounter Problems (Active)       OT Goals       Pt will tolerate 10min stand during functional task completion with no more than 1 rest break in order to increase endurance for functional task completion.  (Progressing)       Start:  04/06/24    Expected End:  04/20/24            Pt will increase endurance to tolerate 15min of OOB activity with no more than 1 rest break in order to increase ability to engage in ADL completion.  (Progressing)       Start:  04/06/24    Expected End:  04/20/24            Pt will complete [id number] transfers using LRD in preparation for ADLs with supervision  (Progressing)       Start:  04/06/24    Expected End:  04/20/24            Pt will demo LE ADL completion with modified independence, using AE if needed.  (Progressing)       Start:  04/06/24    Expected End:  04/20/24            Pt will demo and/or verbalize 2-3 energy conservation techniques to incorporate into functional mobility or ADL to improve performance and increase independence.  (Progressing)       Start:  04/06/24    Expected End:  04/20/24

## 2024-04-12 ENCOUNTER — PHARMACY VISIT (OUTPATIENT)
Dept: PHARMACY | Facility: CLINIC | Age: 79
End: 2024-04-12
Payer: COMMERCIAL

## 2024-04-12 ENCOUNTER — APPOINTMENT (OUTPATIENT)
Dept: VASCULAR MEDICINE | Facility: HOSPITAL | Age: 79
DRG: 571 | End: 2024-04-12
Payer: MEDICARE

## 2024-04-12 LAB
ALBUMIN SERPL BCP-MCNC: 2.8 G/DL (ref 3.4–5)
ANION GAP SERPL CALC-SCNC: 9 MMOL/L (ref 10–20)
BUN SERPL-MCNC: 33 MG/DL (ref 6–23)
CALCIUM SERPL-MCNC: 9.4 MG/DL (ref 8.6–10.3)
CHLORIDE SERPL-SCNC: 103 MMOL/L (ref 98–107)
CO2 SERPL-SCNC: 27 MMOL/L (ref 21–32)
CREAT SERPL-MCNC: 3.34 MG/DL (ref 0.5–1.05)
EGFRCR SERPLBLD CKD-EPI 2021: 14 ML/MIN/1.73M*2
ERYTHROCYTE [DISTWIDTH] IN BLOOD BY AUTOMATED COUNT: 13.4 % (ref 11.5–14.5)
GLUCOSE SERPL-MCNC: 83 MG/DL (ref 74–99)
HCT VFR BLD AUTO: 32.1 % (ref 36–46)
HGB BLD-MCNC: 10.3 G/DL (ref 12–16)
MAGNESIUM SERPL-MCNC: 1.92 MG/DL (ref 1.6–2.4)
MCH RBC QN AUTO: 30.7 PG (ref 26–34)
MCHC RBC AUTO-ENTMCNC: 32.1 G/DL (ref 32–36)
MCV RBC AUTO: 96 FL (ref 80–100)
NRBC BLD-RTO: 0 /100 WBCS (ref 0–0)
PHOSPHATE SERPL-MCNC: 3.2 MG/DL (ref 2.5–4.9)
PLATELET # BLD AUTO: 219 X10*3/UL (ref 150–450)
POTASSIUM SERPL-SCNC: 3.9 MMOL/L (ref 3.5–5.3)
RBC # BLD AUTO: 3.36 X10*6/UL (ref 4–5.2)
SODIUM SERPL-SCNC: 135 MMOL/L (ref 136–145)
WBC # BLD AUTO: 5.9 X10*3/UL (ref 4.4–11.3)

## 2024-04-12 PROCEDURE — 83735 ASSAY OF MAGNESIUM: CPT

## 2024-04-12 PROCEDURE — 93975 VASCULAR STUDY: CPT | Performed by: INTERNAL MEDICINE

## 2024-04-12 PROCEDURE — 97530 THERAPEUTIC ACTIVITIES: CPT | Mod: GP,CQ

## 2024-04-12 PROCEDURE — 36415 COLL VENOUS BLD VENIPUNCTURE: CPT

## 2024-04-12 PROCEDURE — 97116 GAIT TRAINING THERAPY: CPT | Mod: GP,CQ

## 2024-04-12 PROCEDURE — 2500000001 HC RX 250 WO HCPCS SELF ADMINISTERED DRUGS (ALT 637 FOR MEDICARE OP): Performed by: STUDENT IN AN ORGANIZED HEALTH CARE EDUCATION/TRAINING PROGRAM

## 2024-04-12 PROCEDURE — RXMED WILLOW AMBULATORY MEDICATION CHARGE

## 2024-04-12 PROCEDURE — 93880 EXTRACRANIAL BILAT STUDY: CPT

## 2024-04-12 PROCEDURE — 2500000001 HC RX 250 WO HCPCS SELF ADMINISTERED DRUGS (ALT 637 FOR MEDICARE OP)

## 2024-04-12 PROCEDURE — 1100000001 HC PRIVATE ROOM DAILY

## 2024-04-12 PROCEDURE — 85027 COMPLETE CBC AUTOMATED: CPT

## 2024-04-12 PROCEDURE — 93880 EXTRACRANIAL BILAT STUDY: CPT | Performed by: INTERNAL MEDICINE

## 2024-04-12 PROCEDURE — 99232 SBSQ HOSP IP/OBS MODERATE 35: CPT | Performed by: STUDENT IN AN ORGANIZED HEALTH CARE EDUCATION/TRAINING PROGRAM

## 2024-04-12 PROCEDURE — 2500000004 HC RX 250 GENERAL PHARMACY W/ HCPCS (ALT 636 FOR OP/ED)

## 2024-04-12 PROCEDURE — 80069 RENAL FUNCTION PANEL: CPT

## 2024-04-12 PROCEDURE — 93975 VASCULAR STUDY: CPT

## 2024-04-12 RX ORDER — ATENOLOL 25 MG/1
25 TABLET ORAL DAILY
Qty: 30 TABLET | Refills: 0 | Status: SHIPPED | OUTPATIENT
Start: 2024-04-12 | End: 2024-05-21 | Stop reason: SDUPTHER

## 2024-04-12 RX ADMIN — FLUTICASONE FUROATE AND VILANTEROL TRIFENATATE 1 PUFF: 100; 25 POWDER RESPIRATORY (INHALATION) at 08:49

## 2024-04-12 RX ADMIN — FAMOTIDINE 10 MG: 20 TABLET ORAL at 08:48

## 2024-04-12 RX ADMIN — LEVOTHYROXINE SODIUM 75 MCG: 75 TABLET ORAL at 05:26

## 2024-04-12 RX ADMIN — ATORVASTATIN CALCIUM 20 MG: 20 TABLET, FILM COATED ORAL at 21:19

## 2024-04-12 RX ADMIN — FERROUS GLUCONATE 324 MG: 324 TABLET ORAL at 21:18

## 2024-04-12 RX ADMIN — Medication 1 APPLICATION: at 21:20

## 2024-04-12 RX ADMIN — HEPARIN SODIUM 7500 UNITS: 5000 INJECTION INTRAVENOUS; SUBCUTANEOUS at 14:09

## 2024-04-12 RX ADMIN — FEBUXOSTAT 40 MG: 40 TABLET ORAL at 08:50

## 2024-04-12 RX ADMIN — HEPARIN SODIUM 7500 UNITS: 5000 INJECTION INTRAVENOUS; SUBCUTANEOUS at 05:26

## 2024-04-12 RX ADMIN — HEPARIN SODIUM 7500 UNITS: 5000 INJECTION INTRAVENOUS; SUBCUTANEOUS at 21:18

## 2024-04-12 RX ADMIN — FERROUS GLUCONATE 324 MG: 324 TABLET ORAL at 08:48

## 2024-04-12 RX ADMIN — COLLAGENASE SANTYL: 250 OINTMENT TOPICAL at 08:49

## 2024-04-12 RX ADMIN — Medication 1 APPLICATION: at 08:49

## 2024-04-12 ASSESSMENT — COGNITIVE AND FUNCTIONAL STATUS - GENERAL
TOILETING: A LOT
MOBILITY SCORE: 13
DRESSING REGULAR LOWER BODY CLOTHING: A LOT
MOVING TO AND FROM BED TO CHAIR: A LOT
PERSONAL GROOMING: A LITTLE
DRESSING REGULAR UPPER BODY CLOTHING: A LITTLE
CLIMB 3 TO 5 STEPS WITH RAILING: A LOT
STANDING UP FROM CHAIR USING ARMS: A LOT
DRESSING REGULAR LOWER BODY CLOTHING: A LOT
CLIMB 3 TO 5 STEPS WITH RAILING: A LOT
DAILY ACTIVITIY SCORE: 16
TURNING FROM BACK TO SIDE WHILE IN FLAT BAD: A LITTLE
TURNING FROM BACK TO SIDE WHILE IN FLAT BAD: A LITTLE
MOBILITY SCORE: 13
WALKING IN HOSPITAL ROOM: A LOT
WALKING IN HOSPITAL ROOM: A LOT
TURNING FROM BACK TO SIDE WHILE IN FLAT BAD: A LITTLE
HELP NEEDED FOR BATHING: A LOT
HELP NEEDED FOR BATHING: A LOT
PERSONAL GROOMING: A LITTLE
MOVING FROM LYING ON BACK TO SITTING ON SIDE OF FLAT BED WITH BEDRAILS: A LOT
MOVING TO AND FROM BED TO CHAIR: A LOT
WALKING IN HOSPITAL ROOM: A LOT
STANDING UP FROM CHAIR USING ARMS: A LOT
MOBILITY SCORE: 13
MOVING FROM LYING ON BACK TO SITTING ON SIDE OF FLAT BED WITH BEDRAILS: A LOT
TOILETING: A LOT
DAILY ACTIVITIY SCORE: 16
MOVING FROM LYING ON BACK TO SITTING ON SIDE OF FLAT BED WITH BEDRAILS: A LOT
STANDING UP FROM CHAIR USING ARMS: A LOT
DRESSING REGULAR UPPER BODY CLOTHING: A LITTLE
CLIMB 3 TO 5 STEPS WITH RAILING: A LOT
MOVING TO AND FROM BED TO CHAIR: A LOT

## 2024-04-12 ASSESSMENT — PAIN SCALES - GENERAL
PAINLEVEL_OUTOF10: 0 - NO PAIN

## 2024-04-12 ASSESSMENT — PAIN - FUNCTIONAL ASSESSMENT: PAIN_FUNCTIONAL_ASSESSMENT: 0-10

## 2024-04-12 NOTE — CONSULTS
"  Wound Care Consult     Visit Date: 4/12/2024      Patient Name: Magi Boyd         MRN: [id number]           YOB: 1945     Reason for Consult: consult for MASD        Wound History: Admitted on 4/3/24, wound consult 4/11/24.  Patient admitted with worsening heel wounds seen 4/4 by Podiatry. Patient noted to have MASD 4/10.     Pertinent Labs:   Albumin   Date Value Ref Range Status   04/12/2024 2.8 (L) 3.4 - 5.0 g/dL Final     ALBUMIN (MG/L) IN URINE   Date Value Ref Range Status   07/13/2023 <7.0 Not Established mg/L Final     Albumin, Urine Random   Date Value Ref Range Status   04/09/2024 52.3 Not established mg/L Final       Wound Assessment:  Wound 04/03/24 Heel Right (Active)       Wound 04/10/24 Groin (Active)   Wound Image   04/10/24 1621       Wound 04/10/24 Leg Dorsal;Proximal;Right;Upper (Active)   Wound Image   04/10/24 1623       Wound Team Summary Assessment: Dark red, moist madeline area, pure wick in use.  Linear full thickness wound to left upper thigh/ groin  0.2 x 6 cm.  Patient states \"it feels better since zinc has been applied\".    Sponge bath provided, true samira boots for comfort, Brunilda OSWALD provided shower cap for patient- head was itching, set up for supper.     Wound Team Plan: Ensure correct placement of purewick, if not accurate placement, place premier pro pad as brief with slight puling up between legs to ensure not soaking bed when incontinent.  Discussed bathing, pat skin dry, allowing to heal to open air for a short time daily as possible, then applying zinc for protection.  If irritation to wound starts will need to cover with mepilex, at this time, zinc and CYRIL feel comfortable to patient.  Education for MASD provided.       Rhona Bobby RN  4/12/2024  4:55 PM        "

## 2024-04-12 NOTE — PROGRESS NOTES
Subjective    Magi Boyd is a 79 y.o. female presenting for infected pressure ulcer. Pt doing well, no pain, difficult to ambulate d/t lightheadedness, which has improved compared to yesterday.    Objective    Physical Exam  Constitutional:       General: She is not in acute distress.     Appearance: Normal appearance. She is obese. She is not ill-appearing.   HENT:      Head: Normocephalic and atraumatic.      Nose: Nose normal.      Mouth/Throat:      Mouth: Mucous membranes are moist.      Pharynx: Oropharynx is clear.   Cardiovascular:      Rate and Rhythm: Normal rate and regular rhythm.      Heart sounds: Normal heart sounds. No murmur heard.     No friction rub. No gallop.   Pulmonary:      Effort: Pulmonary effort is normal. No respiratory distress.      Breath sounds: Normal breath sounds. No wheezing, rhonchi or rales.   Abdominal:      General: Abdomen is flat. Bowel sounds are normal. There is no distension.      Palpations: Abdomen is soft.      Tenderness: There is no abdominal tenderness. There is no guarding or rebound.   Musculoskeletal:         General: Swelling and signs of injury present.      Comments: RLE in boot, dressed and wrapped.   Skin:     General: Skin is warm and dry.      Capillary Refill: Capillary refill takes less than 2 seconds.   Neurological:      General: No focal deficit present.      Mental Status: She is alert and oriented to person, place, and time.     Temp:  [35.9 °C (96.6 °F)-36.8 °C (98.2 °F)] 35.9 °C (96.6 °F)  Heart Rate:  [58-79] 79  Resp:  [18-19] 18  BP: (111-155)/(64-99) 150/99    Vitals:    04/03/24 1610   Weight: 104 kg (229 lb)     I/Os    Intake/Output Summary (Last 24 hours) at 4/12/2024 1633  Last data filed at 4/12/2024 0426  Gross per 24 hour   Intake 360 ml   Output 925 ml   Net -565 ml     Labs:   Results from last 72 hours   Lab Units 04/12/24  0756 04/11/24  0647 04/10/24  0650   SODIUM mmol/L 135* 136 135*   POTASSIUM mmol/L 3.9 3.8 3.8    CHLORIDE mmol/L 103 103 101   CO2 mmol/L 27 27 28   BUN mg/dL 33* 32* 31*   CREATININE mg/dL 3.34* 3.31* 3.48*   GLUCOSE mg/dL 83 80 81   CALCIUM mg/dL 9.4 9.3 9.3   ANION GAP mmol/L 9* 10 10   EGFR mL/min/1.73m*2 14* 14* 13*   PHOSPHORUS mg/dL 3.2 3.1 3.0      Results from last 72 hours   Lab Units 04/12/24  0756 04/11/24  0647 04/10/24  0650   WBC AUTO x10*3/uL 5.9 6.8 6.7   HEMOGLOBIN g/dL 10.3* 9.7* 10.0*   HEMATOCRIT % 32.1* 30.3* 31.5*   PLATELETS AUTO x10*3/uL 219 215 239      Lab Results   Component Value Date    CALCIUM 9.4 04/12/2024    PHOS 3.2 04/12/2024      Lab Results   Component Value Date    CRP 2.55 (H) 04/03/2024      Micro/ID:   Susceptibility data from last 90 days.  Collected Specimen Info Organism Amoxicillin/Clavulanate Ampicillin Ampicillin/Sulbactam Cefazolin Cefepime Ciprofloxacin Gentamicin Nitrofurantoin Piperacillin/Tazobactam Trimethoprim/Sulfamethoxazole   04/06/24 Tissue/Biopsy from Wound/Tissue Mixed Skin Microorganisms              03/14/24 Urine from Clean Catch/Voided Enterobacter cloacae complex R R R R S S S S S R     Lab Results   Component Value Date    URINECULTURE >100,000 Enterobacter cloacae complex (A) 03/14/2024     Images:  Renal artery duplex complete  Preliminary Cardiology Report            [place] [address]   Tel [phone] and Fax [phone]         Preliminary Vascular Lab Report     [facility] RENAL ARTERY DUPLEX COMPLETE       Patient Name:      CHANDNI Khalil Physician: [id number] Mounika Acevedo MD  Study Date:        4/12/2024          Ordering           [id number] TARIQ VEGA                                        Physician:         SANTIAGO  MRN/PID:           [id number]           Technologist:      Naomy Gauthier S  Accession#:        [id number]       Technologist 2:  Date of Birth/Age: 1945          Encounter#:        [id number]  Gender:            F  Admission Status:  Inpatient          Location            [facility]                                        Performed:       Diagnosis/ICD: Essential primary hypertension-I10  Procedure/CPT: 78671 Abdominal Visceral Renal       PRELIMINARY CONCLUSIONS:  Right Renal Artery: Right renal arteries demonstrate no evidence of hemodynamically significant stenosis. The right renal vein is widely patent.  Left Renal Artery: Left renal arteries demonstrate no evidence of hemodynamically significant stenosis. The left renal vein is widely patent.     Additional Findings:  Technically difficult exam is noted due to patient inability to position,  overlying bowel gas, and patient body habitus.  Patient was not NPO for appropriate time and exam performed in afternoon per  ordering physician wanting exam done prior to discharge.       Imaging & Doppler Findings:     Renal Artery Duplex Right Kidney: 10.3 cm                           Left Kidney: 9.9 cm        Systolic        Diastolic     ARTERY           Systolic       Diastolic         37 cm/s         9 cm/s       Origin            79 cm/s        9 cm/s         77 cm/s         7 cm/s        Prox             68 cm/s        11 cm/s         59 cm/s         9 cm/s         Mid             49 cm/s        13 cm/s         41 cm/s         10 cm/s      Distal            75 cm/s        14 cm/s         25 cm/s         3 cm/s      Superior           22 cm/s        6 cm/s         21 cm/s         4 cm/s      Inferior           18 cm/s        2 cm/s                          Right                          Left                           1.1       R/A Ratio            1.1                           0.8    Resistive Index         0.7       Ao Dist Diam 2.20 cm  Mid Ao       71 cm/s            VASCULAR PRELIMINARY REPORT  completed by Naomy NAGEL on 4/12/2024 at 2:07:40 PM       ** Final **  Vascular US carotid artery duplex bilateral            [place] [address]   Tel [phone] and  Fax [phone]       Vascular Lab Report  [facility] US CAROTID ARTERY DUPLEX BILATERAL       Patient Name:      CHANDNI RODRIGUEZ   Reading Physician:  [id number] Tomi Pitts MD, RPVI  Study Date:        4/12/2024            Ordering Physician: [id number] TARIQ HENDERSON  MRN/PID:           [id number]             Technologist:       Laila Aleman RVT  Accession#:        [id number]         Technologist 2:  Date of Birth/Age: 1945 / 79 years Encounter#:         [id number]  Gender:            F  Admission Status:  Inpatient            Location Performed: [facility]       Diagnosis/ICD: Syncope and collapse-R55  CPT Codes:     56682 Cerebrovascular Carotid Duplex scan complete       CONCLUSIONS:  Right Carotid: Findings are consistent with less than 50% stenosis of the right proximal internal carotid artery. Laminar flow seen by color Doppler. Right external carotid artery appears patent with no evidence of stenosis. The right vertebral artery is patent with antegrade flow. No evidence of hemodynamically significant stenosis in the right subclavian artery. Non-vascularized structure noted in right thyroid measuring 0.2 cm x 0.3 cm.  Left Carotid: Findings are consistent with less than 50% stenosis of the left proximal internal carotid artery. Left external carotid artery appears patent with no evidence of stenosis. The left vertebral artery is patent with antegrade flow. No evidence of hemodynamically significant stenosis in the left subclavian artery.     Imaging & Doppler Findings:  Right Plaque Morph: The proximal right internal carotid artery demonstrates heterogenous and calcified plaque. The proximal right external carotid artery demonstrates heterogenous plaque. The distal right common carotid artery demonstrates heterogenous plaque.  Left Plaque Morph: The proximal left internal  carotid artery demonstrates heterogenous and calcified plaque. The proximal left external carotid artery demonstrates heterogenous plaque. The distal left common carotid artery demonstrates heterogenous and calcified plaque.      Right                        Left    PSV      EDV                PSV      EDV  85 cm/s            CCA P    82 cm/s  92 cm/s            CCA D    67 cm/s  62 cm/s  17 cm/s   ICA P    79 cm/s  22 cm/s  79 cm/s            ICA M    57 cm/s  74 cm/s            ICA D    68 cm/s  70 cm/s             ECA     76 cm/s  35 cm/s          Vertebral  50 cm/s  145 cm/s         Subclavian 126 cm/s                  Right Left  ICA/CCA Ratio  0.7  1.2          [id number] Tomi Pitts MD, RPVI  Electronically signed by [id number] Tomi Pitts MD, RPVI on 4/12/2024 at 11:58:34 AM       ** Final **     Meds:  Scheduled medications  [Held by provider] atenolol, 100 mg, oral, Daily  atorvastatin, 20 mg, oral, Daily  collagenase, , Topical, Daily  famotidine, 10 mg, oral, Every other day  febuxostat, 40 mg, oral, Daily  ferrous gluconate, 324 mg, oral, BID  fluticasone furoate-vilanteroL, 1 puff, inhalation, Daily  [Held by provider] furosemide, 40 mg, oral, Daily  heparin (porcine), 7,500 Units, subcutaneous, q8h IMANI  levothyroxine, 75 mcg, oral, Daily  [Held by provider] oxybutynin, 5 mg, oral, Nightly  zinc oxide, 1 Application, Topical, BID      PRN medications  PRN medications: acetaminophen, alum-mag hydroxide-simeth, ondansetron, polyethylene glycol, [Held by provider] traMADol     Assessment    Magi Boyd is a 79 y.o. female presenting for malodorous drainage from foot ulcer, treated for cellulitis and pressure ulcer. Hx of gout, HTN, chronic venous insufficiency, CKD 4, hypothyroidism, overactive bladder, and asthma. Found to have varicella infection on admission. Developed JAKI and oliguria, which improved with cessation of diuresis and antibiotics.    Acute medical issues  # R heel pressure ulcer stage  II vs stage III s/p debridement on 4/6  # R heel cellulitis  # peripheral edema improving  - Weight bearing on R foot with fracture boot per podiatry  - Wound care per podiatry  - Pending SNF    # Azotemia  # Oliguria  :: CKD4 follows with Dr Narayan   :: Low PO intake vs overdiuresis vs obstructive  - Urine lytes, urine albumin, osm, serum osm WNL  - Hold PO furosemide 40mg  - Nephrology following, OK for DC with furosemide for weight gain >2kg  - Renal US shows cortical thickening without hydronephrosis    # Near syncope  :: Orthostatic 2/2 decreased atenolol clearance  - Orthostatic vital signs improved compared to yesterday  - Decrease atenolol to 25mg daily  - Near syncope improved  - Renal artery ultrasound  - Carotid artery ultrasound    Resolved medical issues  # VZV infection  - Present in single dermatome  - Completed acyclovir treatment    Chronic medical issues  # Gout  - Hold febuxostat 40mg    # Fe defficiency  - Ferrous gluconate 324mg    # Asthma  - Continue home inhalers (breo)    # HTN  - Restart atenolol at 25mg on discharge    # HLD  - Atorvastatin 20mg    # Overactive bladder  - Hold oxybutinin 5mg    # Hypothyroidism  - Levothyroxine 75ug    F PO  E Replete as needed  N Regular diet  G Famotidine  DVT: Sub Q heparin  Abx: Cefazolin  Drips: None  Code status: Full code  NoK:  Primary Emergency Contact: Ventura Boyd    Dispo: 79 F presenting with pressure ulcer and cellulitis s/p debridement 4/6. DC to SNF 4/13.    Christiano Roman MD

## 2024-04-12 NOTE — CARE PLAN
The patient's goals for the shift include pain control and comfort     The clinical goals for the shift include pt will remain safe and comfortable

## 2024-04-12 NOTE — PROGRESS NOTES
04/12/24 1400   Discharge Planning   Has discharge transport been arranged? Yes   What day is the transport expected? 04/13/24   What time is the transport expected? 1000     4/12/2024 1422: Transport to [facility] arrangements made for 4/13/2024 10am. Patient and spouse made aware.

## 2024-04-12 NOTE — PROGRESS NOTES
04/12/24 0834   Discharge Planning   Patient expects to be discharged to: Per physician, patient is having dizziness with change of position. Transport is cancelled for today to [facility]. May DC over the weekend.

## 2024-04-12 NOTE — PROGRESS NOTES
Magi Boyd is a 79 y.o. female on day 9 of admission presenting with Failure of outpatient treatment.    Subjective   Interval History: no fever, no new complaints        Review of Systems    Objective   Range of Vitals (last 24 hours)  Heart Rate:  [58-79]   Temp:  [35.9 °C (96.6 °F)-36.8 °C (98.2 °F)]   Resp:  [18-19]   BP: (105-155)/(64-99)   SpO2:  [95 %-98 %]   Daily Weight  04/03/24 : 104 kg (229 lb)    Body mass index is 40.57 kg/m².    Physical Exam  Constitutional:       Appearance: Normal appearance.   HENT:      Head: Normocephalic and atraumatic.      Mouth/Throat:      Mouth: Mucous membranes are moist.      Pharynx: Oropharynx is clear.   Eyes:      Pupils: Pupils are equal, round, and reactive to light.   Cardiovascular:      Rate and Rhythm: Normal rate and regular rhythm.      Heart sounds: Normal heart sounds.   Pulmonary:      Effort: Pulmonary effort is normal.      Breath sounds: Normal breath sounds.   Abdominal:      General: Abdomen is flat. Bowel sounds are normal.      Palpations: Abdomen is soft.   Musculoskeletal:      Cervical back: Normal range of motion.      Comments: Stasis  Rt heel wound   Neurological:      Mental Status: She is alert.         Antibiotics  cefepime (Maxipime) IV 2 g  vancomycin (Vancocin) 2000 mg/500 ml in D5W 500 mL IV piggyback 2,000 mg  metroNIDAZOLE (Flagyl) 500 mg in NaCl (iso-os) 100 mL  heparin (porcine) injection 7,500 Units  polyethylene glycol (Glycolax, Miralax) packet 17 g  atenolol (Tenormin) tablet 100 mg  atorvastatin (Lipitor) tablet 20 mg  fluticasone furoate-vilanteroL (Breo Ellipta) 100-25 mcg/dose inhaler 1 puff  famotidine-Ca carb-mag hydrox (Pepcid Complete) -165 mg chewable tablet 1 tablet  febuxostat (Uloric) tablet 40 mg  ferrous gluconate (Fergon) 324 (38 Fe) mg tablet 324 mg  furosemide (Lasix) tablet 40 mg  levothyroxine (Synthroid, Levoxyl) tablet 75 mcg  oxybutynin (Ditropan) tablet 5 mg  solifenacin (VESIcare) tablet 5  mg  traMADol (Ultram) tablet  famotidine (Pepcid) tablet 10 mg  alum-mag hydroxide-simeth (Mylanta) 200-200-20 mg/5 mL oral suspension 30 mL  cefepime (Maxipime) 1 g in dextrose 5% 50 mL IV  vancomycin (Vancocin) pharmacy to dose - pharmacy monitoring  acyclovir (Zovirax) capsule 400 mg  traMADol (Ultram) tablet 50 mg  ceFAZolin in dextrose (iso-os) (Ancef) IVPB 1 g      Relevant Results  Labs  Results from last 72 hours   Lab Units 04/12/24  0756 04/11/24  0647 04/10/24  0650   WBC AUTO x10*3/uL 5.9 6.8 6.7   HEMOGLOBIN g/dL 10.3* 9.7* 10.0*   HEMATOCRIT % 32.1* 30.3* 31.5*   PLATELETS AUTO x10*3/uL 219 215 239     Results from last 72 hours   Lab Units 04/12/24  0756 04/11/24  0647 04/10/24  0650   SODIUM mmol/L 135* 136 135*   POTASSIUM mmol/L 3.9 3.8 3.8   CHLORIDE mmol/L 103 103 101   CO2 mmol/L 27 27 28   BUN mg/dL 33* 32* 31*   CREATININE mg/dL 3.34* 3.31* 3.48*   GLUCOSE mg/dL 83 80 81   CALCIUM mg/dL 9.4 9.3 9.3   ANION GAP mmol/L 9* 10 10   EGFR mL/min/1.73m*2 14* 14* 13*   PHOSPHORUS mg/dL 3.2 3.1 3.0     Results from last 72 hours   Lab Units 04/12/24  0756 04/11/24  0647 04/10/24  0650   ALBUMIN g/dL 2.8* 2.6* 2.7*     Estimated Creatinine Clearance: 16.3 mL/min (A) (by C-G formula based on SCr of 3.34 mg/dL (H)).  C-Reactive Protein   Date Value Ref Range Status   04/03/2024 2.55 (H) <1.00 mg/dL Final     Microbiology  Reviewed  Imaging  Reviewed        Assessment/Plan   Right heel none healing wound / possible infection, the MRI was reviewed, no bony erosions sp bedside debridement, the culture with rare mixed skin monisha  Healing Lt thoracic zoster     Recommendations :  Supportive care  Discussed with the medical care team     I spent minutes in the professional and overall care of this patient.      Christopher Sharp MD

## 2024-04-12 NOTE — PROGRESS NOTES
04/12/24 1056   Discharge Planning   Living Arrangements Spouse/significant other   Support Systems Spouse/significant other   Assistance Needed Patient is A&Ox3, needs assistance from spouse with ADL's, he reports she is unable to bear weight on R foot due to wound, he will stand pivot her to wheelchair, she has a walker as well in the home, split level home with a chair lift, does not drive   Type of Residence Private residence   Who is requesting discharge planning? Provider   Home or Post Acute Services Post acute facilities (Rehab/SNF/etc)   Type of Post Acute Facility Services Skilled nursing   Patient expects to be discharged to: Per physician, patient is having dizziness with change of position. Transport is cancelled for today to [facility]. May DC over the weekend. Precert good through 4/13.   Does the patient need discharge transport arranged? Yes   RoundTrip coordination needed? Yes   Has discharge transport been arranged? No

## 2024-04-12 NOTE — NURSING NOTE
Assumed care of patient.  Pt expected to discharge in morning around 10:00 am to [facility].  Pt still experiencing dizziness upon standing. Pt is resting comfortably in bed with no needs at this time. Will continue to monitor.

## 2024-04-12 NOTE — NURSING NOTE
0839 218 ml on bladder scan. Aide aware we are allowing patient to urinate and will check pvr.   1025 Bladder scan shows 33 ml

## 2024-04-12 NOTE — PROGRESS NOTES
Physical Therapy    Physical Therapy Treatment    Patient Name: Magi Boyd  MRN: [id number]  Today's Date: 4/12/2024  Time Calculation  Start Time: 1234  Stop Time: 1309  Time Calculation (min): 35 min       Assessment/Plan   PT Assessment  Evaluation/Treatment Tolerance: Patient tolerated treatment well  Strengths: Support of Caregivers  Barriers to Participation: Comorbidities  End of Session Communication: Bedside nurse  End of Session Patient Position: Alarm off, caregiver present (Patient seated EOB when transport arrived.)     PT Plan  Treatment/Interventions: Bed mobility, Transfer training, Gait training, Balance training, Strengthening, Therapeutic activity, Therapeutic exercise, Home exercise program, Orthotic fitting/training  PT Plan: Skilled PT  PT Frequency: 3 times per week  PT Discharge Recommendations: Moderate intensity level of continued care  Equipment Recommended upon Discharge: Wheeled walker  PT Recommended Transfer Status: Assist x1  PT - OK to Discharge: Yes      General Visit Information:   PT  Visit  PT Received On: 04/12/24  Response to Previous Treatment: Patient with no complaints from previous session.  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), GERD (gastroesophageal reflux disease) (04/20/2023), Lower urinary tract symptoms (04/20/2023), Obesity, unspecified (08/01/2022), Otalgia, left ear (02/18/2020), Other abnormal glucose (09/29/2016), Other specified abnormal findings of blood  chemistry (10/05/2016), Other specified disorders of ear, unspecified ear (03/18/2015), Pelvic and perineal pain (10/24/2019), Personal history of other diseases of the respiratory system (12/05/2013), Personal history of other diseases of the respiratory system (03/18/2015), Personal history of other diseases of the respiratory system (12/17/2019), Personal history of other drug therapy (10/25/2019), Personal history of other endocrine, nutritional and metabolic disease, Personal history of other specified conditions (09/26/2013), Personal history of other specified conditions (10/29/2013), Personal history of other specified conditions (02/18/2020), Personal history of other specified conditions (02/18/2020), Personal history of other specified conditions (02/09/2015), Personal history of urinary (tract) infections (12/18/2018), Persons encountering health services in other specified circumstances (10/25/2019), Right knee pain (04/20/2023), Simple chronic bronchitis (CMS/HCC) (08/23/2023), SOB (shortness of breath) (08/23/2023), Toe pain (08/23/2023), Unspecified abnormal findings in urine (08/25/2021), Urinary tract infection, site not specified (10/18/2021), and Urinary tract infection, site not specified (08/30/2022).  Family/Caregiver Present: Yes  Caregiver Feedback: Spouse present  Prior to Session Communication: Bedside nurse  Patient Position Received: Bed, 3 rail up  Preferred Learning Style: verbal  General Comment: Per nurse patient had an orthostatic test done in the morning. BP was normal. (Per patient stated MD took patient off BP med and patient is feeling better although patient did experience dizziness during therapy session)    Subjective   Precautions:  Precautions  LE Weight Bearing Status: Weight Bearing as Tolerated  Medical Precautions: Fall precautions  Post-Surgical Precautions: Other (comment) (R foot surgical boot needed for standing and WB. Patient has a shoe for L foot. Patient donned  surgical boot.)  Precautions Comment: R LE WBAT in CAM boot.  Vital Signs:       Objective   Pain:  Pain Assessment  Pain Assessment: 0-10  Pain Score: 0 - No pain  Cognition:  Cognition  Overall Cognitive Status: Within Functional Limits  Orientation Level: Oriented X4  Postural Control:     Extremity/Trunk Assessments:    Activity Tolerance:  Activity Tolerance  Endurance: Tolerates 30 min exercise with multiple rests  Activity Tolerance Comments: Patient presents wtih good tolerance today  Treatments:  Therapeutic Exercise  Therapeutic Exercise Performed: Yes  Therapeutic Exercise Activity 1: Seated ther ex x 10-15 each B LE (AP (L LE only), LAQ, HS, Abd/Add, hip flex)         Bed Mobility  Bed Mobility: Yes  Bed Mobility 1  Bed Mobility 1: Supine to sitting  Level of Assistance 1: Minimum assistance  Bed Mobility Comments 1: Vc for hand placement  Bed Mobility 2  Bed Mobility  2: Sitting to supine (Patient seated EOB at EOS. Transport came to take patient for renal test)    Ambulation/Gait Training  Ambulation/Gait Training Performed: Yes  Ambulation/Gait Training 1  Surface 1: Level tile  Device 1: Rolling walker  Assistance 1: Moderate assistance  Quality of Gait 1: Inconsistent stride length, Decreased step length, Shuffling gait  Comments/Distance (ft) 1: Side stepping 6'  EOB L<>R x 3 trials. Seated rest break b/t trials (Extended time to complete.)  Transfers  Transfer: Yes  Transfer 1  Transfer From 1: Bed to  Transfer to 1: Sit, Stand  Technique 1: Sit to stand, Stand to sit  Transfer Device 1: Walker  Transfer Level of Assistance 1: Minimum assistance  Trials/Comments 1: Patient stated dizziness was better today and did not occur for sit<>stand trials.    Other Activity  Other Activity Performed: Yes  Other Activity 1: Static standing x 3 trials (Max time 5 min)    Outcome Measures:  [facility] Basic Mobility  Turning from your back to your side while in a flat bed without using bedrails: A lot  Moving from  lying on your back to sitting on the side of a flat bed without using bedrails: A little  Moving to and from bed to chair (including a wheelchair): A lot  Standing up from a chair using your arms (e.g. wheelchair or bedside chair): A lot  To walk in hospital room: A lot  Climbing 3-5 steps with railing: A lot  Basic Mobility - Total Score: 13    Education Documentation  Body Mechanics, taught by Ainsley Rodrigez PTA at 4/12/2024  1:53 PM.  Learner: Significant Other, Patient  Readiness: Acceptance  Method: Explanation, Demonstration  Response: Verbalizes Understanding, Demonstrated Understanding, Needs Reinforcement    Home Exercise Program, taught by Ainsley Rodrigez PTA at 4/12/2024  1:53 PM.  Learner: Significant Other, Patient  Readiness: Acceptance  Method: Explanation, Demonstration  Response: Verbalizes Understanding, Demonstrated Understanding, Needs Reinforcement    Precautions, taught by Ainsley Rodrigez PTA at 4/12/2024  1:53 PM.  Learner: Significant Other, Patient  Readiness: Acceptance  Method: Explanation, Demonstration  Response: Verbalizes Understanding, Demonstrated Understanding, Needs Reinforcement    Mobility Training, taught by Ainsley Rodrigez PTA at 4/12/2024  1:53 PM.  Learner: Significant Other, Patient  Readiness: Acceptance  Method: Explanation, Demonstration  Response: Verbalizes Understanding, Demonstrated Understanding, Needs Reinforcement    Education Comments  No comments found.        OP EDUCATION:       Encounter Problems       Encounter Problems (Active)       Mobility       STG - Patient will ambulate 50ft with FWW (Progressing)       Start:  04/06/24    Expected End:  04/10/24            Pt will complete 15 reps of BLE therex to improve strength (Progressing)       Start:  04/06/24    Expected End:  04/10/24               PT Transfers       STG - Patient will perform bed mobility Kelly (Progressing)       Start:  04/06/24    Expected End:  04/10/24            STG - Patient  will transfer sit to and from stand with FWW with SBA (Progressing)       Start:  04/06/24    Expected End:  04/10/24               Safety       Pt will independently don/doff CAM boot (Progressing)       Start:  04/06/24    Expected End:  04/10/24

## 2024-04-13 VITALS
RESPIRATION RATE: 18 BRPM | HEIGHT: 63 IN | SYSTOLIC BLOOD PRESSURE: 112 MMHG | WEIGHT: 229 LBS | OXYGEN SATURATION: 94 % | TEMPERATURE: 98 F | DIASTOLIC BLOOD PRESSURE: 57 MMHG | HEART RATE: 67 BPM | BODY MASS INDEX: 40.57 KG/M2

## 2024-04-13 DIAGNOSIS — R52 PAIN: Primary | ICD-10-CM

## 2024-04-13 LAB
ALBUMIN SERPL BCP-MCNC: 2.6 G/DL (ref 3.4–5)
ANION GAP SERPL CALC-SCNC: 12 MMOL/L (ref 10–20)
BUN SERPL-MCNC: 31 MG/DL (ref 6–23)
CALCIUM SERPL-MCNC: 9.2 MG/DL (ref 8.6–10.3)
CHLORIDE SERPL-SCNC: 105 MMOL/L (ref 98–107)
CO2 SERPL-SCNC: 25 MMOL/L (ref 21–32)
CREAT SERPL-MCNC: 3.16 MG/DL (ref 0.5–1.05)
EGFRCR SERPLBLD CKD-EPI 2021: 15 ML/MIN/1.73M*2
ERYTHROCYTE [DISTWIDTH] IN BLOOD BY AUTOMATED COUNT: 13.9 % (ref 11.5–14.5)
GLUCOSE SERPL-MCNC: 82 MG/DL (ref 74–99)
HCT VFR BLD AUTO: 30.7 % (ref 36–46)
HGB BLD-MCNC: 9.7 G/DL (ref 12–16)
MAGNESIUM SERPL-MCNC: 1.85 MG/DL (ref 1.6–2.4)
MCH RBC QN AUTO: 30.9 PG (ref 26–34)
MCHC RBC AUTO-ENTMCNC: 31.6 G/DL (ref 32–36)
MCV RBC AUTO: 98 FL (ref 80–100)
NRBC BLD-RTO: 0 /100 WBCS (ref 0–0)
PHOSPHATE SERPL-MCNC: 3.2 MG/DL (ref 2.5–4.9)
PLATELET # BLD AUTO: 204 X10*3/UL (ref 150–450)
POTASSIUM SERPL-SCNC: 3.8 MMOL/L (ref 3.5–5.3)
RBC # BLD AUTO: 3.14 X10*6/UL (ref 4–5.2)
SODIUM SERPL-SCNC: 138 MMOL/L (ref 136–145)
WBC # BLD AUTO: 5.8 X10*3/UL (ref 4.4–11.3)

## 2024-04-13 PROCEDURE — 85027 COMPLETE CBC AUTOMATED: CPT

## 2024-04-13 PROCEDURE — 99239 HOSP IP/OBS DSCHRG MGMT >30: CPT | Performed by: STUDENT IN AN ORGANIZED HEALTH CARE EDUCATION/TRAINING PROGRAM

## 2024-04-13 PROCEDURE — 2500000001 HC RX 250 WO HCPCS SELF ADMINISTERED DRUGS (ALT 637 FOR MEDICARE OP)

## 2024-04-13 PROCEDURE — 2500000004 HC RX 250 GENERAL PHARMACY W/ HCPCS (ALT 636 FOR OP/ED)

## 2024-04-13 PROCEDURE — 36415 COLL VENOUS BLD VENIPUNCTURE: CPT

## 2024-04-13 PROCEDURE — 83735 ASSAY OF MAGNESIUM: CPT

## 2024-04-13 PROCEDURE — 80069 RENAL FUNCTION PANEL: CPT

## 2024-04-13 RX ORDER — TRAMADOL HYDROCHLORIDE 50 MG/1
50 TABLET ORAL EVERY 8 HOURS PRN
Qty: 21 TABLET | Refills: 0 | Status: SHIPPED | OUTPATIENT
Start: 2024-04-13 | End: 2024-04-20

## 2024-04-13 RX ADMIN — FLUTICASONE FUROATE AND VILANTEROL TRIFENATATE 1 PUFF: 100; 25 POWDER RESPIRATORY (INHALATION) at 09:11

## 2024-04-13 RX ADMIN — LEVOTHYROXINE SODIUM 75 MCG: 75 TABLET ORAL at 06:16

## 2024-04-13 RX ADMIN — Medication 1 APPLICATION: at 09:13

## 2024-04-13 RX ADMIN — FERROUS GLUCONATE 324 MG: 324 TABLET ORAL at 09:12

## 2024-04-13 RX ADMIN — FEBUXOSTAT 40 MG: 40 TABLET ORAL at 09:12

## 2024-04-13 RX ADMIN — HEPARIN SODIUM 7500 UNITS: 5000 INJECTION INTRAVENOUS; SUBCUTANEOUS at 06:15

## 2024-04-13 ASSESSMENT — COGNITIVE AND FUNCTIONAL STATUS - GENERAL
HELP NEEDED FOR BATHING: A LOT
DAILY ACTIVITIY SCORE: 16
PERSONAL GROOMING: A LITTLE
WALKING IN HOSPITAL ROOM: A LOT
CLIMB 3 TO 5 STEPS WITH RAILING: A LOT
TURNING FROM BACK TO SIDE WHILE IN FLAT BAD: A LITTLE
MOVING TO AND FROM BED TO CHAIR: A LOT
DRESSING REGULAR UPPER BODY CLOTHING: A LITTLE
TOILETING: A LOT
DRESSING REGULAR LOWER BODY CLOTHING: A LOT
MOBILITY SCORE: 13
MOVING FROM LYING ON BACK TO SITTING ON SIDE OF FLAT BED WITH BEDRAILS: A LOT
STANDING UP FROM CHAIR USING ARMS: A LOT

## 2024-04-13 ASSESSMENT — PAIN - FUNCTIONAL ASSESSMENT: PAIN_FUNCTIONAL_ASSESSMENT: 0-10

## 2024-04-13 ASSESSMENT — PAIN SCALES - GENERAL: PAINLEVEL_OUTOF10: 0 - NO PAIN

## 2024-04-13 NOTE — NURSING NOTE
Assumed care of patient.  Family at bedside.  Discussed plan to discharge in the morning.  Pt resting comfortably with no needs at this time.  Call light within reach.

## 2024-04-13 NOTE — CARE PLAN
The patient's goals for the shift include  comfort and rest    The clinical goals for the shift include pt safety and comfort

## 2024-04-13 NOTE — PROGRESS NOTES
04/13/2024 0753am  Patient has insurance auth for [employer] that expires at 23:59pm today. Patient had confirmed transport with [facility] for 10am this morning. [facility] phoned and stated call offs and had to reschedule for 5pm today. I phoned [facility] [phone] and have an 11am to 12pm stretcher confirmed. Will notify [facility] once patient is picked up. Spoke with patient bedside and made her aware. She stated she would notify her family.    Neb/mdi every 4-6 hours  Wean as improves   F/u 1 week\  Note for school

## 2024-04-13 NOTE — DISCHARGE SUMMARY
Discharge Diagnosis  Heart failure with preserved EF  Heel wound infection s/p debridement  Iatrogenic orthostatic hypotension  JAKI on CKD    Issues Requiring Follow-Up  Beta-blocker - Cardiology/PCP  Wound care - Wound clinic/podiatry    Discharge Meds     Your medication list        CHANGE how you take these medications        Instructions Last Dose Given Next Dose Due   atenolol 25 mg tablet  Commonly known as: Tenormin  What changed:   medication strength  how much to take      Take 1 tablet (25 mg) by mouth once daily.       atorvastatin 20 mg tablet  Commonly known as: Lipitor  What changed: when to take this      Take 1 tablet (20 mg) by mouth once daily.       furosemide 20 mg tablet  Commonly known as: Lasix  What changed:   when to take this  reasons to take this      Take 2 tablets (40 mg) by mouth once daily as needed (For weight gain >2kg).       levothyroxine 75 mcg tablet  Commonly known as: Synthroid, Levoxyl  What changed: when to take this      TAKE ONE TABLET BY MOUTH EVERY DAY       solifenacin 5 mg tablet  Commonly known as: VESIcare  What changed: when to take this      Take 1 tablet (5 mg) by mouth once daily. Swallow tablet whole; do not crush, chew, or split.              CONTINUE taking these medications        Instructions Last Dose Given Next Dose Due   budesonide-formoteroL 160-4.5 mcg/actuation inhaler  Commonly known as: Symbicort      Inhale 2 puffs once daily.       ergocalciferol 1.25 MG (22892 UT) capsule  Commonly known as: Vitamin D-2      Take 1 capsule (1,250 mcg) by mouth 1 (one) time per week.       estradiol 0.01 % (0.1 mg/gram) vaginal cream  Commonly known as: Estrace      Insert 2 g into the vagina once daily. Apply to vagina nightly for 1 week then every Monday/Wednesday/Friday.       febuxostat 40 mg tablet  Commonly known as: Uloric           ferrous gluconate 324 (38 Fe) mg tablet  Commonly known as: Fergon      TAKE ONE TABLET BY MOUTH TWO TIMES A DAY       traMADol  50 mg tablet  Commonly known as: Ultram                  STOP taking these medications      acyclovir 400 mg tablet  Commonly known as: Zovirax        acyclovir 5 % ointment  Commonly known as: Zovirax        albuterol 90 mcg/actuation inhaler        famotidine-Ca carb-mag hydrox -165 mg chewable tablet  Commonly known as: Pepcid Complete        oxybutynin 5 mg tablet  Commonly known as: Ditropan                  Where to Get Your Medications        These medications were sent to [id number] [place] [id number] [address]      Phone: [phone]   ferrous gluconate 324 (38 Fe) mg tablet       These medications were sent to [facility]  [address] [address] [facility] [address]      Hours: 9 AM to 5 PM Mon-Fri Phone: [phone]   atenolol 25 mg tablet  furosemide 20 mg tablet         Test Results Pending At Discharge  Pending Labs       No current pending labs.            Hospital Course  Magi Boyd is a 79 y.o. female with a PMHx of gout, HTN, chronic venous insufficiency, CKD stage IV, hypothyroidism, overactive bladder, asthma who presented for C/O worsening right heel wound x 2-3 days despite out-pt abx tx. She was advised by her podiatrist to come to the ED.     In the ED, the patient was nonseptic appearing, but was transitioned to IV antibiotics (cefepime/vanc) for suspected osteomyelitis. Podiatry was consulted. X-ray of the right foot showed no osteomyelitis but was nondiagnostic. MRI  showed plantar heel soft tissue ulceration with subjacent cellulitis and mild reactive osteitis along the plantar cortex of posterior calcaneal tuberosity with no definite MRI evidence of osteomyelitis.     On the floors, the patient was placed on contact precautions for recent episode of shingles with no signs of disseminated shingles. The patient did not have any open sores at this time. ID was consulted and recommended treatment with cefazolin  for right heel non-healing wound / possible cellulitis. Podiatry debrided wound 4/6. Patient is stable for discharge home.    Medication changes:  Atenolol - held for hypotension and bradycardia  Oxybutinin - held for hypotension and bradycardia  Furosemide - changed to PRN for weight gain >2kg    Please follow up with the following providers outpatient:  - PCP  - Podiatry  - Cardiology    Pertinent Physical Exam At Time of Discharge  Physical Exam  Constitutional:       General: She is not in acute distress.     Appearance: Normal appearance. She is obese. She is not ill-appearing.   HENT:      Head: Normocephalic and atraumatic.      Nose: Nose normal.      Mouth/Throat:      Mouth: Mucous membranes are moist.      Pharynx: Oropharynx is clear.   Eyes:      Extraocular Movements: Extraocular movements intact.      Pupils: Pupils are equal, round, and reactive to light.   Cardiovascular:      Rate and Rhythm: Normal rate and regular rhythm.      Heart sounds: Normal heart sounds. No murmur heard.     No friction rub. No gallop.   Pulmonary:      Effort: Pulmonary effort is normal. No respiratory distress.      Breath sounds: Normal breath sounds. No wheezing, rhonchi or rales.   Abdominal:      General: Abdomen is flat. Bowel sounds are normal. There is no distension.      Palpations: Abdomen is soft.      Tenderness: There is no abdominal tenderness. There is no guarding or rebound.   Musculoskeletal:         General: Normal range of motion.      Right lower leg: No edema.      Left lower leg: No edema.   Skin:     General: Skin is warm and dry.      Findings: Erythema and lesion present.   Neurological:      General: No focal deficit present.      Mental Status: She is alert and oriented to person, place, and time.       Outpatient Follow-Up  Future Appointments   Date Time Provider Department Center   6/4/2024  2:40 PM Laura Narayan MD [id number] [facility]   6/20/2024  1:40 PM Arsen Allan MD [id number] [facility]        Christiano Roman MD

## 2024-04-17 ENCOUNTER — NURSING HOME VISIT (OUTPATIENT)
Dept: POST ACUTE CARE | Facility: EXTERNAL LOCATION | Age: 79
End: 2024-04-17
Payer: MEDICARE

## 2024-04-17 DIAGNOSIS — N18.4 STAGE 4 CHRONIC KIDNEY DISEASE (MULTI): ICD-10-CM

## 2024-04-17 DIAGNOSIS — L30.4 INTERTRIGO: ICD-10-CM

## 2024-04-17 DIAGNOSIS — M10.9 GOUT, UNSPECIFIED CAUSE, UNSPECIFIED CHRONICITY, UNSPECIFIED SITE: ICD-10-CM

## 2024-04-17 DIAGNOSIS — R00.1 BRADYCARDIA: ICD-10-CM

## 2024-04-17 DIAGNOSIS — G89.29 CHRONIC RIGHT SHOULDER PAIN: ICD-10-CM

## 2024-04-17 DIAGNOSIS — J44.9 CHRONIC OBSTRUCTIVE PULMONARY DISEASE, UNSPECIFIED COPD TYPE (MULTI): ICD-10-CM

## 2024-04-17 DIAGNOSIS — R53.81 PHYSICAL DECONDITIONING: ICD-10-CM

## 2024-04-17 DIAGNOSIS — L89.610 PRESSURE INJURY OF RIGHT HEEL, UNSTAGEABLE (MULTI): Primary | ICD-10-CM

## 2024-04-17 DIAGNOSIS — J45.909 ASTHMA, UNSPECIFIED ASTHMA SEVERITY, UNSPECIFIED WHETHER COMPLICATED, UNSPECIFIED WHETHER PERSISTENT (HHS-HCC): ICD-10-CM

## 2024-04-17 DIAGNOSIS — I95.9 HYPOTENSION, UNSPECIFIED HYPOTENSION TYPE: ICD-10-CM

## 2024-04-17 DIAGNOSIS — N32.81 OAB (OVERACTIVE BLADDER): ICD-10-CM

## 2024-04-17 DIAGNOSIS — R53.1 GENERALIZED WEAKNESS: ICD-10-CM

## 2024-04-17 DIAGNOSIS — E03.9 ACQUIRED HYPOTHYROIDISM: ICD-10-CM

## 2024-04-17 DIAGNOSIS — M25.511 CHRONIC RIGHT SHOULDER PAIN: ICD-10-CM

## 2024-04-17 DIAGNOSIS — L03.115 CELLULITIS OF RIGHT HEEL: ICD-10-CM

## 2024-04-17 PROCEDURE — 99310 SBSQ NF CARE HIGH MDM 45: CPT | Performed by: NURSE PRACTITIONER

## 2024-04-17 NOTE — LETTER
"Patient: Magi Boyd  : 1945    Encounter Date: 2024    Chief Complaint:   SNF F/U  R heel wound infection  JAKI on CKD  Iatrogenic orthostatic hypotension  Physical deconditioning/generalized weakness    HPI:   79 year-old female presenting to the ER on 4/3/24 with c/o worsening R heel wound x 2-3 days despite PO antibiotics. She was advised by her podiatry to come to the ER for evaluation. Work-up in ER: XR of R foot showed no osteomyelitis but was non-diagnostic, MRI showed plantar heel soft tissue ulceration with subjacent cellulitis and mild reactive osteitis along the plantar cortex of posterior calcaneal tuberosity with no definite MRI evidence of osteomyelitis. She was started on IV ATB and admitted to the hospital for further evaluation and treatment. Hospital course:    R heel wound/cellulitis-podiatry consult, ID consult, IV ATB, wound debrided by podiatry on , ID recommended treatment with Cefazolin for R heel non-healing wound with possible cellulitis   Healing L thoracic shingles-placed on contact precautions, no open sores noted   Physical deconditioning-PT/OT evaluations, recommending SNF  Hypotension/bradycardia-atenolol and oxybutynin held, furosemide changes to prn for weight gain > 2 kg, BP and HR monitored    Pt. was HDS and discharged to [facility] on 24. Today, patient reports that she is feeling well. She reports improvement in BLE lymphedema and R heel pain. She reports episodes of \"lightheadedness\" at times. She denies any dizziness or vision changes. She reports episodes of R shoulder pain, worse with movement and improved with rest, states that heat does help with the pain. She denies any SOB, cough, chest pain or palpitations, fevers, chills, or appetite changes. Staff report no clinical concerns.     ROS:    As above in HPI. Otherwise, all other systems have been reviewed and are negative for complaint.    Medications reviewed and verified in NH chart. "     Patient Active Problem List   Diagnosis   • Asthma ([facility])   • Benign hypertension with CKD (chronic kidney disease) stage IV (Multi)   • Bilateral leg and foot pain   • COPD (chronic obstructive pulmonary disease) (Multi)   • Stage 4 chronic kidney disease (Multi)   • PAD (peripheral artery disease) (CMS-HCC)   • Allergic rhinitis   • Arthritis   • Chronic sinusitis   • Hyperlipidemia   • Acquired hypothyroidism   • OAB (overactive bladder)   • Gout   • Venous insufficiency   • Lymphedema   • Closed fracture of phalanx of toe of right foot   • Skin ulcer of right heel (Multi)        Past Medical History:   Diagnosis Date   • Acute sinus infection    • Acute UTI 04/20/2023   • Adverse reaction to NSAIDs, sequela 04/20/2023   • Disorder of both eustachian tubes 04/20/2023   • 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   • GERD (gastroesophageal reflux disease) 04/20/2023   • Lower urinary tract symptoms 04/20/2023   • Nasal congestion    • Otalgia, left ear 02/18/2020    Earache, left   • Other abnormal glucose 09/29/2016    Abnormal glucose   • Other specified disorders of ear, unspecified ear 03/18/2015    Fullness in ear   • Pelvic and perineal pain 10/24/2019    Pelvic pressure in female   • Postnasal drip    • Right knee pain 04/20/2023   • Simple chronic bronchitis (Multi) 08/23/2023   • SOB (shortness of breath) 08/23/2023   • Toe pain 08/23/2023   • URI (upper respiratory infection)        No past surgical history on file.    Family History   Problem Relation Name Age of Onset   • Heart disease Mother     • Brain cancer Father     • Lung cancer Father     • Heart disease Brother     • Heart disease Brother     • Heart disease Brother     • Heart disease Brother     • Diabetes Other FAM HX        Social History     Tobacco Use   Smoking Status Never   Smokeless Tobacco Never       Social History     Substance and Sexual Activity   Alcohol Use Not Currently    Comment: social alcohol use       Social History      Substance and Sexual Activity   Drug Use Never       Allergies   Allergen Reactions   • Levofloxacin Other   • Linezolid Other     Blisters in mouth    • Nitrofurantoin Monohyd/M-Cryst Other   • Penicillins Swelling and Other        Vital Signs:   116/68-78-18-97.2-98% on RA     Physical Exam:  General: Sitting up in WC in NAD, alert   Head/Face: NCAT, symmetrical  Eyes: PERRLA, no injection, no discharge  ENT: Hearing not impaired, ears without scars or lesions, nasal mucosa and turbinates pink, septum midline, lips pink and moist  Neck: Supple, symmetrical  Respiratory: CTA but diminished without adventitious sounds, respirations even and nonlabored without use of accessory muscles, good air exchange  Cardio: RRR without murmur or gallops, normal S1S2, chronic lymphedema of BLE (patient reports overall improvement since hospitalization), pedal pulses 2+/4 bilaterally  Chest/Breast: Symmetrical  GI: BS x 4, normoactive, non-distended, abd round and soft, no masses or tenderness  : No suprapubic tenderness or distention  MSK: Gait not assessed, joints with full ROM without pain or contractures, walking boot to RLE  Skin: Skin warm and dry, no induration, venous stasis changes to BLE, DSG to R heel D&I (unstageable pressure ulcer), intertrigo of bilateral breast folds   Neurologic: Cranial nerves II through XII intact, superficial touch and pain sensation intact  Psychiatric: Alert to person, place, and time, calm and cooperative     Results/Data:    Latest Reference Range & Units 04/13/24 07:27   GLUCOSE 74 - 99 mg/dL 82   SODIUM 136 - 145 mmol/L 138   POTASSIUM 3.5 - 5.3 mmol/L 3.8   CHLORIDE 98 - 107 mmol/L 105   Bicarbonate 21 - 32 mmol/L 25   Anion Gap 10 - 20 mmol/L 12   Blood Urea Nitrogen 6 - 23 mg/dL 31 (H)   Creatinine 0.50 - 1.05 mg/dL 3.16 (H)   EGFR >60 mL/min/1.73m*2 15 (L)   Calcium 8.6 - 10.3 mg/dL 9.2   PHOSPHORUS 2.5 - 4.9 mg/dL 3.2   Albumin 3.4 - 5.0 g/dL 2.6 (L)   MAGNESIUM 1.60 - 2.40  mg/dL 1.85   LEUKOCYTES (10*3/UL) IN BLOOD BY AUTOMATED COUNT, [place] 4.4 - 11.3 x10*3/uL 5.8   nRBC 0.0 - 0.0 /100 WBCs 0.0   ERYTHROCYTES (10*6/UL) IN BLOOD BY AUTOMATED COUNT, [place] 4.00 - 5.20 x10*6/uL 3.14 (L)   HEMOGLOBIN 12.0 - 16.0 g/dL 9.7 (L)   HEMATOCRIT 36.0 - 46.0 % 30.7 (L)   MCV 80 - 100 fL 98   MCH 26.0 - 34.0 pg 30.9   MCHC 32.0 - 36.0 g/dL 31.6 (L)   RED CELL DISTRIBUTION WIDTH 11.5 - 14.5 % 13.9   PLATELETS (10*3/UL) IN BLOOD AUTOMATED COUNT, [place] 150 - 450 x10*3/uL 204   (H): Data is abnormally high  (L): Data is abnormally low    Assessment/Plan:  Non-healing wound of R heel with possible cellulitis-s/p debridement on 4/6, completed course of IV Cefazolin, c/w local wound care, offloading, monitor CBC weekly, monitor VS, wound care team following, Tramadol prn for moderate to severe pain, F/U with podiatry after discharge  Recent L thoracic shingles-no open sores noted, does not require isolation at this time  Physical deconditioning/weakness-c/w PT/OT, safety and fall precautions  Hypotension/bradycardia-RESOLVED, continue to monitor BP and HR closely   Lightheadedness-increase fluid intake, change positions slowly, monitor VS  R shoulder pain-likely 2/2 OA, encourage ROM, Tylenol prn, Biofreeze Q 6 hours prn  Intertrigo of breast folds-nystatin powder BID and prn  HTN/HLD/lymphedema-2 gm Na+ diet, c/w atenolol and atorvastatin, c/w lasix prn, monitor BP and HR, monitor weights, F/U with cardiologist after discharge  Anemia of chronic disease-c/w iron supplement, monitor CBC weekly and prn  CKD Stage IV-avoid nephrotoxic medications, renally dose medications as able, monitor BMP weekly and prn, F/U with nephrologist as scheduled  Hypothyroidism-c/w levothyroxine  OAB/Hx UTIs-c/w vesicare and estradiol, F/U with urologist as scheduled  Vitamin D deficiency-c/w supplement  Gout-c/w Uloric, monitor uric acid level  Asthma/COPD-c/w Symbicort, Albuterol prn, monitor respiratory status  closely     Orders:  CBC w/ diff and BMP Q Thursday x 6 weeks  Biofreeze Q 6 hours prn for R shoulder pain  Nystatin powder BID to breast folds    Code Status:   Full Code    Time spent reviewing patient's chart on the unit (PMH, PSH, FH, Social Hx AND progress and/or consult notes, labs, and radiology results): 25 minutes  Time spent interviewing and/or examining the patient: 10 minutes  Time spent writing note on the unit: 5 minutes  Time spent reviewing POC w/ patient, family, and/or staff: 5 minutes  Total visit time: 45 minutes. Greater than 50% of time was spent on counseling and/or coordination of care of the patient. Start time: 11:30 AM, End time: 12:15 PM.    DME Requirements:  Patient will require a hospital bed for discharge home. Pt. has COPD and asthma and requires the HOB to be elevated more than 30 degrees.      Electronically Signed By: MEENU Lamb   4/24/24 10:22 PM

## 2024-04-18 ENCOUNTER — NURSING HOME VISIT (OUTPATIENT)
Dept: POST ACUTE CARE | Facility: EXTERNAL LOCATION | Age: 79
End: 2024-04-18
Payer: MEDICARE

## 2024-04-18 DIAGNOSIS — N18.4 STAGE 4 CHRONIC KIDNEY DISEASE (MULTI): ICD-10-CM

## 2024-04-18 DIAGNOSIS — E78.5 HYPERLIPIDEMIA, UNSPECIFIED HYPERLIPIDEMIA TYPE: ICD-10-CM

## 2024-04-18 DIAGNOSIS — N18.4 BENIGN HYPERTENSION WITH CKD (CHRONIC KIDNEY DISEASE) STAGE IV (MULTI): ICD-10-CM

## 2024-04-18 DIAGNOSIS — L97.419 HEEL ULCERATION, RIGHT, WITH UNSPECIFIED SEVERITY (MULTI): ICD-10-CM

## 2024-04-18 DIAGNOSIS — J44.9 CHRONIC OBSTRUCTIVE PULMONARY DISEASE, UNSPECIFIED COPD TYPE (MULTI): ICD-10-CM

## 2024-04-18 DIAGNOSIS — I12.9 BENIGN HYPERTENSION WITH CKD (CHRONIC KIDNEY DISEASE) STAGE IV (MULTI): ICD-10-CM

## 2024-04-18 DIAGNOSIS — I73.9 PAD (PERIPHERAL ARTERY DISEASE) (CMS-HCC): ICD-10-CM

## 2024-04-18 DIAGNOSIS — I89.0 LYMPHEDEMA: ICD-10-CM

## 2024-04-18 DIAGNOSIS — E03.9 ACQUIRED HYPOTHYROIDISM: ICD-10-CM

## 2024-04-18 PROCEDURE — 99306 1ST NF CARE HIGH MDM 50: CPT | Performed by: INTERNAL MEDICINE

## 2024-04-18 ASSESSMENT — COGNITIVE AND FUNCTIONAL STATUS - GENERAL
TURNING FROM BACK TO SIDE WHILE IN FLAT BAD: A LITTLE
STANDING UP FROM CHAIR USING ARMS: A LOT
MOVING FROM LYING ON BACK TO SITTING ON SIDE OF FLAT BED WITH BEDRAILS: A LOT
WALKING IN HOSPITAL ROOM: A LOT
CLIMB 3 TO 5 STEPS WITH RAILING: A LOT
MOVING TO AND FROM BED TO CHAIR: A LOT
MOBILITY SCORE: 13

## 2024-04-18 NOTE — LETTER
Patient: Magi Boyd  : 1945    Encounter Date: 2024    Name: Magi Boyd  : 1945  MRN: [id number]  Visit Date: 2024  Chief Complaint: HISTORY AND PHYSICAL    HPI: 80 y/o, Full Code, worsening right heel wound despite being on atb's. She was started on IV atb for suspected OM. MRI showed plantar heel soft tissue ulceration with subjacent cellulitis and mild reactive osteitis along the plantar cortex of posterior calcaneal tuberosity with no definite MRI evidence of osteomyelitis. ID was consulted and recommended treatment with cefazolin for right heel non-healing wound / possible cellulitis. Podiatry debrided wound . Once stabilized, pt was brought to [facility] on 2024 for ongoing med mgt and therapy services.     Subjective: Seen and examined today. Sitting up in bed. Reviewed hospitalization with her. Denies N/V/D/C/CP. Reports that therapy is going ok. Right heel wound and rash under breast present.     The patient was counseled regarding diagnostic results, instructions for management, risk factor reductions, prognosis, patient and family education, impressions, risks and benefits of treatment options and importance of compliance with treatment. I have reviewed nursing notes since my last visit and document any significant changes Reviewed orders, medications, Labs. Reviewed chart looking at current medications, treatments, labs, x-rays etc.     ROS:  As above in subjective. Otherwise, all other systems have been reviewed and are negative for complaint.    Medications:  Medications reviewed and verified in NH chart.     Past Medical History:   Diagnosis Date   • Acute sinus infection    • Acute UTI 2023   • Adverse reaction to NSAIDs, sequela 2023   • Disorder of both eustachian tubes 2023   • Familial hypercholesteremia 2023   • GERD (gastroesophageal reflux disease) 2023   • Lower urinary tract symptoms 2023   • Nasal congestion    •  Otalgia, left ear 02/18/2020    Earache, left   • Other abnormal glucose 09/29/2016    Abnormal glucose   • Other specified disorders of ear, unspecified ear 03/18/2015    Fullness in ear   • Pelvic and perineal pain 10/24/2019    Pelvic pressure in female   • Postnasal drip    • Right knee pain 04/20/2023   • Simple chronic bronchitis (Multi) 08/23/2023   • SOB (shortness of breath) 08/23/2023   • Toe pain 08/23/2023   • URI (upper respiratory infection)      No past surgical history on file.    Family History   Problem Relation Name Age of Onset   • Heart disease Mother     • Brain cancer Father     • Lung cancer Father     • Heart disease Brother     • Heart disease Brother     • Heart disease Brother     • Heart disease Brother     • Diabetes Other FAM HX      Social History     Tobacco Use   • Smoking status: Never   • Smokeless tobacco: Never   Substance Use Topics   • Alcohol use: Not Currently     Comment: social alcohol use     Allergies   Allergen Reactions   • Levofloxacin Other   • Linezolid Other     Blisters in mouth    • Nitrofurantoin Monohyd/M-Cryst Other   • Penicillins Swelling and Other      Vital Signs were reviewed in nursing home documentation.    Physical Exam  Vitals reviewed.   Constitutional:       General: She is not in acute distress.     Appearance: Normal appearance. She is not ill-appearing.   HENT:      Head: Normocephalic and atraumatic.      Right Ear: Tympanic membrane and external ear normal.      Left Ear: Tympanic membrane and external ear normal.      Nose: Nose normal.      Mouth/Throat:      Mouth: Mucous membranes are moist.      Pharynx: Oropharynx is clear.   Eyes:      Conjunctiva/sclera: Conjunctivae normal.      Pupils: Pupils are equal, round, and reactive to light.   Cardiovascular:      Rate and Rhythm: Normal rate and regular rhythm.      Heart sounds: Normal heart sounds. No murmur heard.  Pulmonary:      Effort: Pulmonary effort is normal. No respiratory  distress.      Breath sounds: Decreased breath sounds present. No wheezing.   Abdominal:      General: There is no distension.      Palpations: Abdomen is soft. There is no mass.      Tenderness: There is no abdominal tenderness.   Musculoskeletal:         General: Normal range of motion.      Cervical back: Normal range of motion and neck supple.   Skin:     General: Skin is warm and dry.      Comments: venous stasis changes to BLE, chronic lymphedema, DSG to R heel D&I (unstageable pressure ulcer), intertrigo of bilateral breast folds    Neurological:      General: No focal deficit present.      Mental Status: She is alert and oriented to person, place, and time.      Sensory: No sensory deficit.      Motor: No weakness.      Coordination: Coordination normal.      Gait: Gait normal.   Psychiatric:         Mood and Affect: Mood normal.         Behavior: Behavior normal.       Lab Results   Component Value Date    WBC 5.0 04/25/2024    HGB 8.9 (L) 04/25/2024    HCT 27.4 (L) 04/25/2024     (L) 04/25/2024    CHOL 167 11/03/2022    TRIG 174 (H) 11/03/2022    HDL 36.7 (A) 11/03/2022    ALT 5 (L) 04/08/2024    AST 31 04/08/2024     04/25/2024    K 4.2 04/25/2024     04/25/2024    CREATININE 2.47 (H) 04/25/2024    BUN 33 (H) 04/25/2024    CO2 24 04/25/2024    TSH 3.29 11/04/2023    INR 1.1 04/13/2023     Results were reviewed and addressed accordingly. Lab Results were also reviewed in [facility].     Provider Impression:   Non healing right heel wound with cellulitis s/p debridement on 4/6/2024  - completed course of cefazolin  - c/w wound care  - no leukocytosis  - PRN pain medication  - FU with DPM as scheduled    Recent Left Thoracic Shingles  - no open sores, completed tx, isolation no necessary    IFM, Weakness  - cw PT OT eval and tx    Intertrigo of bilateral breasts  - nystatin    Iron Deficiency  - c/w ferrous gluconate  - monitor H&H    Hypothyroidism  - c/w synthroid    CKD, stage 4  - monitor  kidney function qwk and PRN  - renally dose medication where able    H/o COPD  - c/w symbicort    HLD  - c/w statin    H/o Gout  - c/w uloric    HTN  - c/w atenolol    Code Status  - Full Code    ----------------  Written by Ilene Sorensen RN, acting as a scribe for Dr. Cardoso. This note accurately reflects the work and decisions made by Dr. Cardoso.     I, Dr. Cardoso, attest all medical record entries made by the scribe were under my direction and were personally dictated by me. I have reviewed the chart and agree that the record accurately reflects my performance of the history, physical exam, and assessment and plan.       Electronically Signed By: Thi Guerrier MD   4/30/24  9:34 AM

## 2024-04-24 PROBLEM — J44.9 COPD (CHRONIC OBSTRUCTIVE PULMONARY DISEASE) (MULTI): Status: ACTIVE | Noted: 2023-04-20

## 2024-04-24 PROBLEM — R60.9 EDEMA: Status: RESOLVED | Noted: 2023-08-23 | Resolved: 2024-04-24

## 2024-04-24 PROBLEM — I87.2 VENOUS INSUFFICIENCY: Status: ACTIVE | Noted: 2024-04-24

## 2024-04-24 PROBLEM — N18.4 STAGE 4 CHRONIC KIDNEY DISEASE (MULTI): Status: ACTIVE | Noted: 2023-04-20

## 2024-04-24 PROBLEM — I89.0 LYMPHEDEMA: Status: ACTIVE | Noted: 2024-04-24

## 2024-04-24 PROBLEM — M79.671 BILATERAL LEG AND FOOT PAIN: Status: ACTIVE | Noted: 2023-04-20

## 2024-04-24 PROBLEM — M10.9 GOUT: Status: ACTIVE | Noted: 2024-04-24

## 2024-04-24 PROBLEM — M79.605 BILATERAL LEG AND FOOT PAIN: Status: ACTIVE | Noted: 2023-04-20

## 2024-04-24 PROBLEM — M79.672 BILATERAL LEG AND FOOT PAIN: Status: ACTIVE | Noted: 2023-04-20

## 2024-04-24 PROBLEM — M79.604 BILATERAL LEG AND FOOT PAIN: Status: ACTIVE | Noted: 2023-04-20

## 2024-04-24 PROBLEM — S92.919A CLOSED FRACTURE OF PHALANX OF TOE: Status: ACTIVE | Noted: 2023-08-08

## 2024-04-24 PROBLEM — L97.419: Status: ACTIVE | Noted: 2024-04-24

## 2024-04-24 PROBLEM — S92.911A: Status: ACTIVE | Noted: 2023-08-08

## 2024-04-25 ENCOUNTER — NURSING HOME VISIT (OUTPATIENT)
Dept: POST ACUTE CARE | Facility: EXTERNAL LOCATION | Age: 79
End: 2024-04-25
Payer: MEDICARE

## 2024-04-25 DIAGNOSIS — M79.604 BILATERAL LEG AND FOOT PAIN: ICD-10-CM

## 2024-04-25 DIAGNOSIS — L97.419 HEEL ULCERATION, RIGHT, WITH UNSPECIFIED SEVERITY (MULTI): ICD-10-CM

## 2024-04-25 DIAGNOSIS — M79.671 BILATERAL LEG AND FOOT PAIN: ICD-10-CM

## 2024-04-25 DIAGNOSIS — I87.2 VENOUS INSUFFICIENCY: ICD-10-CM

## 2024-04-25 DIAGNOSIS — N18.4 STAGE 4 CHRONIC KIDNEY DISEASE (MULTI): ICD-10-CM

## 2024-04-25 DIAGNOSIS — M79.672 BILATERAL LEG AND FOOT PAIN: ICD-10-CM

## 2024-04-25 DIAGNOSIS — I12.9 BENIGN HYPERTENSION WITH CKD (CHRONIC KIDNEY DISEASE) STAGE IV (MULTI): ICD-10-CM

## 2024-04-25 DIAGNOSIS — M79.605 BILATERAL LEG AND FOOT PAIN: ICD-10-CM

## 2024-04-25 DIAGNOSIS — J44.9 CHRONIC OBSTRUCTIVE PULMONARY DISEASE, UNSPECIFIED COPD TYPE (MULTI): ICD-10-CM

## 2024-04-25 DIAGNOSIS — E03.9 ACQUIRED HYPOTHYROIDISM: ICD-10-CM

## 2024-04-25 DIAGNOSIS — N18.4 BENIGN HYPERTENSION WITH CKD (CHRONIC KIDNEY DISEASE) STAGE IV (MULTI): ICD-10-CM

## 2024-04-25 PROCEDURE — 99308 SBSQ NF CARE LOW MDM 20: CPT | Performed by: INTERNAL MEDICINE

## 2024-04-25 NOTE — LETTER
Patient: Magi Boyd  : 1945    Encounter Date: 2024    Name: Magi Boyd  : 1945  MRN: [id number]  Visit Date: 2024  Chief Complaint: Weekly SNF Physician Visit    HPI: 80 y/o, Full Code, worsening right heel wound despite being on atb's. She was started on IV atb for suspected OM. MRI showed plantar heel soft tissue ulceration with subjacent cellulitis and mild reactive osteitis along the plantar cortex of posterior calcaneal tuberosity with no definite MRI evidence of osteomyelitis. ID was consulted and recommended treatment with cefazolin for right heel non-healing wound / possible cellulitis. Podiatry debrided wound . Once stabilized, pt was brought to [facility] on 2024 for ongoing med mgt and therapy services.     Subjective: Seen and examined today. Sitting up in bed. Denies N/V/D/C/CP. Reports that therapy is going ok. Asking for tylenol PRN for headaches. I am ok with this. Nursing reports no issues.     The patient was counseled regarding diagnostic results, instructions for management, risk factor reductions, prognosis, patient and family education, impressions, risks and benefits of treatment options and importance of compliance with treatment. I have reviewed nursing notes since my last visit and document any significant changes Reviewed orders, medications, Labs. Reviewed chart looking at current medications, treatments, labs, x-rays etc.     ROS:  As above in subjective. Otherwise, all other systems have been reviewed and are negative for complaint.    Physical Exam  Vitals reviewed.   Constitutional:       General: She is not in acute distress.     Appearance: Normal appearance. She is not ill-appearing.   HENT:      Head: Normocephalic and atraumatic.      Right Ear: Tympanic membrane and external ear normal.      Left Ear: Tympanic membrane and external ear normal.      Nose: Nose normal.      Mouth/Throat:      Mouth: Mucous membranes are moist.      Pharynx:  Oropharynx is clear.   Eyes:      Conjunctiva/sclera: Conjunctivae normal.      Pupils: Pupils are equal, round, and reactive to light.   Cardiovascular:      Rate and Rhythm: Normal rate and regular rhythm.      Heart sounds: Normal heart sounds. No murmur heard.  Pulmonary:      Effort: Pulmonary effort is normal. No respiratory distress.      Breath sounds: Decreased breath sounds present. No wheezing.   Abdominal:      General: There is no distension.      Palpations: Abdomen is soft. There is no mass.      Tenderness: There is no abdominal tenderness.   Musculoskeletal:         General: Normal range of motion.      Cervical back: Normal range of motion and neck supple.   Skin:     General: Skin is warm and dry.      Comments: venous stasis changes to BLE, chronic lymphedema, DSG to R heel D&I (unstageable pressure ulcer), intertrigo of bilateral breast folds    Neurological:      General: No focal deficit present.      Mental Status: She is alert and oriented to person, place, and time.      Sensory: No sensory deficit.      Motor: No weakness.      Coordination: Coordination normal.      Gait: Gait normal.   Psychiatric:         Mood and Affect: Mood normal.         Behavior: Behavior normal.       Lab Results   Component Value Date    WBC 5.0 04/25/2024    HGB 8.9 (L) 04/25/2024    HCT 27.4 (L) 04/25/2024     (L) 04/25/2024    CHOL 167 11/03/2022    TRIG 174 (H) 11/03/2022    HDL 36.7 (A) 11/03/2022    ALT 5 (L) 04/08/2024    AST 31 04/08/2024     04/25/2024    K 4.2 04/25/2024     04/25/2024    CREATININE 2.47 (H) 04/25/2024    BUN 33 (H) 04/25/2024    CO2 24 04/25/2024    TSH 3.29 11/04/2023    INR 1.1 04/13/2023     Results were reviewed and addressed accordingly. Lab Results were also reviewed in [facility].     Provider Impression:   Non healing right heel wound with cellulitis s/p debridement on 4/6/2024  - completed course of cefazolin  - c/w wound care  - no leukocytosis  - PRN pain  medication  - FU with DPM as scheduled    Recent Left Thoracic Shingles  - no open sores, completed tx, isolation no necessary    IFM, Weakness  - cw PT OT eval and tx    Intertrigo of bilateral breasts  - nystatin    Iron Deficiency  - c/w ferrous gluconate  - monitor H&H    Hypothyroidism  - c/w synthroid    CKD, stage 4  - monitor kidney function qwk and PRN  - renally dose medication where able    H/o COPD  - c/w symbicort    HLD  - c/w statin    H/o Gout  - c/w uloric    HTN  - c/w atenolol    Code Status  - Full Code    ----------------  Written by Ilene Sorensen RN, acting as a scribe for Dr. Cardoso. This note accurately reflects the work and decisions made by Dr. Cardoso.     I, Dr. Cardoso, attest all medical record entries made by the scribe were under my direction and were personally dictated by me. I have reviewed the chart and agree that the record accurately reflects my performance of the history, physical exam, and assessment and plan.       Electronically Signed By: Thi Guerrier MD   5/10/24 11:22 AM

## 2024-04-25 NOTE — PROGRESS NOTES
"Chief Complaint:   SNF F/U  R heel wound infection  JAKI on CKD  Iatrogenic orthostatic hypotension  Physical deconditioning/generalized weakness    HPI:   79 year-old female presenting to the ER on 4/3/24 with c/o worsening R heel wound x 2-3 days despite PO antibiotics. She was advised by her podiatry to come to the ER for evaluation. Work-up in ER: XR of R foot showed no osteomyelitis but was non-diagnostic, MRI showed plantar heel soft tissue ulceration with subjacent cellulitis and mild reactive osteitis along the plantar cortex of posterior calcaneal tuberosity with no definite MRI evidence of osteomyelitis. She was started on IV ATB and admitted to the hospital for further evaluation and treatment. Hospital course:    R heel wound/cellulitis-podiatry consult, ID consult, IV ATB, wound debrided by podiatry on 4/6, ID recommended treatment with Cefazolin for R heel non-healing wound with possible cellulitis   Healing L thoracic shingles-placed on contact precautions, no open sores noted   Physical deconditioning-PT/OT evaluations, recommending SNF  Hypotension/bradycardia-atenolol and oxybutynin held, furosemide changes to prn for weight gain > 2 kg, BP and HR monitored    Pt. was HDS and discharged to [facility] on 4/13/24. Today, patient reports that she is feeling well. She reports improvement in BLE lymphedema and R heel pain. She reports episodes of \"lightheadedness\" at times. She denies any dizziness or vision changes. She reports episodes of R shoulder pain, worse with movement and improved with rest, states that heat does help with the pain. She denies any SOB, cough, chest pain or palpitations, fevers, chills, or appetite changes. Staff report no clinical concerns.     ROS:    As above in HPI. Otherwise, all other systems have been reviewed and are negative for complaint.    Medications reviewed and verified in NH chart.     Patient Active Problem List   Diagnosis    Asthma ([facility])    Benign " hypertension with CKD (chronic kidney disease) stage IV (Multi)    Bilateral leg and foot pain    COPD (chronic obstructive pulmonary disease) (Multi)    Stage 4 chronic kidney disease (Multi)    PAD (peripheral artery disease) (CMS-HCC)    Allergic rhinitis    Arthritis    Chronic sinusitis    Hyperlipidemia    Acquired hypothyroidism    OAB (overactive bladder)    Gout    Venous insufficiency    Lymphedema    Closed fracture of phalanx of toe of right foot    Skin ulcer of right heel (Multi)        Past Medical History:   Diagnosis Date    Acute sinus infection     Acute UTI 04/20/2023    Adverse reaction to NSAIDs, sequela 04/20/2023    Disorder of both eustachian tubes 04/20/2023    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    GERD (gastroesophageal reflux disease) 04/20/2023    Lower urinary tract symptoms 04/20/2023    Nasal congestion     Otalgia, left ear 02/18/2020    Earache, left    Other abnormal glucose 09/29/2016    Abnormal glucose    Other specified disorders of ear, unspecified ear 03/18/2015    Fullness in ear    Pelvic and perineal pain 10/24/2019    Pelvic pressure in female    Postnasal drip     Right knee pain 04/20/2023    Simple chronic bronchitis (Multi) 08/23/2023    SOB (shortness of breath) 08/23/2023    Toe pain 08/23/2023    URI (upper respiratory infection)        No past surgical history on file.    Family History   Problem Relation Name Age of Onset    Heart disease Mother      Brain cancer Father      Lung cancer Father      Heart disease Brother      Heart disease Brother      Heart disease Brother      Heart disease Brother      Diabetes Other FAM HX        Social History     Tobacco Use   Smoking Status Never   Smokeless Tobacco Never       Social History     Substance and Sexual Activity   Alcohol Use Not Currently    Comment: social alcohol use       Social History     Substance and Sexual Activity   Drug Use Never       Allergies   Allergen Reactions    Levofloxacin Other     Linezolid Other     Blisters in mouth     Nitrofurantoin Monohyd/M-Cryst Other    Penicillins Swelling and Other        Vital Signs:   116/68-78-18-97.2-98% on RA     Physical Exam:  General: Sitting up in WC in NAD, alert   Head/Face: NCAT, symmetrical  Eyes: PERRLA, no injection, no discharge  ENT: Hearing not impaired, ears without scars or lesions, nasal mucosa and turbinates pink, septum midline, lips pink and moist  Neck: Supple, symmetrical  Respiratory: CTA but diminished without adventitious sounds, respirations even and nonlabored without use of accessory muscles, good air exchange  Cardio: RRR without murmur or gallops, normal S1S2, chronic lymphedema of BLE (patient reports overall improvement since hospitalization), pedal pulses 2+/4 bilaterally  Chest/Breast: Symmetrical  GI: BS x 4, normoactive, non-distended, abd round and soft, no masses or tenderness  : No suprapubic tenderness or distention  MSK: Gait not assessed, joints with full ROM without pain or contractures, walking boot to RLE  Skin: Skin warm and dry, no induration, venous stasis changes to BLE, DSG to R heel D&I (unstageable pressure ulcer), intertrigo of bilateral breast folds   Neurologic: Cranial nerves II through XII intact, superficial touch and pain sensation intact  Psychiatric: Alert to person, place, and time, calm and cooperative     Results/Data:    Latest Reference Range & Units 04/13/24 07:27   GLUCOSE 74 - 99 mg/dL 82   SODIUM 136 - 145 mmol/L 138   POTASSIUM 3.5 - 5.3 mmol/L 3.8   CHLORIDE 98 - 107 mmol/L 105   Bicarbonate 21 - 32 mmol/L 25   Anion Gap 10 - 20 mmol/L 12   Blood Urea Nitrogen 6 - 23 mg/dL 31 (H)   Creatinine 0.50 - 1.05 mg/dL 3.16 (H)   EGFR >60 mL/min/1.73m*2 15 (L)   Calcium 8.6 - 10.3 mg/dL 9.2   PHOSPHORUS 2.5 - 4.9 mg/dL 3.2   Albumin 3.4 - 5.0 g/dL 2.6 (L)   MAGNESIUM 1.60 - 2.40 mg/dL 1.85   LEUKOCYTES (10*3/UL) IN BLOOD BY AUTOMATED COUNT, [place] 4.4 - 11.3 x10*3/uL 5.8   nRBC 0.0 - 0.0 /100  WBCs 0.0   ERYTHROCYTES (10*6/UL) IN BLOOD BY AUTOMATED COUNT, [place] 4.00 - 5.20 x10*6/uL 3.14 (L)   HEMOGLOBIN 12.0 - 16.0 g/dL 9.7 (L)   HEMATOCRIT 36.0 - 46.0 % 30.7 (L)   MCV 80 - 100 fL 98   MCH 26.0 - 34.0 pg 30.9   MCHC 32.0 - 36.0 g/dL 31.6 (L)   RED CELL DISTRIBUTION WIDTH 11.5 - 14.5 % 13.9   PLATELETS (10*3/UL) IN BLOOD AUTOMATED COUNT, [place] 150 - 450 x10*3/uL 204   (H): Data is abnormally high  (L): Data is abnormally low    Assessment/Plan:  Non-healing wound of R heel with possible cellulitis-s/p debridement on 4/6, completed course of IV Cefazolin, c/w local wound care, offloading, monitor CBC weekly, monitor VS, wound care team following, Tramadol prn for moderate to severe pain, F/U with podiatry after discharge  Recent L thoracic shingles-no open sores noted, does not require isolation at this time  Physical deconditioning/weakness-c/w PT/OT, safety and fall precautions  Hypotension/bradycardia-RESOLVED, continue to monitor BP and HR closely   Lightheadedness-increase fluid intake, change positions slowly, monitor VS  R shoulder pain-likely 2/2 OA, encourage ROM, Tylenol prn, Biofreeze Q 6 hours prn  Intertrigo of breast folds-nystatin powder BID and prn  HTN/HLD/lymphedema-2 gm Na+ diet, c/w atenolol and atorvastatin, c/w lasix prn, monitor BP and HR, monitor weights, F/U with cardiologist after discharge  Anemia of chronic disease-c/w iron supplement, monitor CBC weekly and prn  CKD Stage IV-avoid nephrotoxic medications, renally dose medications as able, monitor BMP weekly and prn, F/U with nephrologist as scheduled  Hypothyroidism-c/w levothyroxine  OAB/Hx UTIs-c/w vesicare and estradiol, F/U with urologist as scheduled  Vitamin D deficiency-c/w supplement  Gout-c/w Uloric, monitor uric acid level  Asthma/COPD-c/w Symbicort, Albuterol prn, monitor respiratory status closely     Orders:  CBC w/ diff and BMP Q Thursday x 6 weeks  Biofreeze Q 6 hours prn for R shoulder pain  Nystatin powder  BID to breast folds    Code Status:   Full Code    Time spent reviewing patient's chart on the unit (PMH, PSH, FH, Social Hx AND progress and/or consult notes, labs, and radiology results): 25 minutes  Time spent interviewing and/or examining the patient: 10 minutes  Time spent writing note on the unit: 5 minutes  Time spent reviewing POC w/ patient, family, and/or staff: 5 minutes  Total visit time: 45 minutes. Greater than 50% of time was spent on counseling and/or coordination of care of the patient. Start time: 11:30 AM, End time: 12:15 PM.    DME Requirements:  Patient will require a hospital bed for discharge home. Pt. has COPD and asthma and requires the HOB to be elevated more than 30 degrees.

## 2024-04-27 LAB
ATRIAL RATE: 55 BPM
P AXIS: 86 DEGREES
P OFFSET: 150 MS
P ONSET: 110 MS
PR INTERVAL: 218 MS
Q ONSET: 219 MS
QRS COUNT: 9 BEATS
QRS DURATION: 102 MS
QT INTERVAL: 482 MS
QTC CALCULATION(BAZETT): 461 MS
QTC FREDERICIA: 468 MS
R AXIS: 20 DEGREES
T AXIS: 48 DEGREES
T OFFSET: 460 MS
VENTRICULAR RATE: 55 BPM

## 2024-04-29 ENCOUNTER — TELEPHONE (OUTPATIENT)
Dept: PRIMARY CARE | Facility: CLINIC | Age: 79
End: 2024-04-29
Payer: MEDICARE

## 2024-04-29 NOTE — PROGRESS NOTES
Name: Magi Boyd  : 1945  MRN: [id number]  Visit Date: 2024  Chief Complaint: HISTORY AND PHYSICAL    HPI: 80 y/o, Full Code, worsening right heel wound despite being on atb's. She was started on IV atb for suspected OM. MRI showed plantar heel soft tissue ulceration with subjacent cellulitis and mild reactive osteitis along the plantar cortex of posterior calcaneal tuberosity with no definite MRI evidence of osteomyelitis. ID was consulted and recommended treatment with cefazolin for right heel non-healing wound / possible cellulitis. Podiatry debrided wound . Once stabilized, pt was brought to [facility] on 2024 for ongoing med mgt and therapy services.     Subjective: Seen and examined today. Sitting up in bed. Reviewed hospitalization with her. Denies N/V/D/C/CP. Reports that therapy is going ok. Right heel wound and rash under breast present.     The patient was counseled regarding diagnostic results, instructions for management, risk factor reductions, prognosis, patient and family education, impressions, risks and benefits of treatment options and importance of compliance with treatment. I have reviewed nursing notes since my last visit and document any significant changes Reviewed orders, medications, Labs. Reviewed chart looking at current medications, treatments, labs, x-rays etc.     ROS:  As above in subjective. Otherwise, all other systems have been reviewed and are negative for complaint.    Medications:  Medications reviewed and verified in NH chart.     Past Medical History:   Diagnosis Date    Acute sinus infection     Acute UTI 2023    Adverse reaction to NSAIDs, sequela 2023    Disorder of both eustachian tubes 2023    Familial hypercholesteremia 2023    GERD (gastroesophageal reflux disease) 2023    Lower urinary tract symptoms 2023    Nasal congestion     Otalgia, left ear 2020    Earache, left    Other abnormal glucose 2016     Abnormal glucose    Other specified disorders of ear, unspecified ear 03/18/2015    Fullness in ear    Pelvic and perineal pain 10/24/2019    Pelvic pressure in female    Postnasal drip     Right knee pain 04/20/2023    Simple chronic bronchitis (Multi) 08/23/2023    SOB (shortness of breath) 08/23/2023    Toe pain 08/23/2023    URI (upper respiratory infection)      No past surgical history on file.    Family History   Problem Relation Name Age of Onset    Heart disease Mother      Brain cancer Father      Lung cancer Father      Heart disease Brother      Heart disease Brother      Heart disease Brother      Heart disease Brother      Diabetes Other FAM HX      Social History     Tobacco Use    Smoking status: Never    Smokeless tobacco: Never   Substance Use Topics    Alcohol use: Not Currently     Comment: social alcohol use     Allergies   Allergen Reactions    Levofloxacin Other    Linezolid Other     Blisters in mouth     Nitrofurantoin Monohyd/M-Cryst Other    Penicillins Swelling and Other      Vital Signs were reviewed in nursing home documentation.    Physical Exam  Vitals reviewed.   Constitutional:       General: She is not in acute distress.     Appearance: Normal appearance. She is not ill-appearing.   HENT:      Head: Normocephalic and atraumatic.      Right Ear: Tympanic membrane and external ear normal.      Left Ear: Tympanic membrane and external ear normal.      Nose: Nose normal.      Mouth/Throat:      Mouth: Mucous membranes are moist.      Pharynx: Oropharynx is clear.   Eyes:      Conjunctiva/sclera: Conjunctivae normal.      Pupils: Pupils are equal, round, and reactive to light.   Cardiovascular:      Rate and Rhythm: Normal rate and regular rhythm.      Heart sounds: Normal heart sounds. No murmur heard.  Pulmonary:      Effort: Pulmonary effort is normal. No respiratory distress.      Breath sounds: Decreased breath sounds present. No wheezing.   Abdominal:      General: There is no  distension.      Palpations: Abdomen is soft. There is no mass.      Tenderness: There is no abdominal tenderness.   Musculoskeletal:         General: Normal range of motion.      Cervical back: Normal range of motion and neck supple.   Skin:     General: Skin is warm and dry.      Comments: venous stasis changes to BLE, chronic lymphedema, DSG to R heel D&I (unstageable pressure ulcer), intertrigo of bilateral breast folds    Neurological:      General: No focal deficit present.      Mental Status: She is alert and oriented to person, place, and time.      Sensory: No sensory deficit.      Motor: No weakness.      Coordination: Coordination normal.      Gait: Gait normal.   Psychiatric:         Mood and Affect: Mood normal.         Behavior: Behavior normal.       Lab Results   Component Value Date    WBC 5.0 04/25/2024    HGB 8.9 (L) 04/25/2024    HCT 27.4 (L) 04/25/2024     (L) 04/25/2024    CHOL 167 11/03/2022    TRIG 174 (H) 11/03/2022    HDL 36.7 (A) 11/03/2022    ALT 5 (L) 04/08/2024    AST 31 04/08/2024     04/25/2024    K 4.2 04/25/2024     04/25/2024    CREATININE 2.47 (H) 04/25/2024    BUN 33 (H) 04/25/2024    CO2 24 04/25/2024    TSH 3.29 11/04/2023    INR 1.1 04/13/2023     Results were reviewed and addressed accordingly. Lab Results were also reviewed in [facility].     Provider Impression:   Non healing right heel wound with cellulitis s/p debridement on 4/6/2024  - completed course of cefazolin  - c/w wound care  - no leukocytosis  - PRN pain medication  - FU with DPM as scheduled    Recent Left Thoracic Shingles  - no open sores, completed tx, isolation no necessary    IFM, Weakness  - cw PT OT eval and tx    Intertrigo of bilateral breasts  - nystatin    Iron Deficiency  - c/w ferrous gluconate  - monitor H&H    Hypothyroidism  - c/w synthroid    CKD, stage 4  - monitor kidney function qwk and PRN  - renally dose medication where able    H/o COPD  - c/w symbicort    HLD  - c/w  statin    H/o Gout  - c/w uloric    HTN  - c/w atenolol    Code Status  - Full Code    ----------------  Written by Ilene Sorensen RN, acting as a scribe for Dr. Cardoso. This note accurately reflects the work and decisions made by Dr. Cardoso.     I, Dr. Cardoso, attest all medical record entries made by the scribe were under my direction and were personally dictated by me. I have reviewed the chart and agree that the record accurately reflects my performance of the history, physical exam, and assessment and plan.

## 2024-05-03 ENCOUNTER — CLINICAL SUPPORT (OUTPATIENT)
Dept: WOUND CARE | Facility: HOSPITAL | Age: 79
End: 2024-05-03
Payer: MEDICARE

## 2024-05-03 PROCEDURE — 11042 DBRDMT SUBQ TIS 1ST 20SQCM/<: CPT

## 2024-05-03 PROCEDURE — 99213 OFFICE O/P EST LOW 20 MIN: CPT | Mod: 25

## 2024-05-09 NOTE — PROGRESS NOTES
Name: Magi Boyd  : 1945  MRN: [id number]  Visit Date: 2024  Chief Complaint: Weekly SNF Physician Visit    HPI: 80 y/o, Full Code, worsening right heel wound despite being on atb's. She was started on IV atb for suspected OM. MRI showed plantar heel soft tissue ulceration with subjacent cellulitis and mild reactive osteitis along the plantar cortex of posterior calcaneal tuberosity with no definite MRI evidence of osteomyelitis. ID was consulted and recommended treatment with cefazolin for right heel non-healing wound / possible cellulitis. Podiatry debrided wound . Once stabilized, pt was brought to [facility] on 2024 for ongoing med mgt and therapy services.     Subjective: Seen and examined today. Sitting up in bed. Denies N/V/D/C/CP. Reports that therapy is going ok. Asking for tylenol PRN for headaches. I am ok with this. Nursing reports no issues.     The patient was counseled regarding diagnostic results, instructions for management, risk factor reductions, prognosis, patient and family education, impressions, risks and benefits of treatment options and importance of compliance with treatment. I have reviewed nursing notes since my last visit and document any significant changes Reviewed orders, medications, Labs. Reviewed chart looking at current medications, treatments, labs, x-rays etc.     ROS:  As above in subjective. Otherwise, all other systems have been reviewed and are negative for complaint.    Physical Exam  Vitals reviewed.   Constitutional:       General: She is not in acute distress.     Appearance: Normal appearance. She is not ill-appearing.   HENT:      Head: Normocephalic and atraumatic.      Right Ear: Tympanic membrane and external ear normal.      Left Ear: Tympanic membrane and external ear normal.      Nose: Nose normal.      Mouth/Throat:      Mouth: Mucous membranes are moist.      Pharynx: Oropharynx is clear.   Eyes:      Conjunctiva/sclera: Conjunctivae normal.       Pupils: Pupils are equal, round, and reactive to light.   Cardiovascular:      Rate and Rhythm: Normal rate and regular rhythm.      Heart sounds: Normal heart sounds. No murmur heard.  Pulmonary:      Effort: Pulmonary effort is normal. No respiratory distress.      Breath sounds: Decreased breath sounds present. No wheezing.   Abdominal:      General: There is no distension.      Palpations: Abdomen is soft. There is no mass.      Tenderness: There is no abdominal tenderness.   Musculoskeletal:         General: Normal range of motion.      Cervical back: Normal range of motion and neck supple.   Skin:     General: Skin is warm and dry.      Comments: venous stasis changes to BLE, chronic lymphedema, DSG to R heel D&I (unstageable pressure ulcer), intertrigo of bilateral breast folds    Neurological:      General: No focal deficit present.      Mental Status: She is alert and oriented to person, place, and time.      Sensory: No sensory deficit.      Motor: No weakness.      Coordination: Coordination normal.      Gait: Gait normal.   Psychiatric:         Mood and Affect: Mood normal.         Behavior: Behavior normal.       Lab Results   Component Value Date    WBC 5.0 04/25/2024    HGB 8.9 (L) 04/25/2024    HCT 27.4 (L) 04/25/2024     (L) 04/25/2024    CHOL 167 11/03/2022    TRIG 174 (H) 11/03/2022    HDL 36.7 (A) 11/03/2022    ALT 5 (L) 04/08/2024    AST 31 04/08/2024     04/25/2024    K 4.2 04/25/2024     04/25/2024    CREATININE 2.47 (H) 04/25/2024    BUN 33 (H) 04/25/2024    CO2 24 04/25/2024    TSH 3.29 11/04/2023    INR 1.1 04/13/2023     Results were reviewed and addressed accordingly. Lab Results were also reviewed in [facility].     Provider Impression:   Non healing right heel wound with cellulitis s/p debridement on 4/6/2024  - completed course of cefazolin  - c/w wound care  - no leukocytosis  - PRN pain medication  - FU with DPM as scheduled    Recent Left Thoracic Shingles  - no  open sores, completed tx, isolation no necessary    IFM, Weakness  - cw PT OT eval and tx    Intertrigo of bilateral breasts  - nystatin    Iron Deficiency  - c/w ferrous gluconate  - monitor H&H    Hypothyroidism  - c/w synthroid    CKD, stage 4  - monitor kidney function qwk and PRN  - renally dose medication where able    H/o COPD  - c/w symbicort    HLD  - c/w statin    H/o Gout  - c/w uloric    HTN  - c/w atenolol    Code Status  - Full Code    ----------------  Written by Ilene Sorensen RN, acting as a scribe for Dr. Cardoso. This note accurately reflects the work and decisions made by Dr. Cardoso.     I, Dr. Cardoso, attest all medical record entries made by the scribe were under my direction and were personally dictated by me. I have reviewed the chart and agree that the record accurately reflects my performance of the history, physical exam, and assessment and plan.

## 2024-05-10 ENCOUNTER — OFFICE VISIT (OUTPATIENT)
Dept: WOUND CARE | Facility: HOSPITAL | Age: 79
End: 2024-05-10
Payer: MEDICARE

## 2024-05-10 PROCEDURE — 11042 DBRDMT SUBQ TIS 1ST 20SQCM/<: CPT

## 2024-05-21 DIAGNOSIS — I10 PRIMARY HYPERTENSION: ICD-10-CM

## 2024-05-21 RX ORDER — ATENOLOL 25 MG/1
25 TABLET ORAL DAILY
Qty: 30 TABLET | Refills: 11 | Status: SHIPPED | OUTPATIENT
Start: 2024-05-21 | End: 2025-05-21

## 2024-05-24 ENCOUNTER — OFFICE VISIT (OUTPATIENT)
Dept: WOUND CARE | Facility: HOSPITAL | Age: 79
End: 2024-05-24
Payer: MEDICARE

## 2024-05-24 PROCEDURE — 11042 DBRDMT SUBQ TIS 1ST 20SQCM/<: CPT

## 2024-05-29 ENCOUNTER — LAB (OUTPATIENT)
Dept: LAB | Facility: LAB | Age: 79
End: 2024-05-29
Payer: MEDICARE

## 2024-05-29 DIAGNOSIS — R60.0 LOCALIZED EDEMA: ICD-10-CM

## 2024-05-29 DIAGNOSIS — I12.9 BENIGN HYPERTENSION WITH CKD (CHRONIC KIDNEY DISEASE) STAGE IV (MULTI): ICD-10-CM

## 2024-05-29 DIAGNOSIS — N18.4 BENIGN HYPERTENSION WITH CKD (CHRONIC KIDNEY DISEASE) STAGE IV (MULTI): ICD-10-CM

## 2024-05-29 LAB
25(OH)D3 SERPL-MCNC: 90 NG/ML (ref 30–100)
ALBUMIN SERPL BCP-MCNC: 3.5 G/DL (ref 3.4–5)
ANION GAP SERPL CALC-SCNC: 13 MMOL/L (ref 10–20)
BUN SERPL-MCNC: 50 MG/DL (ref 6–23)
CALCIUM SERPL-MCNC: 9.8 MG/DL (ref 8.6–10.3)
CHLORIDE SERPL-SCNC: 99 MMOL/L (ref 98–107)
CO2 SERPL-SCNC: 27 MMOL/L (ref 21–32)
CREAT SERPL-MCNC: 3.21 MG/DL (ref 0.5–1.05)
CREAT UR-MCNC: 67.4 MG/DL (ref 20–320)
EGFRCR SERPLBLD CKD-EPI 2021: 14 ML/MIN/1.73M*2
ERYTHROCYTE [DISTWIDTH] IN BLOOD BY AUTOMATED COUNT: 14.3 % (ref 11.5–14.5)
FERRITIN SERPL-MCNC: 168 NG/ML (ref 8–150)
GLUCOSE SERPL-MCNC: 96 MG/DL (ref 74–99)
HCT VFR BLD AUTO: 33.7 % (ref 36–46)
HGB BLD-MCNC: 10.8 G/DL (ref 12–16)
IRON SATN MFR SERPL: 17 % (ref 25–45)
IRON SERPL-MCNC: 48 UG/DL (ref 35–150)
MCH RBC QN AUTO: 32 PG (ref 26–34)
MCHC RBC AUTO-ENTMCNC: 32 G/DL (ref 32–36)
MCV RBC AUTO: 100 FL (ref 80–100)
MICROALBUMIN UR-MCNC: 32.8 MG/L
MICROALBUMIN/CREAT UR: 48.7 UG/MG CREAT
NRBC BLD-RTO: 0 /100 WBCS (ref 0–0)
PHOSPHATE SERPL-MCNC: 3.3 MG/DL (ref 2.5–4.9)
PLATELET # BLD AUTO: 190 X10*3/UL (ref 150–450)
POTASSIUM SERPL-SCNC: 4.2 MMOL/L (ref 3.5–5.3)
PTH-INTACT SERPL-MCNC: 111.8 PG/ML (ref 18.5–88)
RBC # BLD AUTO: 3.38 X10*6/UL (ref 4–5.2)
SODIUM SERPL-SCNC: 135 MMOL/L (ref 136–145)
TIBC SERPL-MCNC: 286 UG/DL (ref 240–445)
UIBC SERPL-MCNC: 238 UG/DL (ref 110–370)
WBC # BLD AUTO: 8.3 X10*3/UL (ref 4.4–11.3)

## 2024-05-29 PROCEDURE — 82728 ASSAY OF FERRITIN: CPT

## 2024-05-29 PROCEDURE — 82043 UR ALBUMIN QUANTITATIVE: CPT

## 2024-05-29 PROCEDURE — 83970 ASSAY OF PARATHORMONE: CPT

## 2024-05-29 PROCEDURE — 82570 ASSAY OF URINE CREATININE: CPT

## 2024-05-29 PROCEDURE — 83550 IRON BINDING TEST: CPT

## 2024-05-29 PROCEDURE — 85027 COMPLETE CBC AUTOMATED: CPT

## 2024-05-29 PROCEDURE — 80069 RENAL FUNCTION PANEL: CPT

## 2024-05-29 PROCEDURE — 82306 VITAMIN D 25 HYDROXY: CPT

## 2024-05-29 PROCEDURE — 83540 ASSAY OF IRON: CPT

## 2024-05-29 PROCEDURE — 36415 COLL VENOUS BLD VENIPUNCTURE: CPT

## 2024-05-31 ENCOUNTER — OFFICE VISIT (OUTPATIENT)
Dept: WOUND CARE | Facility: HOSPITAL | Age: 79
End: 2024-05-31
Payer: MEDICARE

## 2024-05-31 PROCEDURE — 11042 DBRDMT SUBQ TIS 1ST 20SQCM/<: CPT

## 2024-06-04 ENCOUNTER — OFFICE VISIT (OUTPATIENT)
Dept: NEPHROLOGY | Facility: CLINIC | Age: 79
End: 2024-06-04
Payer: MEDICARE

## 2024-06-04 VITALS
DIASTOLIC BLOOD PRESSURE: 72 MMHG | RESPIRATION RATE: 16 BRPM | SYSTOLIC BLOOD PRESSURE: 111 MMHG | OXYGEN SATURATION: 96 % | WEIGHT: 216.6 LBS | HEIGHT: 64 IN | HEART RATE: 74 BPM | BODY MASS INDEX: 36.98 KG/M2 | TEMPERATURE: 98 F

## 2024-06-04 DIAGNOSIS — I12.9 BENIGN HYPERTENSION WITH CKD (CHRONIC KIDNEY DISEASE) STAGE IV (MULTI): ICD-10-CM

## 2024-06-04 DIAGNOSIS — N32.81 OVERACTIVE BLADDER: ICD-10-CM

## 2024-06-04 DIAGNOSIS — M10.00 IDIOPATHIC GOUT, UNSPECIFIED CHRONICITY, UNSPECIFIED SITE: ICD-10-CM

## 2024-06-04 DIAGNOSIS — R60.0 LOCALIZED EDEMA: ICD-10-CM

## 2024-06-04 DIAGNOSIS — N18.5 CKD (CHRONIC KIDNEY DISEASE), STAGE V (MULTI): Primary | ICD-10-CM

## 2024-06-04 DIAGNOSIS — N18.4 BENIGN HYPERTENSION WITH CKD (CHRONIC KIDNEY DISEASE) STAGE IV (MULTI): ICD-10-CM

## 2024-06-04 PROCEDURE — 1159F MED LIST DOCD IN RCRD: CPT | Performed by: INTERNAL MEDICINE

## 2024-06-04 PROCEDURE — 3074F SYST BP LT 130 MM HG: CPT | Performed by: INTERNAL MEDICINE

## 2024-06-04 PROCEDURE — 1123F ACP DISCUSS/DSCN MKR DOCD: CPT | Performed by: INTERNAL MEDICINE

## 2024-06-04 PROCEDURE — 3078F DIAST BP <80 MM HG: CPT | Performed by: INTERNAL MEDICINE

## 2024-06-04 PROCEDURE — 99215 OFFICE O/P EST HI 40 MIN: CPT | Performed by: INTERNAL MEDICINE

## 2024-06-04 RX ORDER — TROSPIUM CHLORIDE 20 MG/1
60 TABLET, FILM COATED ORAL 2 TIMES DAILY
COMMUNITY

## 2024-06-04 NOTE — PATIENT INSTRUCTIONS
Dear CHANDNI   It was nice seeing you in the nephrology clinic today     Today we discussed the following:     #Chronic kidney disease stage 4 now progressed to 5, 15-20% now down to 14%.  No need to start dialysis yet.  Will continue to monitor closely  #Hypertension: Blood pressure is in target at home-continue same atenolol 25 mg, Lasix 40 mg  #Uric acid is improved a lot-continue febuxostat once daily  #Leg swelling and improper wound healing-continue Lasix 40 mg daily.   #If you need dialysis we will get graft inserted-will let the vascular surgeon know when we needed  #Urine frequency and recent incontinence-continue to follow with urology  #Iron deficiency anemia-improved significantly-continue oral iron        I will see you again in 2-3 months with another set of blood work prior to visit     Please call our office if you have any question  Thank you for coming to see me today     Laura Narayan MD, MS, TATYANA ROSARIO  Clinical  - [employer]  Nephrologist - [facility] - [facility]

## 2024-06-04 NOTE — PROGRESS NOTES
Subjective     Ms. Boyd is a 79-year-old white female with past medical history of hypertension, hyperlipidemia, knee osteoarthritis, hypothyroidism, overactive bladder, asthma, remote history of GI bleed, heavy NSAID use, asthma and CKD who is coming today for advanced CKD follow-up.    Last office visit February 2024.  Patient came today with her .  In the interim, she had hospital admission in April 2024 with CHF, wound infection treated with cefazolin and she developed acute kidney injury top of chronic kidney disease.  Today, patient is awake and alert.  Not uremic or significant hypervolemic on exam.  She continues to be on Lasix 40 mg.  Leg swelling is stable and actually improved from prior.  She continues to produce urine.  She continues to have frequency of urination.  She follows with urology.  Blood pressures accepted.  Home blood pressure log was reviewed and showed 110-130/70-80 and target.  No hyperkalemia in most recent blood work.  No significant acidosis.  Overall she is compensated with no immediate need to start dialysis.  I went over uremic/hypervolemic symptoms with wife and  today that might mandate initiation of dialysis      Prior notes    Last office visit November 2023.  Patient came today with her .  Continues to have significant leg swelling.  Reports slowing down her urine output was urine frequency at night.  She continues to be on Lasix 20 mg.  Blood pressures accepted at home with average reading 130/70.  No significant weight gain.  Kidney functions remain to be stable with GFR 19 and creatinine 2.3 improved from prior 3.  She was instructed to see vascular surgery for unhealed wound on her leg-per PCP.  She continues to feel tired and fatigued.  They both have been battling recent flu    Prior notes    Last visit July 2023. Here today with her . Since then, pt states she had a mechanical fall 3 months ago and fractured her R foot. 2 months ago, she  has seen a urologist and was prescribed estradiol, abx and trospium. She had a cystoscopy and said it “looked good”. Has continued urinary frequency and incontinence, going every few hours with “slow flow”. Will see urology 1/2024.   Pt has felt headaches recently, along with feeling more tired and fatigued. States she is not sleeping well. Denies recent confusion.   BP this visit 160s/100s, does not check at home. Has continued swelling to legs and takes Lasix 20mg po daily. Continues with PO iron, discussed improved anemia.   Discussed again with pt how she is not a good candidate for fistula, plan for graft for HD. Will have close follow ups.       Per prior notes     Last office visit April 2023. In interim, she got admitted with leg cellulitis started antibiotic. She has been suffering from recurrent UTI and lower urinary tract symptoms with new incontinence that is hard to treat. She finished 2-3 courses of antibiotic already. She was instructed to start the d-mannose per PCP. She is very frustrated because of the and urinary symptoms and she requested urology referral. Kidney function is stable. Not uremic or significantly hypervolemic on exam. Continues to have significant leg swelling which looks like lymphedema. She stopped taking oral iron as she was taking many pills at that time and she decided to quit. Discussed blood work results that showed iron deficiency anemia-she is willing to start p.o. iron. Offered starting infusion as needed. No significant albuminuria. Vitamin D is normal. Uric acid is normal-refilled febuxostat today     Her prior notes     Last office visit November 2022. In the interim she continues to have leg swelling (unilateral) concerning for DVT or cellulitis. We discussed our concerns with her today. We contacted PCP to update them. Most recent renal function stable at this time, with a serum creatinine 2.4, GFR 20 and BUN 29. She has completed an initial evaluation for AV fistula  and decision for insertion of graft once the need for HD arrives. She states LE edema gets worse with food. We discussed the importance of a balanced diet, low in Na ( avoid hot dogs, noonan...) She agrees. She has lower urinary tract symptoms for which we will rule out UTI today. Although she is in good spirits, she expressed her decision not to pursue renal transplantation. Otherwise no new complaints.         Per prior notes  Last office visit April 2022. Patient came with her  today and is doing well. Was evaluated for a HD fistula but due to vasculature will need graft if/when dialysis is needed. Was sick last with with flu like symptoms but did not have a fever, was not tested for Covid-19. BP at home is in the 120s/70s but was running high today at the office. Leg swelling has slightly improved from prior. Patient is going to hold off on knee surgery.      Per prior notes     Last office visit January 2022. Today, Magi came with her . She feels in her baseline state of health. She lost her son last month for Audemat and she had to drive to [place]. She limited her fluid intake during the road trip. Next day she noticed decreased urine output. Urine output improved later and now in her baseline. She continues to follow low-salt diet. She did not need the dietitian as she supposed to. She is still deciding on the knee surgery. She did blood work 10 days ago and results were discussed with her and her  in detail today including slightly worsening serum creatinine and GFR, within normal potassium, low sodium 132. She reports occasional headache and she takes Tylenol. She has leg swelling. She is adherent to hydrochlorothiazide and rest of medications. Uric acid improved and now normal on febuxostat.  had many questions and all of them were discussed in details and answered. Overall, kidney function is slowly worsening and I anticipate starting dialysis in her lifetime. Blood  pressure is elevated today     Per prior notes     Last office visit October 2021. In the interim, she continues to follow a low-salt diet. She started febuxostat for elevated uric acid with improvement in her knee pain. She did not see a dietitian and she work with one of her friends on healthier diet choices. Weight is stable. She still considering risks and benefits of knee replacement surgery and she is planned to see the orthopedic surgeon in the next few weeks or so. Blood pressure is under good control. No lower urine tract symptoms.  is concerned about limited diet options and not eating well. She agreed to start multivitamins. She reports better taste in the mouth that might be related to febuxostat. Weighing the risks versus benefits of continuing medication. Given her recommendation regarding mouthwash, chewing gums, ice cubes in the mouth etc. Overall she is stable. I reviewed blood work and urinalysis with her in details including stable serum creatinine, GFR, improving albuminuria, normal vitamin D, significant improvement in uric acid after starting Uloric, stable mild anemia     Per prior notes     Last office visit August 2021. We discussed Advil and negative impact on kidneys. Instructed to hold Advil and follow conservative measures. In the interim, she continues to work on healthier diet choices. She does not check blood pressure at home. She could not tolerate allopurinol due to stomach issues. She is adherent to blood pressure medications but she missed the blood pressure medication yesterday. Blood pressure is elevated today. Repeat blood work showed stable poor GFR. Kidney ultrasound with renal atrophy. Magi came today with her . She reports occasional bilateral flank pain and she is concerned if she has UTI. She will get her booster Covid vaccine as well as flu shot soon. No NSAID use at home. She still has bad knee arthritis and she follows with orthopedic surgery. Recently  she started using a walker which is helping her. No urinary tract symptoms, burning or pain with urination only occasional flank pain. No excessive leg swelling or nausea or vomiting. She successfully lost about 10 pounds since last visit        Per prior notes  Magi is coming today with her . He reports knowing about CKD 4 for few years. She recently was undergoing knee replacement therapy and she was asked to get renal clearance due to noticed worsening kidney function. She reports consuming 6 pills of 200 mg Advil daily (total dose of 1200 mg) for the last 5-6 years. Earlier, she had a history of GI bleed due to NSAID use and drop in hemoglobin for which she received a blood transfusion.     She reports making less urine recently. She is not a good water drinker. She does not limit salt intake. Recently she was instructed to lose weight for surgery. She started following [employer] where she gets healthier low-calorie food at home in the last 2 months with appropriate weight loss about 10 pounds. She does not take any herbal supplements or use any chemicals to lose weight. She denies burning, pain or blood in urine. She has noticed that her urine has become thicker recently. No significant leg swelling. Blood pressure is in good control recently. No history of diabetes. She started oxybutynin recently for overactive bladder with significant improvement     Social:  and lives with her . She she used to work as a  in the [employer], no smoking or wine  Family history: 4 brothers with history of CABG, mother  of heart issues and CABG, sister with bladder cancer  at the age of 70, brother  with heart problems. No dialysis history     Review of Systems     Constitutional: +fatigue, no fever,~no chills,~no recent weight gain~and~no recent weight loss.   Eyes: no blurred vision~and~no diplopia.   ENT: no hearing loss,~no earache,~no sore throat,~no swollen glands in the  "neck~and~no nasal discharge.   Cardiovascular: no chest pain,~no palpitations~and~ + lower extremity edema.   Respiratory: no shortness of breath,~no chronic cough~and~no shortness of breath during exertion.   Gastrointestinal: no diarrhea, but~no abdominal pain,~no constipation,~no heartburn,~no vomiting,~no bloody stools~and~no change in bowel movements.   Genitourinary: no dysuria~and~no hematuria.   Musculoskeletal: no arthralgias~and~no myalgias.   Skin: no rashes~and~no skin lesions.   Neurological: + headaches~and~no dizziness.   Psychiatric: no confusion,~no depression~and~no anxiety.   Endocrine: no heat intolerance,~no cold intolerance,~appetite not increased,~no thyroid disorder,~no increased urinary frequency~and~no dry skin.   Hematologic/Lymphatic: does not bleed easily~and~does not bruise easily.   All other systems have been reviewed and are negative for complaint.       Objective   /72 (BP Location: Right arm, Patient Position: Sitting, BP Cuff Size: Large adult)   Pulse 74   Temp 36.7 °C (98 °F)   Resp 16   Ht 1.626 m (5' 4\")   Wt 98.2 kg (216 lb 9.6 oz)   SpO2 96%   BMI 37.18 kg/m²   Wt Readings from Last 3 Encounters:   06/04/24 98.2 kg (216 lb 9.6 oz)   04/03/24 104 kg (229 lb)   03/27/24 104 kg (229 lb)       Physical Exam    General appearance: no distress awake and alert on room air, not significantly hypervolemic on exam  Eyes: non-icteric  HEENT: atrumatic head, PEERLA, moist mucosa  Skin: no apparent rash  Heart: NSR, S1, S2 normal, no murmur or gallop  Lungs: Symmetrical expansion,CTA bilat no wheezing/crackles  Abdomen: soft, nt/nd, obese  Extremities: edema bilat-wrapped bilateral  Neuro: No FND,asterixis, no focal deficits noticed        Data Review        Results from last 7 days   Lab Units 05/29/24  1137   WBC AUTO x10*3/uL 8.3   HEMOGLOBIN g/dL 10.8*   HEMATOCRIT % 33.7*   PLATELETS AUTO x10*3/uL 190        Results from last 7 days   Lab Units 05/29/24  1137   SODIUM " "mmol/L 135*   POTASSIUM mmol/L 4.2   CHLORIDE mmol/L 99   CO2 mmol/L 27   BUN mg/dL 50*   CREATININE mg/dL 3.21*   EGFR mL/min/1.73m*2 14*   GLUCOSE mg/dL 96   CALCIUM mg/dL 9.8   PHOSPHORUS mg/dL 3.3       Lab Results   Component Value Date    URICACID 3.9 11/04/2023       No components found for: \"[id number]\"      No results found for: \"HGBA1C\"      Lab Results   Component Value Date    CALCIUM 9.8 05/29/2024    PHOS 3.3 05/29/2024         No results found for requested labs within last 365 days.        Results from last 7 days   Lab Units 05/29/24  1137   SODIUM mmol/L 135*   POTASSIUM mmol/L 4.2   CHLORIDE mmol/L 99   CO2 mmol/L 27   BUN mg/dL 50*   GLUCOSE mg/dL 96   CALCIUM mg/dL 9.8   ANION GAP mmol/L 13   EGFR mL/min/1.73m*2 14*             Albumin/Creatine Ratio   Date Value Ref Range Status   05/29/2024 48.7 (H) <30.0 ug/mg Creat Final   04/09/2024 27.8 ug/mg Creat Final   02/02/2024 25.3 <30.0 ug/mg Creat Final       Assessment and Plan     Ms. Boyd is a 79-year-old white female with past medical history of hypertension, hyperlipidemia, knee osteoarthritis, hypothyroidism, overactive bladder, asthma, remote history of GI bleed, heavy NSAID use, asthma and CKD who is coming today for advanced CKD follow-up     Impression     # Chronic kidney disease -G4-->5/A1 - worsening Cr   - Baseline SCr 2-2.5, GFR 15-20 mils per minute per 1.73 mÃ‚Â² (new baseline)  - Most recent serum creatinine is worsening 3-3.5, GFR less than 15 ml/min/ 1.73 m in May 2024-new baseline  - Most likely related to atherosclerotic cardiovascular disease and significant history of NSAID use  -Spot test ACR 48 mg/g-worsening from prior (negative)  -Electrolytes: Acceptable potassium, albumin and no significant acidosis.  -Previous kidney U/S August 2021 showed shrunk right kidney 8.5 cm with increased echogenicity, left kidney 10.4 cm. No stones or masses  -Declined kidney transplant evaluation  -Not a good candidate for AV fistula. " Arteriovenous graft as needed per vascular surgery, discussed this with pt.  -No immediate need to start dialysis.  Close follow-up     #Urinary incontinence, new onset increased frequency   - preexisting, currently on trospium per urology.  -Oxybutynin was discontinued during last hospital admission April 2024 due to orthostatic hypotension         #BLE edema, possible lymphedema  -Pt didn't make it to lymphedema clinic last time.  Will continue Lasix 40 mg daily     # Blood Pressure  - Today BP is accepted.  Home blood pressure checks average 130/60- Current meds: Atenolol 25) reduced from 100 due to low blood pressure) mg qd, Furosemide 40 mg qd  - Instructed to check blood pressure at home     #Anemia  - Hgb 10-11, improved  - Iron saturation 28% improved from prior   - prior labs showed iron deficiency anemia  - Pt continues with PO iron BID.      #BMD  - Calcium, phosphorus, albumin within reference values     # CVS-high risk  - On statins     #Uric Acid  - Previously unable to tolerate allopurinol due to GI issues.   - Doing well on febuxostat (started 10/2021), most recent uric acid 3-4 mg/dl  - Continue current regimen     General CKD recommendations:  - Avoid NSAIDS  - Avoid contrast as possible. If needed, hold ACEi/ARB/diuretic 24 hours prior to exposure, ensure appropriate hydration. For inpatient, consider IVF prior to and after  - Diet: limit salt, avoid processed foods  - For nausea/vomiting/ ulcer protection, prefer avoid PPI, H2 blocker, sucralfate okay.  - Tobacco cessation     Follow up 2-3 months with repeat blood work and urine analysis, iron studies, CBC, RFP prior.          Laura Narayan MD, MS, TATYANA ROSARIO  Clinical  - [employer]  Nephrologist - [facility] - [facility]

## 2024-06-13 ENCOUNTER — OFFICE VISIT (OUTPATIENT)
Dept: WOUND CARE | Facility: HOSPITAL | Age: 79
End: 2024-06-13
Payer: MEDICARE

## 2024-06-13 PROCEDURE — 11042 DBRDMT SUBQ TIS 1ST 20SQCM/<: CPT

## 2024-06-14 ENCOUNTER — APPOINTMENT (OUTPATIENT)
Dept: WOUND CARE | Facility: HOSPITAL | Age: 79
End: 2024-06-14
Payer: MEDICARE

## 2024-06-14 DIAGNOSIS — R30.0 DYSURIA: Primary | ICD-10-CM

## 2024-06-17 ENCOUNTER — LAB (OUTPATIENT)
Dept: LAB | Facility: LAB | Age: 79
End: 2024-06-17
Payer: MEDICARE

## 2024-06-17 DIAGNOSIS — R30.0 DYSURIA: ICD-10-CM

## 2024-06-17 PROCEDURE — 87086 URINE CULTURE/COLONY COUNT: CPT

## 2024-06-17 PROCEDURE — 87186 SC STD MICRODIL/AGAR DIL: CPT

## 2024-06-18 ENCOUNTER — APPOINTMENT (OUTPATIENT)
Dept: PODIATRY | Facility: CLINIC | Age: 79
End: 2024-06-18
Payer: MEDICARE

## 2024-06-18 DIAGNOSIS — B35.1 ONYCHOMYCOSIS: ICD-10-CM

## 2024-06-18 DIAGNOSIS — L97.411: ICD-10-CM

## 2024-06-18 DIAGNOSIS — M79.671 RIGHT FOOT PAIN: ICD-10-CM

## 2024-06-18 DIAGNOSIS — M79.671 PAIN IN BOTH FEET: Primary | ICD-10-CM

## 2024-06-18 DIAGNOSIS — M79.672 PAIN IN BOTH FEET: Primary | ICD-10-CM

## 2024-06-18 PROCEDURE — 1123F ACP DISCUSS/DSCN MKR DOCD: CPT | Performed by: PODIATRIST

## 2024-06-18 PROCEDURE — 1160F RVW MEDS BY RX/DR IN RCRD: CPT | Performed by: PODIATRIST

## 2024-06-18 PROCEDURE — 1159F MED LIST DOCD IN RCRD: CPT | Performed by: PODIATRIST

## 2024-06-18 PROCEDURE — 99212 OFFICE O/P EST SF 10 MIN: CPT | Performed by: PODIATRIST

## 2024-06-18 PROCEDURE — 1036F TOBACCO NON-USER: CPT | Performed by: PODIATRIST

## 2024-06-18 NOTE — PROGRESS NOTES
History of Present Illness:   This 79 year old female  Presents for follow up pain  Was admitted for pain and heel infection  Dc to care facility  Has since been home for a few weeks  States she is still going to [facility] and having dressing changed  States the pain is greatly improved  Denies trauma  No other pedal complaints      Past Medical History  Past Medical History:   Diagnosis Date    Acute UTI 04/20/2023    Adverse reaction to NSAIDs, sequela 04/20/2023    Benign essential hypertension 04/20/2023    Bilateral leg and foot pain 04/20/2023    Body mass index (BMI)40.0-44.9, adult 10/18/2021    Body mass index (BMI) of 40.0 to 44.9 in adult    CKD (chronic kidney disease) 04/20/2023    Disorder of both eustachian tubes 04/20/2023    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    GERD (gastroesophageal reflux disease) 04/20/2023    Lower urinary tract symptoms 04/20/2023    Obesity, unspecified 08/01/2022    Class 2 obesity with body mass index (BMI) of 39.0 to 39.9 in adult    Otalgia, left ear 02/18/2020    Earache, left    Other abnormal glucose 09/29/2016    Abnormal glucose    Other specified abnormal findings of blood chemistry 10/05/2016    Abnormal thyroid blood test    Other specified disorders of ear, unspecified ear 03/18/2015    Fullness in ear    Pelvic and perineal pain 10/24/2019    Pelvic pressure in female    Personal history of other diseases of the respiratory system 12/05/2013    History of acute bronchitis    Personal history of other diseases of the respiratory system 03/18/2015    History of acute sinusitis    Personal history of other diseases of the respiratory system 12/17/2019    History of upper respiratory infection    Personal history of other drug therapy 10/25/2019    History of influenza vaccination    Personal history of other endocrine, nutritional and metabolic disease     History of dehydration    Personal history of other specified conditions 09/26/2013    History of fatigue     Personal history of other specified conditions 10/29/2013    History of shortness of breath    Personal history of other specified conditions 02/18/2020    History of nasal congestion    Personal history of other specified conditions 02/18/2020    History of postnasal drip    Personal history of other specified conditions 02/09/2015    History of diarrhea    Personal history of urinary (tract) infections 12/18/2018    History of urinary tract infection    Persons encountering health services in other specified circumstances 10/25/2019    Encounter to establish care    Right knee pain 04/20/2023    Simple chronic bronchitis (CMS/HCC) 08/23/2023    SOB (shortness of breath) 08/23/2023    Toe pain 08/23/2023    Unspecified abnormal findings in urine 08/25/2021    Abnormal urinalysis    Urinary tract infection, site not specified 10/18/2021    Acute lower UTI    Urinary tract infection, site not specified 08/30/2022    Acute UTI       Medications and Allergies have been reviewed.    Review Of Systems:  GENERAL: No weight loss, malaise or fevers.  HEENT: Negative for frequent or significant headaches,   RESPIRATORY: Negative for cough, wheezing or shortness of breath.  CARDIOVASCULAR: Negative for chest pain, leg swelling or palpitations.    Physical Exam:  Vascular exam: Dorsalis pedis and Posterior Tibial pulses palpable 2/4 bilateral. Capillary refill time less than 3 seconds b/l. Hair growth noted to digits. +2 b/l LE edema noted. Skin temp warm to warm from proximal to distal b/l. + varicosities noted.     Neurologic exam: Gross sensation present b/l. No neuro deficits noted b/l      Musculoskeletal exam: Muscle strength 5/5 for all major muscle groups tested in the lower extremity b/l. No gross deformities noted b/l. Pain to right plantar and post heel . NO Soi noted. However tender with palpation     Dermatologic exam:  Webspaces 1-4 b/l are clean, dry and intact.  Ulcer noted to right platnar heel. Granular  and  mixed fibrotic tissue base. Improvement noted. Open wound still noted. Nails thick and discolored and elongated    1. Pain in both feet        2. Right foot pain        3. Heel ulcer, right, limited to breakdown of skin (Multi)        4. Onychomycosis              Patient exam and eval  Pleased with progress  Continue with wound care plan  Debrided nails  Fu 3 mos  Sooner if any new prob arise.   Pt and hub in agreement to plan    Kirstie Martino DPM  [phone]  [address]  Fax: [phone]

## 2024-06-19 DIAGNOSIS — N39.0 ACUTE UTI: Primary | ICD-10-CM

## 2024-06-19 LAB — BACTERIA UR CULT: ABNORMAL

## 2024-06-19 RX ORDER — SULFAMETHOXAZOLE AND TRIMETHOPRIM 800; 160 MG/1; MG/1
1 TABLET ORAL 2 TIMES DAILY
Qty: 20 TABLET | Refills: 0 | Status: SHIPPED | OUTPATIENT
Start: 2024-06-19 | End: 2024-06-29

## 2024-06-19 NOTE — PROGRESS NOTES
HISTORY OF PRESENT ILLNESS:  Magi Boyd is a 79 y.o. female who presents today for a follow-up visit. The patient was previously seen with recurrent UTI and OAB in the setting of stage IV kidney disease. The patient has had one UTI since 09/2023. The patient may require  dialysis but she has still been experiencing urinary incontinence.          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    Surgical History  She has no past surgical history on file.     Social History  She reports that she has never smoked. She has never used smokeless tobacco. She reports that she does not currently use alcohol. She reports that she does not use drugs.    Family History  Family History   Problem Relation Name Age of Onset    Heart disease Mother      Brain cancer Father      Lung cancer Father      Heart disease Brother      Heart disease Brother      Heart disease Brother      Heart disease Brother      Diabetes Other FAM HX         Allergies  Levofloxacin, Linezolid, Nitrofurantoin monohyd/m-cryst, and Penicillins      A comprehensive 10+ review of systems was negative except for: see hpi                          PHYSICAL EXAMINATION:  BP Readings from Last 3 Encounters:   06/04/24 111/72   04/13/24 112/57   03/27/24 131/69      Wt Readings from Last 3  "Encounters:   06/04/24 98.2 kg (216 lb 9.6 oz)   04/03/24 104 kg (229 lb)   03/27/24 104 kg (229 lb)      BMI: Estimated body mass index is 37.18 kg/m² as calculated from the following:    Height as of 6/4/24: 1.626 m (5' 4\").    Weight as of 6/4/24: 98.2 kg (216 lb 9.6 oz).  BSA: Estimated body surface area is 2.11 meters squared as calculated from the following:    Height as of 6/4/24: 1.626 m (5' 4\").    Weight as of 6/4/24: 98.2 kg (216 lb 9.6 oz).  HEENT: Normocephalic, atraumatic, PER EOMI, nonicteric, trachea normal, thyroid normal, oropharynx normal.  CARDIAC: regular rate & rhythm, S1 & S2 normal.  No heaves, thrills, gallops or murmurs.  LUNGS: Clear to auscultation, no spinal or CV tenderness.  EXTREMITIES: No evidence of cyanosis, clubbing or edema.      Assessment:  78-year-old with recurrent UTIs and overactive bladder in the setting of stage IV kidney disease     Recurrent UTI :  Continue estradiol cream and she has completed fosfomycin  Standing culture ordered   cystoscopy was negative on 10/19/2020     OAB:  Failed oxybutynin and trospium, no improvement with vesicare  May need dialysis  Wants to see how she does with this   Can consider botox     All questions and concerns were answered and addressed.  The patient expressed understanding and agrees with the plan.     Arsen Allan MD    Scribe Attestation  By signing my name below, I, Roya Altamirano   attest that this documentation has been prepared under the direction and in the presence of Arsen Allan MD.      "

## 2024-06-20 ENCOUNTER — APPOINTMENT (OUTPATIENT)
Dept: UROLOGY | Facility: CLINIC | Age: 79
End: 2024-06-20
Payer: MEDICARE

## 2024-06-20 DIAGNOSIS — N39.0 RECURRENT UTI: Primary | ICD-10-CM

## 2024-06-20 PROCEDURE — 1123F ACP DISCUSS/DSCN MKR DOCD: CPT | Performed by: STUDENT IN AN ORGANIZED HEALTH CARE EDUCATION/TRAINING PROGRAM

## 2024-06-20 PROCEDURE — 99213 OFFICE O/P EST LOW 20 MIN: CPT | Performed by: STUDENT IN AN ORGANIZED HEALTH CARE EDUCATION/TRAINING PROGRAM

## 2024-06-21 ENCOUNTER — OFFICE VISIT (OUTPATIENT)
Dept: WOUND CARE | Facility: HOSPITAL | Age: 79
End: 2024-06-21
Payer: MEDICARE

## 2024-06-21 PROCEDURE — 11042 DBRDMT SUBQ TIS 1ST 20SQCM/<: CPT

## 2024-06-28 ENCOUNTER — OFFICE VISIT (OUTPATIENT)
Dept: WOUND CARE | Facility: HOSPITAL | Age: 79
End: 2024-06-28
Payer: MEDICARE

## 2024-06-28 PROCEDURE — 11042 DBRDMT SUBQ TIS 1ST 20SQCM/<: CPT

## 2024-07-03 DIAGNOSIS — E03.9 HYPOTHYROIDISM, UNSPECIFIED TYPE: ICD-10-CM

## 2024-07-03 PROCEDURE — G0179 MD RECERTIFICATION HHA PT: HCPCS | Performed by: NURSE PRACTITIONER

## 2024-07-03 RX ORDER — LEVOTHYROXINE SODIUM 75 UG/1
75 TABLET ORAL DAILY
Qty: 90 TABLET | Refills: 1 | OUTPATIENT
Start: 2024-07-03

## 2024-07-11 DIAGNOSIS — E03.9 HYPOTHYROIDISM, UNSPECIFIED TYPE: ICD-10-CM

## 2024-07-11 RX ORDER — LEVOTHYROXINE SODIUM 75 UG/1
75 TABLET ORAL EVERY MORNING
Qty: 90 TABLET | Refills: 3 | Status: SHIPPED | OUTPATIENT
Start: 2024-07-11

## 2024-07-12 ENCOUNTER — OFFICE VISIT (OUTPATIENT)
Dept: WOUND CARE | Facility: HOSPITAL | Age: 79
End: 2024-07-12
Payer: MEDICARE

## 2024-07-12 DIAGNOSIS — L97.412 ULCER OF RIGHT HEEL, WITH FAT LAYER EXPOSED (MULTI): Primary | ICD-10-CM

## 2024-07-12 PROCEDURE — 11042 DBRDMT SUBQ TIS 1ST 20SQCM/<: CPT

## 2024-07-12 PROCEDURE — 87070 CULTURE OTHR SPECIMN AEROBIC: CPT | Mod: GEALAB | Performed by: PODIATRIST

## 2024-07-14 LAB
BACTERIA SPEC CULT: ABNORMAL
BACTERIA SPEC CULT: ABNORMAL
GRAM STN SPEC: ABNORMAL
GRAM STN SPEC: ABNORMAL

## 2024-07-16 DIAGNOSIS — L97.412 ULCER OF RIGHT HEEL, WITH FAT LAYER EXPOSED (MULTI): Primary | ICD-10-CM

## 2024-07-16 RX ORDER — SULFAMETHOXAZOLE AND TRIMETHOPRIM 800; 160 MG/1; MG/1
1 TABLET ORAL 2 TIMES DAILY
Qty: 20 TABLET | Refills: 0 | Status: SHIPPED | OUTPATIENT
Start: 2024-07-16 | End: 2024-07-26

## 2024-07-17 RX ORDER — AZITHROMYCIN 250 MG/1
TABLET, FILM COATED ORAL
Qty: 6 EACH | Refills: 0 | Status: SHIPPED | OUTPATIENT
Start: 2024-07-17 | End: 2024-07-22

## 2024-07-19 ENCOUNTER — OFFICE VISIT (OUTPATIENT)
Dept: WOUND CARE | Facility: HOSPITAL | Age: 79
End: 2024-07-19
Payer: MEDICARE

## 2024-07-19 PROCEDURE — 11042 DBRDMT SUBQ TIS 1ST 20SQCM/<: CPT

## 2024-08-02 ENCOUNTER — OFFICE VISIT (OUTPATIENT)
Dept: WOUND CARE | Facility: HOSPITAL | Age: 79
End: 2024-08-02
Payer: MEDICARE

## 2024-08-02 PROCEDURE — 11042 DBRDMT SUBQ TIS 1ST 20SQCM/<: CPT

## 2024-08-03 DIAGNOSIS — J45.909 UNCOMPLICATED ASTHMA, UNSPECIFIED ASTHMA SEVERITY, UNSPECIFIED WHETHER PERSISTENT (HHS-HCC): ICD-10-CM

## 2024-08-03 DIAGNOSIS — E78.2 MIXED HYPERLIPIDEMIA: ICD-10-CM

## 2024-08-05 RX ORDER — ATORVASTATIN CALCIUM 20 MG/1
20 TABLET, FILM COATED ORAL DAILY
Qty: 90 TABLET | Refills: 0 | OUTPATIENT
Start: 2024-08-05

## 2024-08-07 RX ORDER — BUDESONIDE AND FORMOTEROL FUMARATE DIHYDRATE 160; 4.5 UG/1; UG/1
2 AEROSOL RESPIRATORY (INHALATION) DAILY
Qty: 10.2 G | Refills: 3 | Status: SHIPPED | OUTPATIENT
Start: 2024-08-07

## 2024-08-08 ENCOUNTER — LAB (OUTPATIENT)
Dept: LAB | Facility: LAB | Age: 79
End: 2024-08-08
Payer: MEDICARE

## 2024-08-08 DIAGNOSIS — N18.5 CKD (CHRONIC KIDNEY DISEASE), STAGE V (MULTI): ICD-10-CM

## 2024-08-08 DIAGNOSIS — N32.81 OVERACTIVE BLADDER: ICD-10-CM

## 2024-08-08 DIAGNOSIS — I12.9 BENIGN HYPERTENSION WITH CKD (CHRONIC KIDNEY DISEASE) STAGE IV (MULTI): ICD-10-CM

## 2024-08-08 DIAGNOSIS — R60.0 LOCALIZED EDEMA: ICD-10-CM

## 2024-08-08 DIAGNOSIS — N18.4 BENIGN HYPERTENSION WITH CKD (CHRONIC KIDNEY DISEASE) STAGE IV (MULTI): ICD-10-CM

## 2024-08-08 DIAGNOSIS — M10.00 IDIOPATHIC GOUT, UNSPECIFIED CHRONICITY, UNSPECIFIED SITE: ICD-10-CM

## 2024-08-08 LAB
ALBUMIN SERPL BCP-MCNC: 3.8 G/DL (ref 3.4–5)
ANION GAP SERPL CALC-SCNC: 14 MMOL/L (ref 10–20)
BUN SERPL-MCNC: 36 MG/DL (ref 6–23)
CALCIUM SERPL-MCNC: 9.6 MG/DL (ref 8.6–10.3)
CHLORIDE SERPL-SCNC: 102 MMOL/L (ref 98–107)
CO2 SERPL-SCNC: 27 MMOL/L (ref 21–32)
CREAT SERPL-MCNC: 2.9 MG/DL (ref 0.5–1.05)
EGFRCR SERPLBLD CKD-EPI 2021: 16 ML/MIN/1.73M*2
ERYTHROCYTE [DISTWIDTH] IN BLOOD BY AUTOMATED COUNT: 13.4 % (ref 11.5–14.5)
FERRITIN SERPL-MCNC: 177 NG/ML (ref 8–150)
GLUCOSE SERPL-MCNC: 92 MG/DL (ref 74–99)
HCT VFR BLD AUTO: 32.7 % (ref 36–46)
HGB BLD-MCNC: 10.5 G/DL (ref 12–16)
IRON SATN MFR SERPL: 28 % (ref 25–45)
IRON SERPL-MCNC: 74 UG/DL (ref 35–150)
MCH RBC QN AUTO: 31.7 PG (ref 26–34)
MCHC RBC AUTO-ENTMCNC: 32.1 G/DL (ref 32–36)
MCV RBC AUTO: 99 FL (ref 80–100)
NRBC BLD-RTO: 0 /100 WBCS (ref 0–0)
PHOSPHATE SERPL-MCNC: 3.3 MG/DL (ref 2.5–4.9)
PLATELET # BLD AUTO: 180 X10*3/UL (ref 150–450)
POTASSIUM SERPL-SCNC: 3.8 MMOL/L (ref 3.5–5.3)
RBC # BLD AUTO: 3.31 X10*6/UL (ref 4–5.2)
SODIUM SERPL-SCNC: 139 MMOL/L (ref 136–145)
TIBC SERPL-MCNC: 268 UG/DL (ref 240–445)
UIBC SERPL-MCNC: 194 UG/DL (ref 110–370)
URATE SERPL-MCNC: 4.7 MG/DL (ref 2.3–6.7)
WBC # BLD AUTO: 5.7 X10*3/UL (ref 4.4–11.3)

## 2024-08-08 PROCEDURE — 83540 ASSAY OF IRON: CPT

## 2024-08-08 PROCEDURE — 82306 VITAMIN D 25 HYDROXY: CPT

## 2024-08-08 PROCEDURE — 82043 UR ALBUMIN QUANTITATIVE: CPT

## 2024-08-08 PROCEDURE — 84550 ASSAY OF BLOOD/URIC ACID: CPT

## 2024-08-08 PROCEDURE — 85027 COMPLETE CBC AUTOMATED: CPT

## 2024-08-08 PROCEDURE — 83970 ASSAY OF PARATHORMONE: CPT

## 2024-08-08 PROCEDURE — 83550 IRON BINDING TEST: CPT

## 2024-08-08 PROCEDURE — 82570 ASSAY OF URINE CREATININE: CPT

## 2024-08-08 PROCEDURE — 36415 COLL VENOUS BLD VENIPUNCTURE: CPT

## 2024-08-08 PROCEDURE — 80069 RENAL FUNCTION PANEL: CPT

## 2024-08-08 PROCEDURE — 82728 ASSAY OF FERRITIN: CPT

## 2024-08-09 LAB
25(OH)D3 SERPL-MCNC: 110 NG/ML (ref 30–100)
CREAT UR-MCNC: 54.8 MG/DL (ref 20–320)
MICROALBUMIN UR-MCNC: <7 MG/L
MICROALBUMIN/CREAT UR: NORMAL MG/G{CREAT}
PTH-INTACT SERPL-MCNC: 108.2 PG/ML (ref 18.5–88)

## 2024-08-15 ENCOUNTER — APPOINTMENT (OUTPATIENT)
Dept: NEPHROLOGY | Facility: CLINIC | Age: 79
End: 2024-08-15
Payer: MEDICARE

## 2024-08-15 VITALS
HEIGHT: 64 IN | HEART RATE: 89 BPM | WEIGHT: 209 LBS | DIASTOLIC BLOOD PRESSURE: 82 MMHG | BODY MASS INDEX: 35.68 KG/M2 | SYSTOLIC BLOOD PRESSURE: 138 MMHG | RESPIRATION RATE: 16 BRPM | OXYGEN SATURATION: 95 % | TEMPERATURE: 97.5 F

## 2024-08-15 DIAGNOSIS — I12.9 BENIGN HYPERTENSION WITH CKD (CHRONIC KIDNEY DISEASE) STAGE IV (MULTI): ICD-10-CM

## 2024-08-15 DIAGNOSIS — I89.0 LYMPHEDEMA: ICD-10-CM

## 2024-08-15 DIAGNOSIS — N18.5 CKD (CHRONIC KIDNEY DISEASE), STAGE V (MULTI): ICD-10-CM

## 2024-08-15 DIAGNOSIS — N18.4 STAGE 4 CHRONIC KIDNEY DISEASE (MULTI): Primary | ICD-10-CM

## 2024-08-15 DIAGNOSIS — M10.00 IDIOPATHIC GOUT, UNSPECIFIED CHRONICITY, UNSPECIFIED SITE: ICD-10-CM

## 2024-08-15 DIAGNOSIS — R60.0 LOCALIZED EDEMA: ICD-10-CM

## 2024-08-15 DIAGNOSIS — N18.4 BENIGN HYPERTENSION WITH CKD (CHRONIC KIDNEY DISEASE) STAGE IV (MULTI): ICD-10-CM

## 2024-08-15 DIAGNOSIS — N32.81 OAB (OVERACTIVE BLADDER): ICD-10-CM

## 2024-08-15 PROCEDURE — 3075F SYST BP GE 130 - 139MM HG: CPT | Performed by: INTERNAL MEDICINE

## 2024-08-15 PROCEDURE — 3079F DIAST BP 80-89 MM HG: CPT | Performed by: INTERNAL MEDICINE

## 2024-08-15 PROCEDURE — 1123F ACP DISCUSS/DSCN MKR DOCD: CPT | Performed by: INTERNAL MEDICINE

## 2024-08-15 PROCEDURE — 99215 OFFICE O/P EST HI 40 MIN: CPT | Performed by: INTERNAL MEDICINE

## 2024-08-15 PROCEDURE — 1159F MED LIST DOCD IN RCRD: CPT | Performed by: INTERNAL MEDICINE

## 2024-08-15 RX ORDER — FUROSEMIDE 40 MG/1
40 TABLET ORAL DAILY
Qty: 90 TABLET | Refills: 3 | Status: SHIPPED | OUTPATIENT
Start: 2024-08-15 | End: 2025-08-15

## 2024-08-15 NOTE — PATIENT INSTRUCTIONS
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  Kidney function however around 15% currently at 16%.  No need to start dialysis yet.  Will continue to monitor closely  #Hypertension: Blood pressure is in target at home-continue  Lasix 40 mg-refilled today.  Continues on hold due to low blood pressure.  Continue to hold atenolol  #Uric acid is improved a lot-continue febuxostat once daily  #Leg swelling and improper wound healing-continue Lasix 40 mg daily.   #If you need dialysis we will get graft inserted-will let the vascular surgeon know when we needed  #Urine frequency and recent incontinence-continue to follow with urology  #Iron deficiency anemia-improved significantly-continue oral iron  # Elevated vitamin D-stop weekly vitamin D        I will see you again in 2-3 months with another set of blood work prior to visit     Please call our office if you have any question  Thank you for coming to see me today     Laura Narayan MD, MS, TATYANA ROSARIO  Clinical  - [facility] [employer]  Nephrologist - [facility] - [facility]

## 2024-08-15 NOTE — PROGRESS NOTES
Subjective     Ms. Boyd is a 79-year-old white female with past medical history of hypertension, hyperlipidemia, knee osteoarthritis, hypothyroidism, overactive bladder, asthma, remote history of GI bleed, heavy NSAID use, asthma and CKD who is coming today for advanced CKD follow-up.    Magi comes in today with her . GFR and Scr improved over the past couple months. Informs us that her BP decreased last month since she lost 50 pounds in the last year. Atenolol was discontinued. Needs refill for Lasix today. Denies any blurry vision, headache, confusion. Diagnosed with UTI in June, treated with Bactrim. No new urinary symptoms.       June 2024:  Last office visit February 2024.  Patient came today with her .  In the interim, she had hospital admission in April 2024 with CHF, wound infection treated with cefazolin and she developed acute kidney injury top of chronic kidney disease.  Today, patient is awake and alert.  Not uremic or significant hypervolemic on exam.  She continues to be on Lasix 40 mg.  Leg swelling is stable and actually improved from prior.  She continues to produce urine.  She continues to have frequency of urination.  She follows with urology.  Blood pressures accepted.  Home blood pressure log was reviewed and showed 110-130/70-80 and target.  No hyperkalemia in most recent blood work.  No significant acidosis.  Overall she is compensated with no immediate need to start dialysis.  I went over uremic/hypervolemic symptoms with wife and  today that might mandate initiation of dialysis      Prior notes    Last office visit November 2023.  Patient came today with her .  Continues to have significant leg swelling.  Reports slowing down her urine output was urine frequency at night.  She continues to be on Lasix 20 mg.  Blood pressures accepted at home with average reading 130/70.  No significant weight gain.  Kidney functions remain to be stable with GFR 19 and  creatinine 2.3 improved from prior 3.  She was instructed to see vascular surgery for unhealed wound on her leg-per PCP.  She continues to feel tired and fatigued.  They both have been battling recent flu    Prior notes    Last visit July 2023. Here today with her . Since then, pt states she had a mechanical fall 3 months ago and fractured her R foot. 2 months ago, she has seen a urologist and was prescribed estradiol, abx and trospium. She had a cystoscopy and said it “looked good”. Has continued urinary frequency and incontinence, going every few hours with “slow flow”. Will see urology 1/2024.   Pt has felt headaches recently, along with feeling more tired and fatigued. States she is not sleeping well. Denies recent confusion.   BP this visit 160s/100s, does not check at home. Has continued swelling to legs and takes Lasix 20mg po daily. Continues with PO iron, discussed improved anemia.   Discussed again with pt how she is not a good candidate for fistula, plan for graft for HD. Will have close follow ups.       Per prior notes     Last office visit April 2023. In interim, she got admitted with leg cellulitis started antibiotic. She has been suffering from recurrent UTI and lower urinary tract symptoms with new incontinence that is hard to treat. She finished 2-3 courses of antibiotic already. She was instructed to start the d-mannose per PCP. She is very frustrated because of the and urinary symptoms and she requested urology referral. Kidney function is stable. Not uremic or significantly hypervolemic on exam. Continues to have significant leg swelling which looks like lymphedema. She stopped taking oral iron as she was taking many pills at that time and she decided to quit. Discussed blood work results that showed iron deficiency anemia-she is willing to start p.o. iron. Offered starting infusion as needed. No significant albuminuria. Vitamin D is normal. Uric acid is normal-refilled febuxostat today      Her prior notes     Last office visit November 2022. In the interim she continues to have leg swelling (unilateral) concerning for DVT or cellulitis. We discussed our concerns with her today. We contacted PCP to update them. Most recent renal function stable at this time, with a serum creatinine 2.4, GFR 20 and BUN 29. She has completed an initial evaluation for AV fistula and decision for insertion of graft once the need for HD arrives. She states LE edema gets worse with food. We discussed the importance of a balanced diet, low in Na ( avoid hot dogs, noonan...) She agrees. She has lower urinary tract symptoms for which we will rule out UTI today. Although she is in good spirits, she expressed her decision not to pursue renal transplantation. Otherwise no new complaints.         Per prior notes  Last office visit April 2022. Patient came with her  today and is doing well. Was evaluated for a HD fistula but due to vasculature will need graft if/when dialysis is needed. Was sick last with with flu like symptoms but did not have a fever, was not tested for Covid-19. BP at home is in the 120s/70s but was running high today at the office. Leg swelling has slightly improved from prior. Patient is going to hold off on knee surgery.      Per prior notes     Last office visit January 2022. Today, Magi came with her . She feels in her baseline state of health. She lost her son last month for [employer] and she had to drive to [place]. She limited her fluid intake during the road trip. Next day she noticed decreased urine output. Urine output improved later and now in her baseline. She continues to follow low-salt diet. She did not need the dietitian as she supposed to. She is still deciding on the knee surgery. She did blood work 10 days ago and results were discussed with her and her  in detail today including slightly worsening serum creatinine and GFR, within normal potassium, low sodium 132. She  reports occasional headache and she takes Tylenol. She has leg swelling. She is adherent to hydrochlorothiazide and rest of medications. Uric acid improved and now normal on febuxostat.  had many questions and all of them were discussed in details and answered. Overall, kidney function is slowly worsening and I anticipate starting dialysis in her lifetime. Blood pressure is elevated today     Per prior notes     Last office visit October 2021. In the interim, she continues to follow a low-salt diet. She started febuxostat for elevated uric acid with improvement in her knee pain. She did not see a dietitian and she work with one of her friends on healthier diet choices. Weight is stable. She still considering risks and benefits of knee replacement surgery and she is planned to see the orthopedic surgeon in the next few weeks or so. Blood pressure is under good control. No lower urine tract symptoms.  is concerned about limited diet options and not eating well. She agreed to start multivitamins. She reports better taste in the mouth that might be related to febuxostat. Weighing the risks versus benefits of continuing medication. Given her recommendation regarding mouthwash, chewing gums, ice cubes in the mouth etc. Overall she is stable. I reviewed blood work and urinalysis with her in details including stable serum creatinine, GFR, improving albuminuria, normal vitamin D, significant improvement in uric acid after starting Uloric, stable mild anemia     Per prior notes     Last office visit August 2021. We discussed Advil and negative impact on kidneys. Instructed to hold Advil and follow conservative measures. In the interim, she continues to work on healthier diet choices. She does not check blood pressure at home. She could not tolerate allopurinol due to stomach issues. She is adherent to blood pressure medications but she missed the blood pressure medication yesterday. Blood pressure is elevated  today. Repeat blood work showed stable poor GFR. Kidney ultrasound with renal atrophy. Magi came today with her . She reports occasional bilateral flank pain and she is concerned if she has UTI. She will get her booster Covid vaccine as well as flu shot soon. No NSAID use at home. She still has bad knee arthritis and she follows with orthopedic surgery. Recently she started using a walker which is helping her. No urinary tract symptoms, burning or pain with urination only occasional flank pain. No excessive leg swelling or nausea or vomiting. She successfully lost about 10 pounds since last visit        Per prior notes  Magi is coming today with her . He reports knowing about CKD 4 for few years. She recently was undergoing knee replacement therapy and she was asked to get renal clearance due to noticed worsening kidney function. She reports consuming 6 pills of 200 mg Advil daily (total dose of 1200 mg) for the last 5-6 years. Earlier, she had a history of GI bleed due to NSAID use and drop in hemoglobin for which she received a blood transfusion.     She reports making less urine recently. She is not a good water drinker. She does not limit salt intake. Recently she was instructed to lose weight for surgery. She started following [employer] where she gets healthier low-calorie food at home in the last 2 months with appropriate weight loss about 10 pounds. She does not take any herbal supplements or use any chemicals to lose weight. She denies burning, pain or blood in urine. She has noticed that her urine has become thicker recently. No significant leg swelling. Blood pressure is in good control recently. No history of diabetes. She started oxybutynin recently for overactive bladder with significant improvement     Social:  and lives with her . She she used to work as a  in the [employer], no smoking or wine  Family history: 4 brothers with history of CABG, mother  of  "heart issues and CABG, sister with bladder cancer  at the age of 70, brother  with heart problems. No dialysis history     Review of Systems     Constitutional: +fatigue, no fever,~no chills,~no recent weight gain, weight loss  Eyes: no blurred vision~and~no diplopia.   ENT: no hearing loss,~no earache,~no sore throat,~no swollen glands in the neck~and~no nasal discharge.   Cardiovascular: no chest pain,~no palpitations~and~ lower extremity edema.   Respiratory: no shortness of breath,~and~no shortness of breath during exertion. , cough  Gastrointestinal: no diarrhea, but~no abdominal pain,~no constipation,~no heartburn,~no vomiting,~no bloody stools~and~no change in bowel movements.   Genitourinary: no dysuria~and~no hematuria.   Musculoskeletal: no arthralgias~and~no myalgias.   Skin: no rashes~and~no skin lesions.   Neurological:no dizziness.   Psychiatric: no confusion,~no depression~and~no anxiety.   Endocrine: no heat intolerance,~no cold intolerance,~appetite not increased,~no thyroid disorder,~no increased urinary frequency~and~no dry skin.   Hematologic/Lymphatic: does not bleed easily~and~does not bruise easily.   All other systems have been reviewed and are negative for complaint.       Objective   /82 (BP Location: Right arm, Patient Position: Sitting, BP Cuff Size: Large adult)   Pulse 89   Temp 36.4 °C (97.5 °F)   Resp 16   Ht 1.626 m (5' 4\")   Wt 94.8 kg (209 lb)   SpO2 95%   BMI 35.87 kg/m²   Wt Readings from Last 3 Encounters:   08/15/24 94.8 kg (209 lb)   24 98.2 kg (216 lb 9.6 oz)   24 104 kg (229 lb)       Physical Exam    General appearance: no distress awake and alert on room air, not significantly hypervolemic on exam  Eyes: non-icteric  HEENT: atrumatic head, PEERLA, moist mucosa  Skin: no apparent rash  Heart: NSR, S1, S2 normal, no murmur or gallop  Lungs: Symmetrical expansion, wheezing bilaterally   Abdomen: soft, nt/nd, obese  Extremities: edema " "bilat-wrapped bilateral, stasis dermatitis. R foot wound in boot  Neuro: No FND,asterixis, no focal deficits noticed        Data Review                       No lab exists for component: \"LABALBU\"      Lab Results   Component Value Date    URICACID 4.7 08/08/2024       No components found for: \"[id number]\"      No results found for: \"HGBA1C\"      Lab Results   Component Value Date    CALCIUM 9.6 08/08/2024    PHOS 3.3 08/08/2024         No results found for requested labs within last 365 days.              No lab exists for component: \"CR\", \"PHOSPHORUS\"            Albumin/Creatinine Ratio   Date Value Ref Range Status   08/08/2024   Final     Comment:     One or more analytes used in this calculation is outside of the analytical measurement range. Calculation cannot be performed.   05/29/2024 48.7 (H) <30.0 ug/mg Creat Final   04/09/2024 27.8 ug/mg Creat Final       Assessment and Plan     Ms. Boyd is a 79-year-old white female with past medical history of hypertension, hyperlipidemia, knee osteoarthritis, hypothyroidism, overactive bladder, asthma, remote history of GI bleed, heavy NSAID use, asthma and CKD who is coming today for advanced CKD follow-up     Impression     # Chronic kidney disease -G4-->5/A1 - worsening Cr   - Baseline SCr 2-2.5, GFR 15-20 mils per minute per 1.73 mÃ‚Â² (new baseline)  - Most recent serum creatinine is now 2.9, GFR is 16 ml/min/ 1.73 m in August-->improved. Now considered stage IV.  - Most likely related to atherosclerotic cardiovascular disease and significant history of NSAID use  -Most recent ACR not measured, random urine albumin <7 from 32   -Electrolytes: Acceptable potassium, albumin and no significant acidosis.  -Previous kidney U/S August 2021 showed shrunk right kidney 8.5 cm with increased echogenicity, left kidney 10.4 cm. No stones or masses  -Declined kidney transplant evaluation  -Not a good candidate for AV fistula. Arteriovenous graft as needed per vascular " surgery, discussed this with pt.   -No immediate need to start dialysis.  Close follow-up.     #Urinary incontinence, new onset increased frequency   - preexisting, currently on trospium per urology.  -Oxybutynin was discontinued during last hospital admission April 2024 due to orthostatic hypotension     #BLE edema, possible lymphedema  - Will continue Lasix 40 mg daily     # Blood Pressure  - Today BP is 138/82 Home blood pressure checks average 130/60  - Current meds: Furosemide 40 mg qd  - Instructed to check blood pressure at home     #Anemia  - Hgb 10-11, improved  - Iron saturation 28% improved from prior   - Ferritin elevated  - prior labs showed iron deficiency anemia  - Pt continues with PO iron BID.      #BMD  - Calcium, phosphorus, albumin within reference values  -PTH minimally elevated at 108     # CVS-high risk  - On statins     #Uric Acid  - Previously unable to tolerate allopurinol due to GI issues.   - Doing well on febuxostat (started 10/2021), most recent uric acid 3-4 mg/dl  - Continue current regimen     General CKD recommendations:  - Avoid NSAIDS  - Avoid contrast as possible. If needed, hold ACEi/ARB/diuretic 24 hours prior to exposure, ensure appropriate hydration. For inpatient, consider IVF prior to and after  - Diet: limit salt, avoid processed foods  - For nausea/vomiting/ ulcer protection, prefer avoid PPI, H2 blocker, sucralfate okay.  - Tobacco cessation     Follow up 2-3 months with repeat blood work and urine analysis, iron studies, CBC, RFP prior.     SAY Olson IV, MD, MS, TATYANA ROSARIO  Clinical  - [facility] [employer]  Nephrologist - [facility] - [facility]

## 2024-08-16 ENCOUNTER — OFFICE VISIT (OUTPATIENT)
Dept: WOUND CARE | Facility: HOSPITAL | Age: 79
End: 2024-08-16
Payer: MEDICARE

## 2024-08-16 PROCEDURE — 99212 OFFICE O/P EST SF 10 MIN: CPT

## 2024-08-20 ENCOUNTER — APPOINTMENT (OUTPATIENT)
Dept: PODIATRY | Facility: CLINIC | Age: 79
End: 2024-08-20
Payer: MEDICARE

## 2024-08-30 DIAGNOSIS — M10.00 IDIOPATHIC GOUT, UNSPECIFIED CHRONICITY, UNSPECIFIED SITE: Primary | ICD-10-CM

## 2024-09-03 RX ORDER — FEBUXOSTAT 40 MG/1
40 TABLET, FILM COATED ORAL DAILY
Qty: 90 TABLET | Refills: 0 | Status: SHIPPED | OUTPATIENT
Start: 2024-09-03

## 2024-09-06 DIAGNOSIS — D50.8 OTHER IRON DEFICIENCY ANEMIA: ICD-10-CM

## 2024-09-06 RX ORDER — FERROUS GLUCONATE 324(38)MG
1 TABLET ORAL 2 TIMES DAILY
Qty: 180 TABLET | Refills: 3 | Status: SHIPPED | OUTPATIENT
Start: 2024-09-06 | End: 2025-09-06

## 2024-09-17 ENCOUNTER — APPOINTMENT (OUTPATIENT)
Dept: PODIATRY | Facility: CLINIC | Age: 79
End: 2024-09-17
Payer: MEDICARE

## 2024-09-17 DIAGNOSIS — M79.671 PAIN IN BOTH FEET: Primary | ICD-10-CM

## 2024-09-17 DIAGNOSIS — M79.672 PAIN IN BOTH FEET: Primary | ICD-10-CM

## 2024-09-17 DIAGNOSIS — R60.9 EDEMA, UNSPECIFIED TYPE: ICD-10-CM

## 2024-09-17 DIAGNOSIS — I73.9 PVD (PERIPHERAL VASCULAR DISEASE) (CMS-HCC): ICD-10-CM

## 2024-09-17 DIAGNOSIS — B35.1 ONYCHOMYCOSIS: ICD-10-CM

## 2024-09-17 DIAGNOSIS — I73.9 PAD (PERIPHERAL ARTERY DISEASE) (CMS-HCC): ICD-10-CM

## 2024-09-17 PROCEDURE — 1159F MED LIST DOCD IN RCRD: CPT | Performed by: PODIATRIST

## 2024-09-17 PROCEDURE — 99212 OFFICE O/P EST SF 10 MIN: CPT | Performed by: PODIATRIST

## 2024-09-17 PROCEDURE — 1160F RVW MEDS BY RX/DR IN RCRD: CPT | Performed by: PODIATRIST

## 2024-09-17 PROCEDURE — 1123F ACP DISCUSS/DSCN MKR DOCD: CPT | Performed by: PODIATRIST

## 2024-09-17 PROCEDURE — 1036F TOBACCO NON-USER: CPT | Performed by: PODIATRIST

## 2024-09-19 ENCOUNTER — APPOINTMENT (OUTPATIENT)
Dept: PRIMARY CARE | Facility: CLINIC | Age: 79
End: 2024-09-19
Payer: MEDICARE

## 2024-09-19 VITALS
DIASTOLIC BLOOD PRESSURE: 82 MMHG | HEART RATE: 69 BPM | HEIGHT: 64 IN | SYSTOLIC BLOOD PRESSURE: 125 MMHG | BODY MASS INDEX: 36.88 KG/M2 | OXYGEN SATURATION: 95 % | WEIGHT: 216 LBS

## 2024-09-19 DIAGNOSIS — Z00.00 WELLNESS EXAMINATION: ICD-10-CM

## 2024-09-19 DIAGNOSIS — G89.29 CHRONIC PAIN OF RIGHT KNEE: ICD-10-CM

## 2024-09-19 DIAGNOSIS — E78.2 MIXED HYPERLIPIDEMIA: ICD-10-CM

## 2024-09-19 DIAGNOSIS — Z00.00 ROUTINE GENERAL MEDICAL EXAMINATION AT HEALTH CARE FACILITY: Primary | ICD-10-CM

## 2024-09-19 DIAGNOSIS — E03.9 HYPOTHYROIDISM, UNSPECIFIED TYPE: ICD-10-CM

## 2024-09-19 DIAGNOSIS — Z23 FLU VACCINE NEED: ICD-10-CM

## 2024-09-19 DIAGNOSIS — M25.561 CHRONIC PAIN OF RIGHT KNEE: ICD-10-CM

## 2024-09-19 DIAGNOSIS — Z00.00 MEDICARE ANNUAL WELLNESS VISIT, SUBSEQUENT: ICD-10-CM

## 2024-09-19 DIAGNOSIS — N95.2 POST-MENOPAUSAL ATROPHIC VAGINITIS: ICD-10-CM

## 2024-09-19 PROCEDURE — G0008 ADMIN INFLUENZA VIRUS VAC: HCPCS | Performed by: NURSE PRACTITIONER

## 2024-09-19 PROCEDURE — 90662 IIV NO PRSV INCREASED AG IM: CPT | Performed by: NURSE PRACTITIONER

## 2024-09-19 PROCEDURE — 1158F ADVNC CARE PLAN TLK DOCD: CPT | Performed by: NURSE PRACTITIONER

## 2024-09-19 PROCEDURE — 1036F TOBACCO NON-USER: CPT | Performed by: NURSE PRACTITIONER

## 2024-09-19 PROCEDURE — 99213 OFFICE O/P EST LOW 20 MIN: CPT | Performed by: NURSE PRACTITIONER

## 2024-09-19 PROCEDURE — 1170F FXNL STATUS ASSESSED: CPT | Performed by: NURSE PRACTITIONER

## 2024-09-19 PROCEDURE — 3074F SYST BP LT 130 MM HG: CPT | Performed by: NURSE PRACTITIONER

## 2024-09-19 PROCEDURE — 1123F ACP DISCUSS/DSCN MKR DOCD: CPT | Performed by: NURSE PRACTITIONER

## 2024-09-19 PROCEDURE — 1160F RVW MEDS BY RX/DR IN RCRD: CPT | Performed by: NURSE PRACTITIONER

## 2024-09-19 PROCEDURE — 1159F MED LIST DOCD IN RCRD: CPT | Performed by: NURSE PRACTITIONER

## 2024-09-19 PROCEDURE — 99397 PER PM REEVAL EST PAT 65+ YR: CPT | Performed by: NURSE PRACTITIONER

## 2024-09-19 PROCEDURE — 3079F DIAST BP 80-89 MM HG: CPT | Performed by: NURSE PRACTITIONER

## 2024-09-19 PROCEDURE — G0439 PPPS, SUBSEQ VISIT: HCPCS | Performed by: NURSE PRACTITIONER

## 2024-09-19 RX ORDER — LEVOTHYROXINE SODIUM 75 UG/1
75 TABLET ORAL EVERY MORNING
Qty: 90 TABLET | Refills: 3 | Status: CANCELLED | OUTPATIENT
Start: 2024-09-19

## 2024-09-19 RX ORDER — ATORVASTATIN CALCIUM 20 MG/1
20 TABLET, FILM COATED ORAL EVERY EVENING
Qty: 90 TABLET | Refills: 3 | Status: SHIPPED | OUTPATIENT
Start: 2024-09-19

## 2024-09-19 RX ORDER — ATORVASTATIN CALCIUM 20 MG/1
20 TABLET, FILM COATED ORAL EVERY EVENING
Qty: 90 TABLET | Refills: 3 | Status: CANCELLED | OUTPATIENT
Start: 2024-09-19

## 2024-09-19 RX ORDER — LEVOTHYROXINE SODIUM 75 UG/1
75 TABLET ORAL EVERY MORNING
Qty: 90 TABLET | Refills: 3 | Status: SHIPPED | OUTPATIENT
Start: 2024-09-19

## 2024-09-19 ASSESSMENT — ACTIVITIES OF DAILY LIVING (ADL)
MANAGING_FINANCES: INDEPENDENT
GROCERY_SHOPPING: INDEPENDENT
DRESSING: INDEPENDENT
DOING_HOUSEWORK: INDEPENDENT
BATHING: INDEPENDENT
TAKING_MEDICATION: INDEPENDENT

## 2024-09-19 ASSESSMENT — PATIENT HEALTH QUESTIONNAIRE - PHQ9
1. LITTLE INTEREST OR PLEASURE IN DOING THINGS: MORE THAN HALF THE DAYS
5. POOR APPETITE OR OVEREATING: MORE THAN HALF THE DAYS
3. TROUBLE FALLING OR STAYING ASLEEP OR SLEEPING TOO MUCH: MORE THAN HALF THE DAYS
9. THOUGHTS THAT YOU WOULD BE BETTER OFF DEAD, OR OF HURTING YOURSELF: NOT AT ALL
SUM OF ALL RESPONSES TO PHQ QUESTIONS 1-9: 10
4. FEELING TIRED OR HAVING LITTLE ENERGY: MORE THAN HALF THE DAYS
10. IF YOU CHECKED OFF ANY PROBLEMS, HOW DIFFICULT HAVE THESE PROBLEMS MADE IT FOR YOU TO DO YOUR WORK, TAKE CARE OF THINGS AT HOME, OR GET ALONG WITH OTHER PEOPLE: SOMEWHAT DIFFICULT
8. MOVING OR SPEAKING SO SLOWLY THAT OTHER PEOPLE COULD HAVE NOTICED. OR THE OPPOSITE, BEING SO FIGETY OR RESTLESS THAT YOU HAVE BEEN MOVING AROUND A LOT MORE THAN USUAL: NOT AT ALL
7. TROUBLE CONCENTRATING ON THINGS, SUCH AS READING THE NEWSPAPER OR WATCHING TELEVISION: NOT AT ALL
6. FEELING BAD ABOUT YOURSELF - OR THAT YOU ARE A FAILURE OR HAVE LET YOURSELF OR YOUR FAMILY DOWN: NOT AT ALL
SUM OF ALL RESPONSES TO PHQ9 QUESTIONS 1 AND 2: 4
2. FEELING DOWN, DEPRESSED OR HOPELESS: MORE THAN HALF THE DAYS

## 2024-09-19 ASSESSMENT — ENCOUNTER SYMPTOMS
DEPRESSION: 1
LOSS OF SENSATION IN FEET: 0
OCCASIONAL FEELINGS OF UNSTEADINESS: 1

## 2024-09-19 NOTE — PROGRESS NOTES
"Subjective   Patient ID: Magi Boyd is a 79 y.o. female who presents for Medicare Annual Wellness Visit Subsequent (Patient is here today for a medicare wellness visit. Patient would like to review her medications. ).    79 year old female here for annual CPE and annual medicare wellness exam  She is accompanied by her   CKD- managed by nephrology  Chronic RT knee deformity and arthritis. She is unable to ambulate due to the rotation of the RT knee inward  Recurrent UTI, OAB- will see urology in October poss Botox injections  Podiatry- Dr Martino. Recent RT heel infections was in the hospital 10 days then rehab for 2 weeks  HLD- on statin. Last LP 11/2022  Asthma- symbicort BID. Albuterol PRN. Will require more if not using the symbicort regularly. Now does not require the albuterol  Hypothyroid- on T4. 75 mcg 1 tab every day. Last TSH 11/2023    Prevention:  Breast Ca screen: not indicated; No fam HX  Colon Ca Screen: not indicated; no fam HX  Lung Ca Screen: no fam HX  DEXA: has never had done.   CT Cardiac Score:  4 brothers CV disease-CABG  Diabetes Screen: sister and 2 brothers DM2  Opthal: wears glasses. Over due- last OV was 5 years  Dental: has not seen in 3 years  Exercise- unable  Diet: caffeine- 1 C coffee a day; 2 meals a day, small amounts. Meals on wheels. She does not like this food so she will eat small snack from home. Likes sweets  ETOH: none  No nicotene              Review of Systems    Objective   /82   Pulse 69   Ht 1.626 m (5' 4\")   Wt 98 kg (216 lb)   SpO2 95%   BMI 37.08 kg/m²     Physical Exam  Constitutional:       Appearance: Normal appearance. She is obese.   Eyes:      Extraocular Movements: Extraocular movements intact.      Pupils: Pupils are equal, round, and reactive to light.   Neck:      Vascular: No carotid bruit.   Cardiovascular:      Rate and Rhythm: Normal rate and regular rhythm.      Pulses: Normal pulses.      Heart sounds: Normal heart sounds. "   Pulmonary:      Effort: Pulmonary effort is normal.      Breath sounds: Wheezing present.   Musculoskeletal:         General: Normal range of motion.      Cervical back: Normal range of motion and neck supple.      Right lower leg: Edema present.      Left lower leg: Edema present.   Lymphadenopathy:      Cervical: No cervical adenopathy.   Skin:     General: Skin is warm.   Neurological:      General: No focal deficit present.      Mental Status: She is alert and oriented to person, place, and time.   Psychiatric:         Mood and Affect: Mood normal.         Behavior: Behavior normal.         Thought Content: Thought content normal.         Assessment/Plan   Diagnoses and all orders for this visit:  Routine general medical examination at health care facility  -     1 Year Follow Up In Primary Care - Wellness Exam; Future  Medicare annual wellness visit, subsequent  Comments:  due to age screening is not warrented  Wellness examination  Chronic pain of right knee  -     Referral to Physical Therapy; Future  Post-menopausal atrophic vaginitis  Hypothyroidism, unspecified type  -     TSH with reflex to Free T4 if abnormal; Future  -     levothyroxine (Synthroid, Levoxyl) 75 mcg tablet; Take 1 tablet (75 mcg) by mouth once daily in the morning.  Mixed hyperlipidemia  -     Lipid Panel; Future  -     atorvastatin (Lipitor) 20 mg tablet; Take 1 tablet (20 mg) by mouth once daily in the evening.  Flu vaccine need  Other orders  -     Follow Up In Primary Care - Established  -     Follow Up In Primary Care - Established; Future  -     Follow Up In Primary Care - Medicare Annual; Future

## 2024-09-21 NOTE — PROGRESS NOTES
History of Present Illness:   This 79 year old female  Presents for follow up pain  Recently dc from wound care  States the pain is greatly improved to heel  Denies trauma  No other pedal complaints  Unable to safely trim nails on own    Past Medical History  Past Medical History:   Diagnosis Date    Acute UTI 04/20/2023    Adverse reaction to NSAIDs, sequela 04/20/2023    Benign essential hypertension 04/20/2023    Bilateral leg and foot pain 04/20/2023    Body mass index (BMI)40.0-44.9, adult 10/18/2021    Body mass index (BMI) of 40.0 to 44.9 in adult    CKD (chronic kidney disease) 04/20/2023    Disorder of both eustachian tubes 04/20/2023    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    GERD (gastroesophageal reflux disease) 04/20/2023    Lower urinary tract symptoms 04/20/2023    Obesity, unspecified 08/01/2022    Class 2 obesity with body mass index (BMI) of 39.0 to 39.9 in adult    Otalgia, left ear 02/18/2020    Earache, left    Other abnormal glucose 09/29/2016    Abnormal glucose    Other specified abnormal findings of blood chemistry 10/05/2016    Abnormal thyroid blood test    Other specified disorders of ear, unspecified ear 03/18/2015    Fullness in ear    Pelvic and perineal pain 10/24/2019    Pelvic pressure in female    Personal history of other diseases of the respiratory system 12/05/2013    History of acute bronchitis    Personal history of other diseases of the respiratory system 03/18/2015    History of acute sinusitis    Personal history of other diseases of the respiratory system 12/17/2019    History of upper respiratory infection    Personal history of other drug therapy 10/25/2019    History of influenza vaccination    Personal history of other endocrine, nutritional and metabolic disease     History of dehydration    Personal history of other specified conditions 09/26/2013    History of fatigue    Personal history of other specified conditions 10/29/2013    History of shortness of breath     Personal history of other specified conditions 02/18/2020    History of nasal congestion    Personal history of other specified conditions 02/18/2020    History of postnasal drip    Personal history of other specified conditions 02/09/2015    History of diarrhea    Personal history of urinary (tract) infections 12/18/2018    History of urinary tract infection    Persons encountering health services in other specified circumstances 10/25/2019    Encounter to establish care    Right knee pain 04/20/2023    Simple chronic bronchitis (CMS/HCC) 08/23/2023    SOB (shortness of breath) 08/23/2023    Toe pain 08/23/2023    Unspecified abnormal findings in urine 08/25/2021    Abnormal urinalysis    Urinary tract infection, site not specified 10/18/2021    Acute lower UTI    Urinary tract infection, site not specified 08/30/2022    Acute UTI       Medications and Allergies have been reviewed.    Review Of Systems:  GENERAL: No weight loss, malaise or fevers.  HEENT: Negative for frequent or significant headaches,   RESPIRATORY: Negative for cough, wheezing or shortness of breath.  CARDIOVASCULAR: Negative for chest pain, leg swelling or palpitations.    Physical Exam:  Vascular exam: Dorsalis pedis and Posterior Tibial pulses palpable 2/4 bilateral. Capillary refill time less than 3 seconds b/l. Hair growth noted to digits. +2 b/l LE edema noted. Skin temp warm to warm from proximal to distal b/l. + varicosities noted.     Neurologic exam: Gross sensation present b/l. No neuro deficits noted b/l      Musculoskeletal exam: Muscle strength 5/5 for all major muscle groups tested in the lower extremity b/l. No gross deformities noted b/l. Pain to right plantar and post heel . NO Soi noted. However tender with palpation     Dermatologic exam:  Webspaces 1-4 b/l are clean, dry and intact.  Nails thick and discolored and elongated  1. Pain in both feet        2. Onychomycosis        3. PVD (peripheral vascular disease) (CMS-[facility])         4. Edema, unspecified type        5. PAD (peripheral artery disease) (CMS-HCC)          Patient exam and eval  Pleased with progress  Continue with wound care plan  Continue to closely monitor heels  Debrided nails  Fu 3 mos  Sooner if any new prob arise.   Pt and hub in agreement to plan    Kirstie Martino DPM  [phone]  [facility] [address]  Fax: [phone]

## 2024-10-01 ENCOUNTER — LAB (OUTPATIENT)
Dept: LAB | Facility: LAB | Age: 79
End: 2024-10-01
Payer: MEDICARE

## 2024-10-01 DIAGNOSIS — E78.2 MIXED HYPERLIPIDEMIA: ICD-10-CM

## 2024-10-01 DIAGNOSIS — E03.9 HYPOTHYROIDISM, UNSPECIFIED TYPE: ICD-10-CM

## 2024-10-01 LAB
CHOLEST SERPL-MCNC: 140 MG/DL (ref 0–199)
CHOLESTEROL/HDL RATIO: 3.3
HDLC SERPL-MCNC: 42.4 MG/DL
LDLC SERPL CALC-MCNC: 72 MG/DL
NON HDL CHOLESTEROL: 98 MG/DL (ref 0–149)
TRIGL SERPL-MCNC: 129 MG/DL (ref 0–149)
TSH SERPL-ACNC: 2.32 MIU/L (ref 0.44–3.98)
VLDL: 26 MG/DL (ref 0–40)

## 2024-10-01 PROCEDURE — 80061 LIPID PANEL: CPT

## 2024-10-01 PROCEDURE — 36415 COLL VENOUS BLD VENIPUNCTURE: CPT

## 2024-10-01 PROCEDURE — 84443 ASSAY THYROID STIM HORMONE: CPT

## 2024-10-28 ENCOUNTER — APPOINTMENT (OUTPATIENT)
Dept: UROLOGY | Facility: CLINIC | Age: 79
End: 2024-10-28
Payer: MEDICARE

## 2024-11-19 ENCOUNTER — LAB (OUTPATIENT)
Dept: LAB | Facility: LAB | Age: 79
End: 2024-11-19
Payer: MEDICARE

## 2024-11-19 DIAGNOSIS — I89.0 LYMPHEDEMA: ICD-10-CM

## 2024-11-19 DIAGNOSIS — I12.9 BENIGN HYPERTENSION WITH CKD (CHRONIC KIDNEY DISEASE) STAGE IV (MULTI): ICD-10-CM

## 2024-11-19 DIAGNOSIS — N18.4 BENIGN HYPERTENSION WITH CKD (CHRONIC KIDNEY DISEASE) STAGE IV (MULTI): ICD-10-CM

## 2024-11-19 DIAGNOSIS — N18.5 CKD (CHRONIC KIDNEY DISEASE), STAGE V (MULTI): ICD-10-CM

## 2024-11-19 DIAGNOSIS — M10.00 IDIOPATHIC GOUT, UNSPECIFIED CHRONICITY, UNSPECIFIED SITE: ICD-10-CM

## 2024-11-19 DIAGNOSIS — N32.81 OAB (OVERACTIVE BLADDER): ICD-10-CM

## 2024-11-19 DIAGNOSIS — R60.0 LOCALIZED EDEMA: ICD-10-CM

## 2024-11-19 DIAGNOSIS — N18.4 STAGE 4 CHRONIC KIDNEY DISEASE (MULTI): ICD-10-CM

## 2024-11-19 LAB
25(OH)D3 SERPL-MCNC: 64 NG/ML (ref 30–100)
ALBUMIN SERPL BCP-MCNC: 3.8 G/DL (ref 3.4–5)
ANION GAP SERPL CALC-SCNC: 14 MMOL/L (ref 10–20)
BUN SERPL-MCNC: 39 MG/DL (ref 6–23)
CALCIUM SERPL-MCNC: 9.7 MG/DL (ref 8.6–10.3)
CHLORIDE SERPL-SCNC: 104 MMOL/L (ref 98–107)
CO2 SERPL-SCNC: 26 MMOL/L (ref 21–32)
CREAT SERPL-MCNC: 3.37 MG/DL (ref 0.5–1.05)
CREAT UR-MCNC: 112.6 MG/DL (ref 20–320)
EGFRCR SERPLBLD CKD-EPI 2021: 14 ML/MIN/1.73M*2
ERYTHROCYTE [DISTWIDTH] IN BLOOD BY AUTOMATED COUNT: 12.6 % (ref 11.5–14.5)
FERRITIN SERPL-MCNC: 149 NG/ML (ref 8–150)
GLUCOSE SERPL-MCNC: 87 MG/DL (ref 74–99)
HCT VFR BLD AUTO: 33.8 % (ref 36–46)
HGB BLD-MCNC: 10.9 G/DL (ref 12–16)
IRON SATN MFR SERPL: 24 % (ref 25–45)
IRON SERPL-MCNC: 74 UG/DL (ref 35–150)
MCH RBC QN AUTO: 31.3 PG (ref 26–34)
MCHC RBC AUTO-ENTMCNC: 32.2 G/DL (ref 32–36)
MCV RBC AUTO: 97 FL (ref 80–100)
MICROALBUMIN UR-MCNC: 93 MG/L
MICROALBUMIN/CREAT UR: 82.6 UG/MG CREAT
NRBC BLD-RTO: 0 /100 WBCS (ref 0–0)
PHOSPHATE SERPL-MCNC: 3.4 MG/DL (ref 2.5–4.9)
PLATELET # BLD AUTO: 158 X10*3/UL (ref 150–450)
POTASSIUM SERPL-SCNC: 3.6 MMOL/L (ref 3.5–5.3)
PTH-INTACT SERPL-MCNC: 146.1 PG/ML (ref 18.5–88)
RBC # BLD AUTO: 3.48 X10*6/UL (ref 4–5.2)
SODIUM SERPL-SCNC: 140 MMOL/L (ref 136–145)
TIBC SERPL-MCNC: 310 UG/DL (ref 240–445)
UIBC SERPL-MCNC: 236 UG/DL (ref 110–370)
WBC # BLD AUTO: 4.7 X10*3/UL (ref 4.4–11.3)

## 2024-11-19 PROCEDURE — 36415 COLL VENOUS BLD VENIPUNCTURE: CPT

## 2024-11-19 PROCEDURE — 82306 VITAMIN D 25 HYDROXY: CPT

## 2024-11-19 PROCEDURE — 85027 COMPLETE CBC AUTOMATED: CPT

## 2024-11-19 PROCEDURE — 83540 ASSAY OF IRON: CPT

## 2024-11-19 PROCEDURE — 83970 ASSAY OF PARATHORMONE: CPT

## 2024-11-19 PROCEDURE — 82570 ASSAY OF URINE CREATININE: CPT

## 2024-11-19 PROCEDURE — 82728 ASSAY OF FERRITIN: CPT

## 2024-11-19 PROCEDURE — 82043 UR ALBUMIN QUANTITATIVE: CPT

## 2024-11-19 PROCEDURE — 83550 IRON BINDING TEST: CPT

## 2024-11-19 PROCEDURE — 80069 RENAL FUNCTION PANEL: CPT

## 2024-11-25 ENCOUNTER — APPOINTMENT (OUTPATIENT)
Dept: NEPHROLOGY | Facility: CLINIC | Age: 79
End: 2024-11-25
Payer: MEDICARE

## 2024-11-25 VITALS
WEIGHT: 211 LBS | HEIGHT: 64 IN | BODY MASS INDEX: 36.02 KG/M2 | DIASTOLIC BLOOD PRESSURE: 81 MMHG | SYSTOLIC BLOOD PRESSURE: 130 MMHG

## 2024-11-25 DIAGNOSIS — I89.0 LYMPHEDEMA: ICD-10-CM

## 2024-11-25 DIAGNOSIS — N18.5 CKD (CHRONIC KIDNEY DISEASE), STAGE V (MULTI): ICD-10-CM

## 2024-11-25 DIAGNOSIS — R60.0 LOCALIZED EDEMA: ICD-10-CM

## 2024-11-25 DIAGNOSIS — N18.4 BENIGN HYPERTENSION WITH CKD (CHRONIC KIDNEY DISEASE) STAGE IV (MULTI): ICD-10-CM

## 2024-11-25 DIAGNOSIS — N32.81 OVERACTIVE BLADDER: ICD-10-CM

## 2024-11-25 DIAGNOSIS — N32.81 OAB (OVERACTIVE BLADDER): ICD-10-CM

## 2024-11-25 DIAGNOSIS — N18.5 CKD (CHRONIC KIDNEY DISEASE) STAGE 5, GFR LESS THAN 15 ML/MIN (MULTI): Primary | ICD-10-CM

## 2024-11-25 DIAGNOSIS — M10.00 IDIOPATHIC GOUT, UNSPECIFIED CHRONICITY, UNSPECIFIED SITE: ICD-10-CM

## 2024-11-25 DIAGNOSIS — I12.9 BENIGN HYPERTENSION WITH CKD (CHRONIC KIDNEY DISEASE) STAGE IV (MULTI): ICD-10-CM

## 2024-11-25 PROCEDURE — G2211 COMPLEX E/M VISIT ADD ON: HCPCS | Performed by: INTERNAL MEDICINE

## 2024-11-25 PROCEDURE — 3079F DIAST BP 80-89 MM HG: CPT | Performed by: INTERNAL MEDICINE

## 2024-11-25 PROCEDURE — 99214 OFFICE O/P EST MOD 30 MIN: CPT | Performed by: INTERNAL MEDICINE

## 2024-11-25 PROCEDURE — 1123F ACP DISCUSS/DSCN MKR DOCD: CPT | Performed by: INTERNAL MEDICINE

## 2024-11-25 PROCEDURE — 1159F MED LIST DOCD IN RCRD: CPT | Performed by: INTERNAL MEDICINE

## 2024-11-25 PROCEDURE — 3075F SYST BP GE 130 - 139MM HG: CPT | Performed by: INTERNAL MEDICINE

## 2024-11-25 NOTE — PATIENT INSTRUCTIONS
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  Kidney function however around 15%-relatively stable.  No need to start dialysis yet.  Will continue to monitor closely  #Hypertension: Blood pressure is in target at home-and no significant weight gain.  Continue  Lasix 40 mg.    #Uric acid is improved a lot-continue febuxostat once daily  #Leg swelling and improper wound healing-improved.  Continue Lasix 40 mg daily.   #If you need dialysis we will get graft inserted-will let the vascular surgeon know when we needed  #Urine frequency and recent incontinence-continue to follow with urology  #Iron deficiency anemia-improved significantly-continue oral iron  # Normal vitamin D-no need for supplements        I will see you again in 2-3 months with another set of blood work prior to visit     Please call our office if you have any question  Thank you for coming to see me today     Laura Narayan MD, MS, TATYANA ROSARIO  Clinical  - [employer]  Nephrologist - [facility] - [facility]

## 2024-11-25 NOTE — PROGRESS NOTES
Subjective     Ms. Boyd is a 79-year-old white female with past medical history of hypertension, hyperlipidemia, knee osteoarthritis, hypothyroidism, overactive bladder, asthma, remote history of GI bleed, heavy NSAID use, asthma and CKD who is coming today for advanced CKD follow-up.    Last office visit August 2024.  Magi was evaluated virtually today.  She continues to have sinusitis-currently not on antibiotics.  She reports napping in the daytime and irregular sleep at night.  No kidney related complaints or concerns.  No significant weight gain.  Continues to produce good amount of urine.  No uremic symptoms.  No significant hypervolemia.  Repeat blood work showed stable serum creatinine 3.3 and GFR 14 consistent with prior baseline.  With normal extra lites.  Stable anemia hemoglobin 10.9-continues to be on iron supplements.  Today we discussed advanced CKD and stable course.  No indication to initiate dialysis yet.  Will continue to monitor closely    Prior notes    Magi comes in today with her . GFR and Scr improved over the past couple months. Informs us that her BP decreased last month since she lost 50 pounds in the last year. Atenolol was discontinued. Needs refill for Lasix today. Denies any blurry vision, headache, confusion. Diagnosed with UTI in June, treated with Bactrim. No new urinary symptoms.       June 2024:  Last office visit February 2024.  Patient came today with her .  In the interim, she had hospital admission in April 2024 with CHF, wound infection treated with cefazolin and she developed acute kidney injury top of chronic kidney disease.  Today, patient is awake and alert.  Not uremic or significant hypervolemic on exam.  She continues to be on Lasix 40 mg.  Leg swelling is stable and actually improved from prior.  She continues to produce urine.  She continues to have frequency of urination.  She follows with urology.  Blood pressures accepted.  Home blood  pressure log was reviewed and showed 110-130/70-80 and target.  No hyperkalemia in most recent blood work.  No significant acidosis.  Overall she is compensated with no immediate need to start dialysis.  I went over uremic/hypervolemic symptoms with wife and  today that might mandate initiation of dialysis      Prior notes    Last office visit November 2023.  Patient came today with her .  Continues to have significant leg swelling.  Reports slowing down her urine output was urine frequency at night.  She continues to be on Lasix 20 mg.  Blood pressures accepted at home with average reading 130/70.  No significant weight gain.  Kidney functions remain to be stable with GFR 19 and creatinine 2.3 improved from prior 3.  She was instructed to see vascular surgery for unhealed wound on her leg-per PCP.  She continues to feel tired and fatigued.  They both have been battling recent flu    Prior notes    Last visit July 2023. Here today with her . Since then, pt states she had a mechanical fall 3 months ago and fractured her R foot. 2 months ago, she has seen a urologist and was prescribed estradiol, abx and trospium. She had a cystoscopy and said it “looked good”. Has continued urinary frequency and incontinence, going every few hours with “slow flow”. Will see urology 1/2024.   Pt has felt headaches recently, along with feeling more tired and fatigued. States she is not sleeping well. Denies recent confusion.   BP this visit 160s/100s, does not check at home. Has continued swelling to legs and takes Lasix 20mg po daily. Continues with PO iron, discussed improved anemia.   Discussed again with pt how she is not a good candidate for fistula, plan for graft for HD. Will have close follow ups.       Per prior notes     Last office visit April 2023. In interim, she got admitted with leg cellulitis started antibiotic. She has been suffering from recurrent UTI and lower urinary tract symptoms with new  incontinence that is hard to treat. She finished 2-3 courses of antibiotic already. She was instructed to start the d-mannose per PCP. She is very frustrated because of the and urinary symptoms and she requested urology referral. Kidney function is stable. Not uremic or significantly hypervolemic on exam. Continues to have significant leg swelling which looks like lymphedema. She stopped taking oral iron as she was taking many pills at that time and she decided to quit. Discussed blood work results that showed iron deficiency anemia-she is willing to start p.o. iron. Offered starting infusion as needed. No significant albuminuria. Vitamin D is normal. Uric acid is normal-refilled febuxostat today     Her prior notes     Last office visit November 2022. In the interim she continues to have leg swelling (unilateral) concerning for DVT or cellulitis. We discussed our concerns with her today. We contacted PCP to update them. Most recent renal function stable at this time, with a serum creatinine 2.4, GFR 20 and BUN 29. She has completed an initial evaluation for AV fistula and decision for insertion of graft once the need for HD arrives. She states LE edema gets worse with food. We discussed the importance of a balanced diet, low in Na ( avoid hot dogs, noonan...) She agrees. She has lower urinary tract symptoms for which we will rule out UTI today. Although she is in good spirits, she expressed her decision not to pursue renal transplantation. Otherwise no new complaints.         Per prior notes  Last office visit April 2022. Patient came with her  today and is doing well. Was evaluated for a HD fistula but due to vasculature will need graft if/when dialysis is needed. Was sick last with with flu like symptoms but did not have a fever, was not tested for Covid-19. BP at home is in the 120s/70s but was running high today at the office. Leg swelling has slightly improved from prior. Patient is going to hold off on  knee surgery.      Per prior notes     Last office visit January 2022. Today, Magi came with her . She feels in her baseline state of health. She lost her son last month for COVID and she had to drive to [place]. She limited her fluid intake during the road trip. Next day she noticed decreased urine output. Urine output improved later and now in her baseline. She continues to follow low-salt diet. She did not need the dietitian as she supposed to. She is still deciding on the knee surgery. She did blood work 10 days ago and results were discussed with her and her  in detail today including slightly worsening serum creatinine and GFR, within normal potassium, low sodium 132. She reports occasional headache and she takes Tylenol. She has leg swelling. She is adherent to hydrochlorothiazide and rest of medications. Uric acid improved and now normal on febuxostat.  had many questions and all of them were discussed in details and answered. Overall, kidney function is slowly worsening and I anticipate starting dialysis in her lifetime. Blood pressure is elevated today     Per prior notes     Last office visit October 2021. In the interim, she continues to follow a low-salt diet. She started febuxostat for elevated uric acid with improvement in her knee pain. She did not see a dietitian and she work with one of her friends on healthier diet choices. Weight is stable. She still considering risks and benefits of knee replacement surgery and she is planned to see the orthopedic surgeon in the next few weeks or so. Blood pressure is under good control. No lower urine tract symptoms.  is concerned about limited diet options and not eating well. She agreed to start multivitamins. She reports better taste in the mouth that might be related to febuxostat. Weighing the risks versus benefits of continuing medication. Given her recommendation regarding mouthwash, chewing gums, ice cubes in the  mouth etc. Overall she is stable. I reviewed blood work and urinalysis with her in details including stable serum creatinine, GFR, improving albuminuria, normal vitamin D, significant improvement in uric acid after starting Uloric, stable mild anemia     Per prior notes     Last office visit August 2021. We discussed Advil and negative impact on kidneys. Instructed to hold Advil and follow conservative measures. In the interim, she continues to work on healthier diet choices. She does not check blood pressure at home. She could not tolerate allopurinol due to stomach issues. She is adherent to blood pressure medications but she missed the blood pressure medication yesterday. Blood pressure is elevated today. Repeat blood work showed stable poor GFR. Kidney ultrasound with renal atrophy. Magi came today with her . She reports occasional bilateral flank pain and she is concerned if she has UTI. She will get her booster Covid vaccine as well as flu shot soon. No NSAID use at home. She still has bad knee arthritis and she follows with orthopedic surgery. Recently she started using a walker which is helping her. No urinary tract symptoms, burning or pain with urination only occasional flank pain. No excessive leg swelling or nausea or vomiting. She successfully lost about 10 pounds since last visit        Per prior notes  Magi is coming today with her . He reports knowing about CKD 4 for few years. She recently was undergoing knee replacement therapy and she was asked to get renal clearance due to noticed worsening kidney function. She reports consuming 6 pills of 200 mg Advil daily (total dose of 1200 mg) for the last 5-6 years. Earlier, she had a history of GI bleed due to NSAID use and drop in hemoglobin for which she received a blood transfusion.     She reports making less urine recently. She is not a good water drinker. She does not limit salt intake. Recently she was instructed to lose weight  for surgery. She started following [employer] where she gets healthier low-calorie food at home in the last 2 months with appropriate weight loss about 10 pounds. She does not take any herbal supplements or use any chemicals to lose weight. She denies burning, pain or blood in urine. She has noticed that her urine has become thicker recently. No significant leg swelling. Blood pressure is in good control recently. No history of diabetes. She started oxybutynin recently for overactive bladder with significant improvement     Social:  and lives with her . She she used to work as a  in the [employer], no smoking or wine  Family history: 4 brothers with history of CABG, mother  of heart issues and CABG, sister with bladder cancer  at the age of 70, brother  with heart problems. No dialysis history     Review of Systems     Constitutional: +fatigue, no fever,~no chills,~no recent weight gain, weight loss  Eyes: no blurred vision~and~no diplopia.   ENT: no hearing loss,~no earache,~no sore throat,~no swollen glands in the neck~and~no nasal discharge.   Cardiovascular: no chest pain,~no palpitations~and~ lower extremity edema.   Respiratory: no shortness of breath,~and~no shortness of breath during exertion. , cough  Gastrointestinal: no diarrhea, but~no abdominal pain,~no constipation,~no heartburn,~no vomiting,~no bloody stools~and~no change in bowel movements.   Genitourinary: no dysuria~and~no hematuria.   Musculoskeletal: no arthralgias~and~no myalgias.   Skin: no rashes~and~no skin lesions.   Neurological:no dizziness.   Psychiatric: no confusion,~no depression~and~no anxiety.   Endocrine: no heat intolerance,~no cold intolerance,~appetite not increased,~no thyroid disorder,~no increased urinary frequency~and~no dry skin.   Hematologic/Lymphatic: does not bleed easily~and~does not bruise easily.   All other systems have been reviewed and are negative for complaint.       Objective   BP  "130/81   Ht 1.626 m (5' 4\")   Wt 95.7 kg (211 lb)   BMI 36.22 kg/m²   Wt Readings from Last 3 Encounters:   11/25/24 95.7 kg (211 lb)   09/19/24 98 kg (216 lb)   08/15/24 94.8 kg (209 lb)       Physical Exam    Virtual visit  General appearance: no distress awake and alert on room air, not significantly hypervolemic on exam  Eyes: non-icteric  HEENT: atrumatic head, PEERLA, moist mucosa  Skin: no apparent rash  Neuro: No FND,asterixis, no focal deficits noticed        Data Review        Results from last 7 days   Lab Units 11/19/24  0908   WBC AUTO x10*3/uL 4.7   HEMOGLOBIN g/dL 10.9*   HEMATOCRIT % 33.8*   PLATELETS AUTO x10*3/uL 158          Results from last 7 days   Lab Units 11/19/24  0908   SODIUM mmol/L 140   POTASSIUM mmol/L 3.6   CHLORIDE mmol/L 104   CO2 mmol/L 26   BUN mg/dL 39*   CREATININE mg/dL 3.37*   EGFR mL/min/1.73m*2 14*   GLUCOSE mg/dL 87   CALCIUM mg/dL 9.7   PHOSPHORUS mg/dL 3.4         Lab Results   Component Value Date    URICACID 4.7 08/08/2024       No components found for: \"SLVJHYB52MC\"      No results found for: \"HGBA1C\"      Lab Results   Component Value Date    CALCIUM 9.7 11/19/2024    PHOS 3.4 11/19/2024         No results found for requested labs within last 365 days.        Results from last 7 days   Lab Units 11/19/24  0908   SODIUM mmol/L 140   POTASSIUM mmol/L 3.6   CHLORIDE mmol/L 104   CO2 mmol/L 26   BUN mg/dL 39*   GLUCOSE mg/dL 87   CALCIUM mg/dL 9.7   ANION GAP mmol/L 14   EGFR mL/min/1.73m*2 14*               Albumin/Creatinine Ratio   Date Value Ref Range Status   11/19/2024 82.6 (H) <30.0 ug/mg Creat Final   08/08/2024   Final     Comment:     One or more analytes used in this calculation is outside of the analytical measurement range. Calculation cannot be performed.   05/29/2024 48.7 (H) <30.0 ug/mg Creat Final     RFP  Recent Labs     11/19/24  0908 08/08/24  1143 05/29/24  1137 04/25/24  0520 04/18/24  0558 04/13/24  0727 04/12/24  0756 04/11/24  0647 " 04/10/24  0650    139 135* 136 139 138 135* 136 135*   K 3.6 3.8 4.2 4.2 4.2 3.8 3.9 3.8 3.8    102 99 105 106 105 103 103 101   CO2 26 27 27 24 27 25 27 27 28   BUN 39* 36* 50* 33* 32* 31* 33* 32* 31*   CREATININE 3.37* 2.90* 3.21* 2.47* 2.60* 3.16* 3.34* 3.31* 3.48*   GLUCOSE 87 92 96 71* 76 82 83 80 81   CALCIUM 9.7 9.6 9.8 8.9 9.2 9.2 9.4 9.3 9.3   ALBUMIN 3.8 3.8 3.5  --   --  2.6* 2.8* 2.6* 2.7*   PHOS 3.4 3.3 3.3  --   --  3.2 3.2 3.1 3.0   EGFR 14* 16* 14* 20* 18* 15* 14* 14* 13*   ANIONGAP 14 14 13 11 10 12 9* 10 10        Urineanalysis  Recent Labs     10/19/23  0913 08/11/23  1251 06/28/23  1608 06/01/23  1211 04/13/23  1935 04/13/23  1418 08/23/21  0900 12/12/18  1220 11/10/18  2136   COLORU Yellow YELLOW Yellow STRAW YELLOW YELLOW YELLOW YELLOW YELLOW   APPEARANCEU Clear CLEAR Cloudy* CLEAR HAZY HAZY HAZY HAZY CLEAR   SPECGRAVU 1.020 1.010 1.010 1.006 1.009 1.010 1.006 1.015 1.010   STEVE 5.5 5.0 5.5 6.0 5.0 6.0 6.0 5.0 5.0   PROTUR NEGATIVE NEGATIVE NEGATIVE NEGATIVE NEGATIVE NEGATIVE 10  NEGATIVE NEGATIVE NEGATIVE   GLUCOSEU NEGATIVE NEGATIVE NEGATIVE NEGATIVE NEGATIVE NEGATIVE NEGATIVE NEGATIVE NEGATIVE   BLOODU TRACE-Intact* NEGATIVE NEGATIVE NEGATIVE SMALL(1+)* SMALL(1+)* SMALL(1+)* NEGATIVE NEGATIVE   KETONESU NEGATIVE NEGATIVE NEGATIVE NEGATIVE NEGATIVE NEGATIVE NEGATIVE NEGATIVE NEGATIVE   BILIRUBINU NEGATIVE NEGATIVE NEGATIVE NEGATIVE NEGATIVE NEGATIVE NEGATIVE NEGATIVE NEGATIVE   NITRITEU NEGATIVE POSITIVE* NEGATIVE NEGATIVE POSITIVE* POSITIVE* POSITIVE* NEGATIVE NEGATIVE   LEUKOCYTESU  --  SMALL (1+)*  --  MODERATE (2+)* LARGE(3+)* LARGE(3+)* LARGE(3+)* MODERATE(2+)* SMALL(1+)*       Urine Electrolytes  Recent Labs     11/19/24  0700 08/08/24  1143 05/29/24  1137 04/09/24  1728 04/08/24  0947 02/02/24  0530 11/04/23  1043 10/19/23  0913 08/11/23  1251 07/13/23  1120 06/28/23  1608 06/01/23  1211 04/13/23  1935 04/13/23  1418 04/15/22  0900 01/08/22  0941 10/11/21  1000  08/23/21  0900 12/12/18  1220 11/10/18  2136   SODIUMURR  --   --   --   --  77  --   --   --   --   --   --   --   --   --   --   --   --  55  --   --    NACREATUR  --   --   --   --  48  --   --   --   --   --   --   --   --   --   --   --   --  107  --   --    KUR  --   --   --   --  29  --   --   --   --   --   --   --   --   --   --   --   --  9  --   --    KCREATUR  --   --   --   --  18  --   --   --   --   --   --   --   --   --   --   --   --  17  --   --    CREATU 112.6 54.8 67.4 187.9 159.9 63.6 58.0  --   --  60.0  --   --   --  79.4 35.8 85.5 49.9 51.5  51.5  51.5  --   --    PROTUR  --   --   --   --   --   --   --  NEGATIVE NEGATIVE  --  NEGATIVE NEGATIVE NEGATIVE NEGATIVE  --   --   --  10  NEGATIVE NEGATIVE NEGATIVE   UTPCR  --   --   --   --   --   --   --   --   --   --   --   --   --   --   --   --   --  0.19*  --   --    ALBUMINUR 93.0 <7.0 32.8 52.3  --  16.1 14.1  --   --   --   --   --   --   --   --   --   --   --   --   --    MICROALBCREA 82.6*  --  48.7* 27.8  --  25.3 24.3  --   --  SEE COMMENT  --   --   --  12.2 SEE COMMENT SEE COMMENT 56.3* 53.4*  --   --         Urine Micro  Recent Labs     09/06/23  1314 08/11/23  1251 06/01/23  1211 04/13/23  1935 04/13/23  1418 08/23/21  0900 12/12/18  1220 11/10/18  2136   WBCU 22* 11* 18* 96* 43* >182* 33* 0-5   RBCU 1 <1 1 1 1 6* 4* NONE   HYALCASTU  --   --   --  OCC*  --   --  OCC*  --    SQUAMEPIU 1 <1 <1 2 5 2 4  --    BACTERIAU 1+* 1+* 1+* 1+* 1+* 2+* 1+* 2+*   MUCUSU  --  1+  --   --  FEW  --  FEW  --         Iron  Recent Labs     11/19/24  0908 08/08/24  1143 05/29/24  1137 02/02/24  1037 11/04/23  1043 07/13/23  1120 04/05/23  1007 11/03/22  1006 08/23/21  1036   IRON 74 74 48 85 85 60 59 81 95   TIBC 310 268 286 304 303 348 320 325 331   IRONSAT 24* 28 17* 28 28 17* 18* 25 29   FERRITIN 149 177* 168* 139 96 103 86 102 78       @Mg@    Lab Results   Component Value Date    WBC 4.7 11/19/2024    HGB 10.9 (L) 11/19/2024    HCT 33.8 (L)  11/19/2024    MCV 97 11/19/2024     11/19/2024       Lab Results   Component Value Date    CKTOTAL 25 08/23/2021    TROPONINI <0.02 11/10/2018       Albumin/Creatinine Ratio   Date Value Ref Range Status   11/19/2024 82.6 (H) <30.0 ug/mg Creat Final   08/08/2024   Final     Comment:     One or more analytes used in this calculation is outside of the analytical measurement range. Calculation cannot be performed.   05/29/2024 48.7 (H) <30.0 ug/mg Creat Final         Current Outpatient Medications:     atorvastatin (Lipitor) 20 mg tablet, Take 1 tablet (20 mg) by mouth once daily in the evening., Disp: 90 tablet, Rfl: 3    budesonide-formoteroL (Symbicort) 160-4.5 mcg/actuation inhaler, Inhale 2 puffs once daily., Disp: 10.2 g, Rfl: 3    febuxostat (Uloric) 40 mg tablet, TAKE ONE TABLET BY MOUTH DAILY, Disp: 90 tablet, Rfl: 0    ferrous gluconate 324 (38 Fe) mg tablet, Take 1 tablet (324 mg) by mouth 2 times a day., Disp: 180 tablet, Rfl: 3    furosemide (Lasix) 40 mg tablet, Take 1 tablet (40 mg) by mouth once daily., Disp: 90 tablet, Rfl: 3    levothyroxine (Synthroid, Levoxyl) 75 mcg tablet, Take 1 tablet (75 mcg) by mouth once daily in the morning., Disp: 90 tablet, Rfl: 3    trospium (Sanctura) 20 mg tablet, Take 3 tablets (60 mg) by mouth 2 times a day. (Patient not taking: Reported on 11/25/2024), Disp: , Rfl:        Assessment and Plan     Ms. Boyd is a 79-year-old white female with past medical history of hypertension, hyperlipidemia, knee osteoarthritis, hypothyroidism, overactive bladder, asthma, remote history of GI bleed, heavy NSAID use, asthma and CKD who is coming today for advanced CKD follow-up     Impression     # Chronic kidney disease -G4-->5/A1 -progressive  - Baseline SCr 3-3.5, GFR 10-15 mL/min per 1.73 m²  - Most likely related to atherosclerotic cardiovascular disease and significant history of NSAID use  -Most recent ACR 82 mg/g  -Electrolytes: Acceptable potassium, albumin and no  significant acidosis.  -Previous kidney U/S August 2021 showed shrunk right kidney 8.5 cm with increased echogenicity, left kidney 10.4 cm. No stones or masses  -Declined kidney transplant evaluation  -Not a good candidate for AV fistula. Arteriovenous graft as needed per vascular surgery, discussed this with pt.   -No immediate need to start dialysis.  Close follow-up.     #Urinary incontinence, new onset increased frequency   - preexisting, currently on trospium per urology.  -Oxybutynin was discontinued during last hospital admission April 2024 due to orthostatic hypotension     #BLE edema, possible lymphedema  - Will continue Lasix 40 mg daily     # Blood Pressure  - Today BP is 138/82 Home blood pressure checks average 130/60  - Current meds: Furosemide 40 mg qd  - Instructed to check blood pressure at home     #Anemia  - Hgb 10-11-asymptomatic, improved  - Iron saturation 28% improved from prior   - Ferritin elevated  - prior labs showed iron deficiency anemia  - Pt continues with PO iron BID.  -No indication for BERNADETTE     #BMD  - Calcium, phosphorus, albumin within reference values  -PTH minimally elevated at 108     # CVS-high risk  - On statins     #Uric Acid  - Previously unable to tolerate allopurinol due to GI issues.   - Doing well on febuxostat (started 10/2021), most recent uric acid 3-4 mg/dl  - Continue current regimen     General CKD recommendations:  - Avoid NSAIDS  - Avoid contrast as possible. If needed, hold ACEi/ARB/diuretic 24 hours prior to exposure, ensure appropriate hydration. For inpatient, consider IVF prior to and after  - Diet: limit salt, avoid processed foods  - For nausea/vomiting/ ulcer protection, prefer avoid PPI, H2 blocker, sucralfate okay.  - Tobacco cessation     Follow up 2-3 months with repeat blood work and urine analysis, iron studies, CBC, RFP prior.          Laura Narayan MD, MS, TATYANA ROSARIO  Clinical  - [facility] [employer]  Nephrologist - [facility]

## 2024-12-10 NOTE — PROGRESS NOTES
HISTORY OF PRESENT ILLNESS:  Magi Boyd is a 79 y.o. female who presents today for a virtual visit. She reports that over the last month she has had a pressure ulcer on her foot and has been seeing someone for that and on antibiotics. She reports that she started the medication, and it provided her with relief, but when it finished she developed symptoms again. She started her antibiotics 2 days ago, and has only noticed minimal improvement. She was taken off the trospium and put back on oxybutynin by her other doctor. She does not think the oxybutynin is not helping her. She reports that the urgency, frequency, and leakage do still bother her.          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), GERD (gastroesophageal reflux disease) (04/20/2023), Lower urinary tract symptoms (04/20/2023), Obesity, unspecified (08/01/2022), Otalgia, left ear (02/18/2020), Other abnormal glucose (09/29/2016), Other specified abnormal findings of blood chemistry (10/05/2016), Other specified disorders of ear, unspecified ear (03/18/2015), Pelvic and perineal pain (10/24/2019), Personal history of other diseases of the respiratory system (12/05/2013), Personal history of other diseases of the respiratory system (03/18/2015), Personal history of other diseases of the respiratory system (12/17/2019), Personal history of other drug therapy (10/25/2019), Personal history of other endocrine, nutritional and metabolic disease, Personal history of other specified conditions (09/26/2013), Personal history of other specified conditions (10/29/2013), Personal history of other specified  conditions (02/18/2020), Personal history of other specified conditions (02/18/2020), Personal history of other specified conditions (02/09/2015), Personal history of urinary (tract) infections (12/18/2018), Persons encountering health services in other specified circumstances (10/25/2019), Right knee pain (04/20/2023), Simple chronic bronchitis (CMS/HCC) (08/23/2023), SOB (shortness of breath) (08/23/2023), Toe pain (08/23/2023), Unspecified abnormal findings in urine (08/25/2021), Urinary tract infection, site not specified (10/18/2021), and Urinary tract infection, site not specified (08/30/2022).    Surgical History  She has no past surgical history on file.     Social History  She reports that she has never smoked. She has never used smokeless tobacco. She reports that she does not currently use alcohol. She reports that she does not use drugs.    Family History  Family History   Problem Relation Name Age of Onset    Heart disease Mother      Brain cancer Father      Lung cancer Father      Heart disease Brother      Heart disease Brother      Heart disease Brother      Heart disease Brother      Diabetes Other FAM HX         Allergies  Levofloxacin, Linezolid, Nitrofurantoin monohyd/m-cryst, and Penicillins      A comprehensive 10+ review of systems was negative except for: see hpi                                     Assessment:  78-year-old with recurrent UTIs and overactive bladder in the setting of stage IV kidney disease     Recurrent UTI :  Continue estradiol cream and she has completed fosfomycin  Standing culture ordered   cystoscopy was negative on 10/19/2020     OAB:  Failed oxybutynin and trospium  We discussed third-line therapy but she wants to try another medicine first    Rx for [facility]  Follow up in 3 months in office      Arsen Allan MD    Scribe Attestation  By signing my name below, ILuisana Scribe   attest that this documentation has been prepared under the direction and in the  presence of Arsen Allan MD.   show

## 2024-12-17 ENCOUNTER — APPOINTMENT (OUTPATIENT)
Dept: PODIATRY | Facility: CLINIC | Age: 79
End: 2024-12-17
Payer: MEDICARE

## 2024-12-17 DIAGNOSIS — I73.9 PAD (PERIPHERAL ARTERY DISEASE) (CMS-HCC): ICD-10-CM

## 2024-12-17 DIAGNOSIS — M10.00 IDIOPATHIC GOUT, UNSPECIFIED CHRONICITY, UNSPECIFIED SITE: Primary | ICD-10-CM

## 2024-12-17 DIAGNOSIS — B35.1 ONYCHOMYCOSIS: ICD-10-CM

## 2024-12-17 DIAGNOSIS — M79.671 PAIN IN BOTH FEET: Primary | ICD-10-CM

## 2024-12-17 DIAGNOSIS — M79.671 RIGHT FOOT PAIN: ICD-10-CM

## 2024-12-17 DIAGNOSIS — I73.9 PVD (PERIPHERAL VASCULAR DISEASE) (CMS-HCC): ICD-10-CM

## 2024-12-17 DIAGNOSIS — R60.9 EDEMA, UNSPECIFIED TYPE: ICD-10-CM

## 2024-12-17 DIAGNOSIS — M79.672 PAIN IN BOTH FEET: Primary | ICD-10-CM

## 2024-12-17 PROCEDURE — 1159F MED LIST DOCD IN RCRD: CPT | Performed by: PODIATRIST

## 2024-12-17 PROCEDURE — G2211 COMPLEX E/M VISIT ADD ON: HCPCS | Performed by: PODIATRIST

## 2024-12-17 PROCEDURE — 1123F ACP DISCUSS/DSCN MKR DOCD: CPT | Performed by: PODIATRIST

## 2024-12-17 PROCEDURE — 1160F RVW MEDS BY RX/DR IN RCRD: CPT | Performed by: PODIATRIST

## 2024-12-17 PROCEDURE — 99213 OFFICE O/P EST LOW 20 MIN: CPT | Performed by: PODIATRIST

## 2024-12-17 PROCEDURE — 1036F TOBACCO NON-USER: CPT | Performed by: PODIATRIST

## 2024-12-17 RX ORDER — FEBUXOSTAT 40 MG/1
40 TABLET, FILM COATED ORAL DAILY
Qty: 90 TABLET | Refills: 3 | Status: SHIPPED | OUTPATIENT
Start: 2024-12-17

## 2024-12-17 NOTE — PROGRESS NOTES
History of Present Illness:   This 79 year old female  Presents for follow up pain  States the pain is greatly improved to heel  Denies trauma  No other pedal complaints  Unable to safely trim nails on own    Past Medical History  Past Medical History:   Diagnosis Date    Acute UTI 04/20/2023    Adverse reaction to NSAIDs, sequela 04/20/2023    Benign essential hypertension 04/20/2023    Bilateral leg and foot pain 04/20/2023    Body mass index (BMI)40.0-44.9, adult 10/18/2021    Body mass index (BMI) of 40.0 to 44.9 in adult    CKD (chronic kidney disease) 04/20/2023    Disorder of both eustachian tubes 04/20/2023    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    GERD (gastroesophageal reflux disease) 04/20/2023    Lower urinary tract symptoms 04/20/2023    Obesity, unspecified 08/01/2022    Class 2 obesity with body mass index (BMI) of 39.0 to 39.9 in adult    Otalgia, left ear 02/18/2020    Earache, left    Other abnormal glucose 09/29/2016    Abnormal glucose    Other specified abnormal findings of blood chemistry 10/05/2016    Abnormal thyroid blood test    Other specified disorders of ear, unspecified ear 03/18/2015    Fullness in ear    Pelvic and perineal pain 10/24/2019    Pelvic pressure in female    Personal history of other diseases of the respiratory system 12/05/2013    History of acute bronchitis    Personal history of other diseases of the respiratory system 03/18/2015    History of acute sinusitis    Personal history of other diseases of the respiratory system 12/17/2019    History of upper respiratory infection    Personal history of other drug therapy 10/25/2019    History of influenza vaccination    Personal history of other endocrine, nutritional and metabolic disease     History of dehydration    Personal history of other specified conditions 09/26/2013    History of fatigue    Personal history of other specified conditions 10/29/2013    History of shortness of breath    Personal history of other  specified conditions 02/18/2020    History of nasal congestion    Personal history of other specified conditions 02/18/2020    History of postnasal drip    Personal history of other specified conditions 02/09/2015    History of diarrhea    Personal history of urinary (tract) infections 12/18/2018    History of urinary tract infection    Persons encountering health services in other specified circumstances 10/25/2019    Encounter to establish care    Right knee pain 04/20/2023    Simple chronic bronchitis (CMS/HCC) 08/23/2023    SOB (shortness of breath) 08/23/2023    Toe pain 08/23/2023    Unspecified abnormal findings in urine 08/25/2021    Abnormal urinalysis    Urinary tract infection, site not specified 10/18/2021    Acute lower UTI    Urinary tract infection, site not specified 08/30/2022    Acute UTI       Medications and Allergies have been reviewed.    Review Of Systems:  GENERAL: No weight loss, malaise or fevers.  HEENT: Negative for frequent or significant headaches,   RESPIRATORY: Negative for cough, wheezing or shortness of breath.  CARDIOVASCULAR: Negative for chest pain, leg swelling or palpitations.    Physical Exam:  Vascular exam: Dorsalis pedis and Posterior Tibial pulses palpable 2/4 bilateral. Capillary refill time less than 3 seconds b/l. Hair growth noted to digits. +2 b/l LE edema noted. Skin temp warm to warm from proximal to distal b/l. + varicosities noted.     Neurologic exam: Gross sensation present b/l. No neuro deficits noted b/l      Musculoskeletal exam: Muscle strength 5/5 for all major muscle groups tested in the lower extremity b/l. No gross deformities noted b/l. Pain to right plantar and post heel . NO Soi noted. However tender with palpation     Dermatologic exam:  Webspaces 1-4 b/l are clean, dry and intact.  Nails thick and discolored and elongated    1. Pain in both feet        2. Onychomycosis        3. PVD (peripheral vascular disease) (CMS-[facility])        4. Edema,  unspecified type        5. PAD (peripheral artery disease) (CMS-HCC)          Patient exam and eval  Pleased with progress  Continue with wound care plan  Continue to closely monitor heels  Debrided nails  Fu 3 mos  Sooner if any new prob arise.   Pt and hub in agreement to plan    Patient higher risk due to pedal infection she has had in past. This is a chronic and recurring condition    Kirstie Martino DPM  [phone]  [facility]  Fax: [phone]

## 2025-01-30 ENCOUNTER — APPOINTMENT (OUTPATIENT)
Dept: UROLOGY | Facility: CLINIC | Age: 80
End: 2025-01-30
Payer: MEDICARE

## 2025-01-30 DIAGNOSIS — N95.8 GENITOURINARY SYNDROME OF MENOPAUSE: Primary | ICD-10-CM

## 2025-01-30 DIAGNOSIS — N32.81 OAB (OVERACTIVE BLADDER): ICD-10-CM

## 2025-01-30 PROCEDURE — G2211 COMPLEX E/M VISIT ADD ON: HCPCS | Performed by: STUDENT IN AN ORGANIZED HEALTH CARE EDUCATION/TRAINING PROGRAM

## 2025-01-30 PROCEDURE — 99214 OFFICE O/P EST MOD 30 MIN: CPT | Performed by: STUDENT IN AN ORGANIZED HEALTH CARE EDUCATION/TRAINING PROGRAM

## 2025-01-30 PROCEDURE — 1123F ACP DISCUSS/DSCN MKR DOCD: CPT | Performed by: STUDENT IN AN ORGANIZED HEALTH CARE EDUCATION/TRAINING PROGRAM

## 2025-01-30 RX ORDER — OXYBUTYNIN CHLORIDE 5 MG/1
TABLET, EXTENDED RELEASE ORAL
Qty: 30 TABLET | Refills: 11 | Status: SHIPPED | OUTPATIENT
Start: 2025-01-30

## 2025-01-30 RX ORDER — ESTRADIOL 0.1 MG/G
CREAM VAGINAL
Qty: 42.5 G | Refills: 12 | Status: SHIPPED | OUTPATIENT
Start: 2025-01-30

## 2025-01-30 NOTE — PROGRESS NOTES
HISTORY OF PRESENT ILLNESS:  Magi Boyd is a 79 y.o. female who presents today for a follow up visit. She reports that she is leaking still. She asks if botox would be the next option.  She asks if she can try oxybutynin again. She also needs a refill for her estrogen cream.          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    Surgical History  She has no past surgical history on file.     Social History  She reports that she has never smoked. She has never used smokeless tobacco. She reports that she does not currently use alcohol. She reports that she does not use drugs.    Family History  Family History   Problem Relation Name Age of Onset    Heart disease Mother      Brain cancer Father      Lung cancer Father      Heart disease Brother      Heart disease Brother      Heart disease Brother      Heart disease Brother      Diabetes Other FAM HX         Allergies  Levofloxacin, Linezolid, Nitrofurantoin monohyd/m-cryst, and Penicillins      A comprehensive 10+ review of systems was negative except for: see hpi                          PHYSICAL EXAMINATION:  BP Readings from Last 3 Encounters:   11/25/24 130/81   09/19/24 125/82   08/15/24 138/82      Wt Readings from Last 3 Encounters:   11/25/24 95.7 kg (211 lb)   09/19/24 98 kg (216  "lb)   08/15/24 94.8 kg (209 lb)      BMI: Estimated body mass index is 36.22 kg/m² as calculated from the following:    Height as of 11/25/24: 1.626 m (5' 4\").    Weight as of 11/25/24: 95.7 kg (211 lb).  BSA: Estimated body surface area is 2.08 meters squared as calculated from the following:    Height as of 11/25/24: 1.626 m (5' 4\").    Weight as of 11/25/24: 95.7 kg (211 lb).  HEENT: Normocephalic, atraumatic, PER EOMI, nonicteric, trachea normal, thyroid normal, oropharynx normal.  CARDIAC: regular rate & rhythm, S1 & S2 normal.  No heaves, thrills, gallops or murmurs.  LUNGS: Clear to auscultation, no spinal or CV tenderness.  EXTREMITIES: No evidence of cyanosis, clubbing or edema.               Assessment:  79-year-old with recurrent UTIs and overactive bladder in the setting of stage IV kidney disease     Recurrent UTI :  Continue estradiol cream and she has completed fosfomycin  Standing culture ordered  cystoscopy was negative on 10/19/2020       OAB:  Failed oxybutynin and trospium, no improvement with vesicare  May need dialysis  Wants to see how she does with this   Can consider botox   Rx oxybutynin       Follow up in March        All questions and concerns were answered and addressed.  The patient expressed understanding and agrees with the plan.     Arsen Allan MD    Scribe Attestation  By signing my name below, I, Roya Almonte   attest that this documentation has been prepared under the direction and in the presence of Arsen Allan MD.  "

## 2025-02-04 ENCOUNTER — TELEPHONE (OUTPATIENT)
Dept: PRIMARY CARE | Facility: CLINIC | Age: 80
End: 2025-02-04
Payer: MEDICARE

## 2025-02-04 DIAGNOSIS — J45.909 ASTHMA, UNSPECIFIED ASTHMA SEVERITY, UNSPECIFIED WHETHER COMPLICATED, UNSPECIFIED WHETHER PERSISTENT (HHS-HCC): Primary | ICD-10-CM

## 2025-02-04 RX ORDER — ALBUTEROL SULFATE 90 UG/1
2 INHALANT RESPIRATORY (INHALATION) EVERY 4 HOURS PRN
Qty: 8.5 G | Refills: 5 | Status: SHIPPED | OUTPATIENT
Start: 2025-02-04 | End: 2026-02-04

## 2025-02-13 ENCOUNTER — PATIENT OUTREACH (OUTPATIENT)
Dept: CARE COORDINATION | Age: 80
End: 2025-02-13
Payer: MEDICARE

## 2025-02-19 ENCOUNTER — PATIENT OUTREACH (OUTPATIENT)
Dept: CARE COORDINATION | Age: 80
End: 2025-02-19
Payer: MEDICARE

## 2025-02-23 LAB
25(OH)D3+25(OH)D2 SERPL-MCNC: 54 NG/ML (ref 30–100)
ALBUMIN SERPL-MCNC: 4.1 G/DL (ref 3.6–5.1)
ALBUMIN/CREAT UR: NORMAL
BUN SERPL-MCNC: 35 MG/DL (ref 7–25)
BUN/CREAT SERPL: 14 (CALC) (ref 6–22)
CALCIUM SERPL-MCNC: 9.9 MG/DL (ref 8.6–10.4)
CHLORIDE SERPL-SCNC: 104 MMOL/L (ref 98–110)
CO2 SERPL-SCNC: 26 MMOL/L (ref 20–32)
CREAT SERPL-MCNC: 2.48 MG/DL (ref 0.6–1)
CREAT UR-MCNC: NORMAL MG/DL
EGFRCR SERPLBLD CKD-EPI 2021: 19 ML/MIN/1.73M2
ERYTHROCYTE [DISTWIDTH] IN BLOOD BY AUTOMATED COUNT: 12.7 % (ref 11–15)
FERRITIN SERPL-MCNC: 100 NG/ML (ref 16–288)
GLUCOSE SERPL-MCNC: 95 MG/DL (ref 65–99)
HCT VFR BLD AUTO: 38.2 % (ref 35–45)
HGB BLD-MCNC: 12.1 G/DL (ref 11.7–15.5)
IRON SATN MFR SERPL: 29 % (CALC) (ref 16–45)
IRON SERPL-MCNC: 82 MCG/DL (ref 45–160)
MCH RBC QN AUTO: 30.6 PG (ref 27–33)
MCHC RBC AUTO-ENTMCNC: 31.7 G/DL (ref 32–36)
MCV RBC AUTO: 96.5 FL (ref 80–100)
MICROALBUMIN UR-MCNC: NORMAL
PHOSPHATE SERPL-MCNC: 2.8 MG/DL (ref 2.1–4.3)
PLATELET # BLD AUTO: 170 THOUSAND/UL (ref 140–400)
PMV BLD REES-ECKER: 10.2 FL (ref 7.5–12.5)
POTASSIUM SERPL-SCNC: 3.8 MMOL/L (ref 3.5–5.3)
PTH-INTACT SERPL-MCNC: 156 PG/ML (ref 16–77)
RBC # BLD AUTO: 3.96 MILLION/UL (ref 3.8–5.1)
SODIUM SERPL-SCNC: 140 MMOL/L (ref 135–146)
TIBC SERPL-MCNC: 287 MCG/DL (CALC) (ref 250–450)
WBC # BLD AUTO: 6.3 THOUSAND/UL (ref 3.8–10.8)

## 2025-02-24 ENCOUNTER — PATIENT OUTREACH (OUTPATIENT)
Dept: CARE COORDINATION | Age: 80
End: 2025-02-24
Payer: MEDICARE

## 2025-02-24 LAB
25(OH)D3+25(OH)D2 SERPL-MCNC: 54 NG/ML (ref 30–100)
ALBUMIN SERPL-MCNC: 4.1 G/DL (ref 3.6–5.1)
ALBUMIN/CREAT UR: 64 MG/G CREAT
BUN SERPL-MCNC: 35 MG/DL (ref 7–25)
BUN/CREAT SERPL: 14 (CALC) (ref 6–22)
CALCIUM SERPL-MCNC: 9.9 MG/DL (ref 8.6–10.4)
CHLORIDE SERPL-SCNC: 104 MMOL/L (ref 98–110)
CO2 SERPL-SCNC: 26 MMOL/L (ref 20–32)
CREAT SERPL-MCNC: 2.48 MG/DL (ref 0.6–1)
CREAT UR-MCNC: 87 MG/DL (ref 20–275)
EGFRCR SERPLBLD CKD-EPI 2021: 19 ML/MIN/1.73M2
ERYTHROCYTE [DISTWIDTH] IN BLOOD BY AUTOMATED COUNT: 12.7 % (ref 11–15)
FERRITIN SERPL-MCNC: 100 NG/ML (ref 16–288)
GLUCOSE SERPL-MCNC: 95 MG/DL (ref 65–99)
HCT VFR BLD AUTO: 38.2 % (ref 35–45)
HGB BLD-MCNC: 12.1 G/DL (ref 11.7–15.5)
IRON SATN MFR SERPL: 29 % (CALC) (ref 16–45)
IRON SERPL-MCNC: 82 MCG/DL (ref 45–160)
MCH RBC QN AUTO: 30.6 PG (ref 27–33)
MCHC RBC AUTO-ENTMCNC: 31.7 G/DL (ref 32–36)
MCV RBC AUTO: 96.5 FL (ref 80–100)
MICROALBUMIN UR-MCNC: 5.6 MG/DL
PHOSPHATE SERPL-MCNC: 2.8 MG/DL (ref 2.1–4.3)
PLATELET # BLD AUTO: 170 THOUSAND/UL (ref 140–400)
PMV BLD REES-ECKER: 10.2 FL (ref 7.5–12.5)
POTASSIUM SERPL-SCNC: 3.8 MMOL/L (ref 3.5–5.3)
PTH-INTACT SERPL-MCNC: 156 PG/ML (ref 16–77)
RBC # BLD AUTO: 3.96 MILLION/UL (ref 3.8–5.1)
SODIUM SERPL-SCNC: 140 MMOL/L (ref 135–146)
TIBC SERPL-MCNC: 287 MCG/DL (CALC) (ref 250–450)
WBC # BLD AUTO: 6.3 THOUSAND/UL (ref 3.8–10.8)

## 2025-02-25 ENCOUNTER — OFFICE VISIT (OUTPATIENT)
Dept: NEPHROLOGY | Facility: CLINIC | Age: 80
End: 2025-02-25
Payer: MEDICARE

## 2025-02-25 VITALS
OXYGEN SATURATION: 92 % | HEART RATE: 104 BPM | HEIGHT: 64 IN | BODY MASS INDEX: 36.7 KG/M2 | DIASTOLIC BLOOD PRESSURE: 83 MMHG | SYSTOLIC BLOOD PRESSURE: 145 MMHG | WEIGHT: 215 LBS | TEMPERATURE: 96.4 F

## 2025-02-25 DIAGNOSIS — N18.4 STAGE 4 CHRONIC KIDNEY DISEASE (MULTI): Primary | ICD-10-CM

## 2025-02-25 DIAGNOSIS — R60.0 LOCALIZED EDEMA: ICD-10-CM

## 2025-02-25 DIAGNOSIS — M10.00 IDIOPATHIC GOUT, UNSPECIFIED CHRONICITY, UNSPECIFIED SITE: ICD-10-CM

## 2025-02-25 DIAGNOSIS — N18.4 BENIGN HYPERTENSION WITH CKD (CHRONIC KIDNEY DISEASE) STAGE IV (MULTI): ICD-10-CM

## 2025-02-25 DIAGNOSIS — N18.5 CKD (CHRONIC KIDNEY DISEASE), STAGE V (MULTI): ICD-10-CM

## 2025-02-25 DIAGNOSIS — I89.0 LYMPHEDEMA: ICD-10-CM

## 2025-02-25 DIAGNOSIS — I12.9 BENIGN HYPERTENSION WITH CKD (CHRONIC KIDNEY DISEASE) STAGE IV (MULTI): ICD-10-CM

## 2025-02-25 PROCEDURE — 1159F MED LIST DOCD IN RCRD: CPT | Performed by: INTERNAL MEDICINE

## 2025-02-25 PROCEDURE — 3075F SYST BP GE 130 - 139MM HG: CPT | Performed by: INTERNAL MEDICINE

## 2025-02-25 PROCEDURE — G2211 COMPLEX E/M VISIT ADD ON: HCPCS | Performed by: INTERNAL MEDICINE

## 2025-02-25 PROCEDURE — 99215 OFFICE O/P EST HI 40 MIN: CPT | Performed by: INTERNAL MEDICINE

## 2025-02-25 PROCEDURE — 3079F DIAST BP 80-89 MM HG: CPT | Performed by: INTERNAL MEDICINE

## 2025-02-25 PROCEDURE — 1123F ACP DISCUSS/DSCN MKR DOCD: CPT | Performed by: INTERNAL MEDICINE

## 2025-02-25 NOTE — PROGRESS NOTES
"Subjective     Ms. Boyd is a 80-year-old white female with past medical history of hypertension, hyperlipidemia, knee osteoarthritis, hypothyroidism, overactive bladder, asthma, remote history of GI bleed, heavy NSAID use, asthma and CKD who is coming today for advanced CKD follow-up.    Last office visit November 2024.  Magi came today with her  \"[place] \".  She still battling upper respiratory tract infection/sinusitis.  No kidney recent complaints or concerns.  Good urine output.  She continues to follow with urology with plans for possible Botox injection.  Blood pressure is accepted.  Repeat blood work showed improved serum creatinine 2.4 from prior 3.3, GFR improved 19 from prior 14.  Normal electrolytes.  Within normal potassium.  No significant acidosis.  Improved anemia hemoglobin 12.1.  Improved iron storage.  No significant protein leak in the urine with spot test ACR 64.  Normal vitamin D.  She continues to nap during the day and having difficulties falling asleep at night.  She follows low-salt diet.  Overall, Magi is doing well.  No indication to start dialysis just yet    Prior notes    Last office visit August 2024.  Magi was evaluated virtually today.  She continues to have sinusitis-currently not on antibiotics.  She reports napping in the daytime and irregular sleep at night.  No kidney related complaints or concerns.  No significant weight gain.  Continues to produce good amount of urine.  No uremic symptoms.  No significant hypervolemia.  Repeat blood work showed stable serum creatinine 3.3 and GFR 14 consistent with prior baseline.  With normal extra lites.  Stable anemia hemoglobin 10.9-continues to be on iron supplements.  Today we discussed advanced CKD and stable course.  No indication to initiate dialysis yet.  Will continue to monitor closely    Prior notes    Magi comes in today with her . GFR and Scr improved over the past couple months. Informs us that her " BP decreased last month since she lost 50 pounds in the last year. Atenolol was discontinued. Needs refill for Lasix today. Denies any blurry vision, headache, confusion. Diagnosed with UTI in June, treated with Bactrim. No new urinary symptoms.       June 2024:  Last office visit February 2024.  Patient came today with her .  In the interim, she had hospital admission in April 2024 with CHF, wound infection treated with cefazolin and she developed acute kidney injury top of chronic kidney disease.  Today, patient is awake and alert.  Not uremic or significant hypervolemic on exam.  She continues to be on Lasix 40 mg.  Leg swelling is stable and actually improved from prior.  She continues to produce urine.  She continues to have frequency of urination.  She follows with urology.  Blood pressures accepted.  Home blood pressure log was reviewed and showed 110-130/70-80 and target.  No hyperkalemia in most recent blood work.  No significant acidosis.  Overall she is compensated with no immediate need to start dialysis.  I went over uremic/hypervolemic symptoms with wife and  today that might mandate initiation of dialysis      Prior notes    Last office visit November 2023.  Patient came today with her .  Continues to have significant leg swelling.  Reports slowing down her urine output was urine frequency at night.  She continues to be on Lasix 20 mg.  Blood pressures accepted at home with average reading 130/70.  No significant weight gain.  Kidney functions remain to be stable with GFR 19 and creatinine 2.3 improved from prior 3.  She was instructed to see vascular surgery for unhealed wound on her leg-per PCP.  She continues to feel tired and fatigued.  They both have been battling recent flu    Prior notes    Last visit July 2023. Here today with her . Since then, pt states she had a mechanical fall 3 months ago and fractured her R foot. 2 months ago, she has seen a urologist and was  prescribed estradiol, abx and trospium. She had a cystoscopy and said it “looked good”. Has continued urinary frequency and incontinence, going every few hours with “slow flow”. Will see urology 1/2024.   Pt has felt headaches recently, along with feeling more tired and fatigued. States she is not sleeping well. Denies recent confusion.   BP this visit 160s/100s, does not check at home. Has continued swelling to legs and takes Lasix 20mg po daily. Continues with PO iron, discussed improved anemia.   Discussed again with pt how she is not a good candidate for fistula, plan for graft for HD. Will have close follow ups.       Per prior notes     Last office visit April 2023. In interim, she got admitted with leg cellulitis started antibiotic. She has been suffering from recurrent UTI and lower urinary tract symptoms with new incontinence that is hard to treat. She finished 2-3 courses of antibiotic already. She was instructed to start the d-mannose per PCP. She is very frustrated because of the and urinary symptoms and she requested urology referral. Kidney function is stable. Not uremic or significantly hypervolemic on exam. Continues to have significant leg swelling which looks like lymphedema. She stopped taking oral iron as she was taking many pills at that time and she decided to quit. Discussed blood work results that showed iron deficiency anemia-she is willing to start p.o. iron. Offered starting infusion as needed. No significant albuminuria. Vitamin D is normal. Uric acid is normal-refilled febuxostat today     Her prior notes     Last office visit November 2022. In the interim she continues to have leg swelling (unilateral) concerning for DVT or cellulitis. We discussed our concerns with her today. We contacted PCP to update them. Most recent renal function stable at this time, with a serum creatinine 2.4, GFR 20 and BUN 29. She has completed an initial evaluation for AV fistula and decision for insertion of  graft once the need for HD arrives. She states LE edema gets worse with food. We discussed the importance of a balanced diet, low in Na ( avoid hot dogs, noonan...) She agrees. She has lower urinary tract symptoms for which we will rule out UTI today. Although she is in good spirits, she expressed her decision not to pursue renal transplantation. Otherwise no new complaints.         Per prior notes  Last office visit April 2022. Patient came with her  today and is doing well. Was evaluated for a HD fistula but due to vasculature will need graft if/when dialysis is needed. Was sick last with with flu like symptoms but did not have a fever, was not tested for Covid-19. BP at home is in the 120s/70s but was running high today at the office. Leg swelling has slightly improved from prior. Patient is going to hold off on knee surgery.      Per prior notes     Last office visit January 2022. Today, Magi came with her . She feels in her baseline state of health. She lost her son last month for [employer] and she had to drive to [place]. She limited her fluid intake during the road trip. Next day she noticed decreased urine output. Urine output improved later and now in her baseline. She continues to follow low-salt diet. She did not need the dietitian as she supposed to. She is still deciding on the knee surgery. She did blood work 10 days ago and results were discussed with her and her  in detail today including slightly worsening serum creatinine and GFR, within normal potassium, low sodium 132. She reports occasional headache and she takes Tylenol. She has leg swelling. She is adherent to hydrochlorothiazide and rest of medications. Uric acid improved and now normal on febuxostat.  had many questions and all of them were discussed in details and answered. Overall, kidney function is slowly worsening and I anticipate starting dialysis in her lifetime. Blood pressure is elevated today     Per  prior notes     Last office visit October 2021. In the interim, she continues to follow a low-salt diet. She started febuxostat for elevated uric acid with improvement in her knee pain. She did not see a dietitian and she work with one of her friends on healthier diet choices. Weight is stable. She still considering risks and benefits of knee replacement surgery and she is planned to see the orthopedic surgeon in the next few weeks or so. Blood pressure is under good control. No lower urine tract symptoms.  is concerned about limited diet options and not eating well. She agreed to start multivitamins. She reports better taste in the mouth that might be related to febuxostat. Weighing the risks versus benefits of continuing medication. Given her recommendation regarding mouthwash, chewing gums, ice cubes in the mouth etc. Overall she is stable. I reviewed blood work and urinalysis with her in details including stable serum creatinine, GFR, improving albuminuria, normal vitamin D, significant improvement in uric acid after starting Uloric, stable mild anemia     Per prior notes     Last office visit August 2021. We discussed Advil and negative impact on kidneys. Instructed to hold Advil and follow conservative measures. In the interim, she continues to work on healthier diet choices. She does not check blood pressure at home. She could not tolerate allopurinol due to stomach issues. She is adherent to blood pressure medications but she missed the blood pressure medication yesterday. Blood pressure is elevated today. Repeat blood work showed stable poor GFR. Kidney ultrasound with renal atrophy. Magi came today with her . She reports occasional bilateral flank pain and she is concerned if she has UTI. She will get her booster Covid vaccine as well as flu shot soon. No NSAID use at home. She still has bad knee arthritis and she follows with orthopedic surgery. Recently she started using a walker which  is helping her. No urinary tract symptoms, burning or pain with urination only occasional flank pain. No excessive leg swelling or nausea or vomiting. She successfully lost about 10 pounds since last visit        Per prior notes  Magi is coming today with her . He reports knowing about CKD 4 for few years. She recently was undergoing knee replacement therapy and she was asked to get renal clearance due to noticed worsening kidney function. She reports consuming 6 pills of 200 mg Advil daily (total dose of 1200 mg) for the last 5-6 years. Earlier, she had a history of GI bleed due to NSAID use and drop in hemoglobin for which she received a blood transfusion.     She reports making less urine recently. She is not a good water drinker. She does not limit salt intake. Recently she was instructed to lose weight for surgery. She started following [employer] where she gets healthier low-calorie food at home in the last 2 months with appropriate weight loss about 10 pounds. She does not take any herbal supplements or use any chemicals to lose weight. She denies burning, pain or blood in urine. She has noticed that her urine has become thicker recently. No significant leg swelling. Blood pressure is in good control recently. No history of diabetes. She started oxybutynin recently for overactive bladder with significant improvement     Social:  and lives with her . She she used to work as a  in the [employer], no smoking or wine  Family history: 4 brothers with history of CABG, mother  of heart issues and CABG, sister with bladder cancer  at the age of 70, brother  with heart problems. No dialysis history     Review of Systems     Constitutional: +fatigue, no fever,~no chills,~no recent weight gain  Eyes: no blurred vision~and~no diplopia.   ENT: no hearing loss,~no earache,~no sore throat,~no swollen glands in the neck~and~no nasal discharge.   Cardiovascular: no chest pain,~no  "palpitations~and~ lower extremity edema.   Respiratory: no shortness of breath,~and~no shortness of breath during exertion. , cough  Gastrointestinal: no diarrhea, but~no abdominal pain,~no constipation,~no heartburn,~no vomiting,~no bloody stools~and~no change in bowel movements.   Genitourinary: no dysuria~and~no hematuria.   Musculoskeletal: no arthralgias~and~no myalgias.   Skin: no rashes~and~no skin lesions.   Neurological:no dizziness.   Psychiatric: no confusion,~no depression~and~no anxiety.   Endocrine: no heat intolerance,~no cold intolerance,~appetite not increased,~no thyroid disorder,~no increased urinary frequency~and~no dry skin.   Hematologic/Lymphatic: does not bleed easily~and~does not bruise easily.   All other systems have been reviewed and are negative for complaint.       Objective   /83 (BP Location: Right arm, Patient Position: Standing, BP Cuff Size: Large adult)   Pulse 104   Temp 35.8 °C (96.4 °F)   Ht 1.626 m (5' 4\")   Wt 97.5 kg (215 lb)   SpO2 92%   BMI 36.90 kg/m²   Wt Readings from Last 3 Encounters:   02/25/25 97.5 kg (215 lb)   11/25/24 95.7 kg (211 lb)   09/19/24 98 kg (216 lb)       Physical Exam    Virtual visit  General appearance: no distress awake and alert on room air, not significantly hypervolemic on exam, obese  Eyes: non-icteric  HEENT: atrumatic head, PEERLA, moist mucosa  Skin: no apparent rash  Neuro: No FND,asterixis, no focal deficits noticed        Data Review        Results from last 7 days   Lab Units 02/22/25  1048   [facility] WBC AUTO Thousand/uL 6.3   [facility] HEMOGLOBIN g/dL 12.1   [facility] HEMATOCRIT % 38.2   [facility] PLATELETS AUTO Thousand/uL 170          Results from last 7 days   Lab Units 02/22/25  1048   [facility] SODIUM mmol/L 140   [facility] POTASSIUM mmol/L 3.8   [facility] CHLORIDE mmol/L 104   [facility] CO2 mmol/L 26   [facility] BUN mg/dL 35*   [facility] CREATININE mg/dL 2.48*   QUEST EGFR mL/min/1.73m2 19*   [facility] GLUCOSE mg/dL 95   [facility] CALCIUM mg/dL 9.9   QUEST " "PHOSPHORUS mg/dL 2.8         Lab Results   Component Value Date    URICACID 4.7 08/08/2024       No components found for: \"[id number]\"      No results found for: \"HGBA1C\"      Lab Results   Component Value Date    CALCIUM 9.9 02/22/2025    PHOS 2.8 02/22/2025         No results found for requested labs within last 365 days.        Results from last 7 days   Lab Units 02/22/25  1048   [facility] SODIUM mmol/L 140   [facility] POTASSIUM mmol/L 3.8   [facility] CHLORIDE mmol/L 104   [facility] CO2 mmol/L 26   [facility] BUN mg/dL 35*   QUEST GLUCOSE mg/dL 95   QUEST CALCIUM mg/dL 9.9   QUEST EGFR mL/min/1.73m2 19*               ALBUMIN/CREATININE RATIO, RANDOM URINE   Date Value Ref Range Status   02/22/2025 64 (H) <30 mg/g creat Final     Comment:        The ADA defines abnormalities in albumin  excretion as follows:     Albuminuria Category        Result (mg/g creatinine)     Normal to Mildly increased   <30  Moderately increased            Severely increased           > OR = 300     The ADA recommends that at least two of three  specimens collected within a 3-6 month period be  abnormal before considering a patient to be  within a diagnostic category.       Albumin/Creatinine Ratio   Date Value Ref Range Status   11/19/2024 82.6 (H) <30.0 ug/mg Creat Final   08/08/2024   Final     Comment:     One or more analytes used in this calculation is outside of the analytical measurement range. Calculation cannot be performed.     RFP  Recent Labs     02/22/25  1048 11/19/24  0908 08/08/24  1143 05/29/24  1137 04/25/24  0520 04/18/24  0558 04/13/24  0727 04/12/24  0756 04/11/24  0647    140 139 135* 136 139 138 135* 136   K 3.8 3.6 3.8 4.2 4.2 4.2 3.8 3.9 3.8    104 102 99 105 106 105 103 103   CO2 26 26 27 27 24 27 25 27 27   BUN 35* 39* 36* 50* 33* 32* 31* 33* 32*   CREATININE 2.48* 3.37* 2.90* 3.21* 2.47* 2.60* 3.16* 3.34* 3.31*   GLUCOSE 95 87 92 96 71* 76 82 83 80   CALCIUM 9.9 9.7 9.6 9.8 8.9 9.2 9.2 9.4 9.3   ALBUMIN 4.1 " 3.8 3.8 3.5  --   --  2.6* 2.8* 2.6*   PHOS 2.8 3.4 3.3 3.3  --   --  3.2 3.2 3.1   EGFR 19* 14* 16* 14* 20* 18* 15* 14* 14*   ANIONGAP  --  14 14 13 11 10 12 9* 10        Urineanalysis  Recent Labs     10/19/23  0913 08/11/23  1251 06/28/23  1608 06/01/23  1211 04/13/23  1935 04/13/23  1418 08/23/21  0900 12/12/18  1220 11/10/18  2136   COLORU Yellow YELLOW Yellow STRAW YELLOW YELLOW YELLOW YELLOW YELLOW   APPEARANCEU Clear CLEAR Cloudy* CLEAR HAZY HAZY HAZY HAZY CLEAR   SPECGRAVU 1.020 1.010 1.010 1.006 1.009 1.010 1.006 1.015 1.010   STEVE 5.5 5.0 5.5 6.0 5.0 6.0 6.0 5.0 5.0   PROTUR NEGATIVE NEGATIVE NEGATIVE NEGATIVE NEGATIVE NEGATIVE 10  NEGATIVE NEGATIVE NEGATIVE   GLUCOSEU NEGATIVE NEGATIVE NEGATIVE NEGATIVE NEGATIVE NEGATIVE NEGATIVE NEGATIVE NEGATIVE   BLOODU TRACE-Intact* NEGATIVE NEGATIVE NEGATIVE SMALL(1+)* SMALL(1+)* SMALL(1+)* NEGATIVE NEGATIVE   KETONESU NEGATIVE NEGATIVE NEGATIVE NEGATIVE NEGATIVE NEGATIVE NEGATIVE NEGATIVE NEGATIVE   BILIRUBINU NEGATIVE NEGATIVE NEGATIVE NEGATIVE NEGATIVE NEGATIVE NEGATIVE NEGATIVE NEGATIVE   NITRITEU NEGATIVE POSITIVE* NEGATIVE NEGATIVE POSITIVE* POSITIVE* POSITIVE* NEGATIVE NEGATIVE   LEUKOCYTESU  --  SMALL (1+)*  --  MODERATE (2+)* LARGE(3+)* LARGE(3+)* LARGE(3+)* MODERATE(2+)* SMALL(1+)*       Urine Electrolytes  Recent Labs     02/22/25  1048 11/19/24  0700 08/08/24  1143 05/29/24  1137 04/09/24  1728 04/08/24  0947 02/02/24  0530 11/04/23  1043 10/19/23  0913 08/11/23  1251 07/13/23  1120 06/28/23  1608 06/01/23  1211 04/13/23  1935 04/13/23  1418 04/15/22  0900 01/08/22  0941 10/11/21  1000 08/23/21  0900 12/12/18  1220 11/10/18  9496   SODIUMURR  --   --   --   --   --  77  --   --   --   --   --   --   --   --   --   --   --   --  55  --   --    NACREATUR  --   --   --   --   --  48  --   --   --   --   --   --   --   --   --   --   --   --  107  --   --    KUR  --   --   --   --   --  29  --   --   --   --   --   --   --   --   --   --   --   --  9  --    --    KCREATUR  --   --   --   --   --  18  --   --   --   --   --   --   --   --   --   --   --   --  17  --   --    CREATU 87 112.6 54.8 67.4 187.9 159.9 63.6 58.0  --   --  60.0  --   --   --  79.4 35.8 85.5 49.9 51.5  51.5  51.5  --   --    PROTUR  --   --   --   --   --   --   --   --  NEGATIVE NEGATIVE  --  NEGATIVE NEGATIVE NEGATIVE NEGATIVE  --   --   --  10  NEGATIVE NEGATIVE NEGATIVE   UTPCR  --   --   --   --   --   --   --   --   --   --   --   --   --   --   --   --   --   --  0.19*  --   --    ALBUMINUR 5.6 93.0 <7.0 32.8 52.3  --  16.1 14.1  --   --   --   --   --   --   --   --   --   --   --   --   --    MICROALBCREA 64* 82.6*  --  48.7* 27.8  --  25.3 24.3  --   --  SEE COMMENT  --   --   --  12.2 SEE COMMENT SEE COMMENT 56.3* 53.4*  --   --         Urine Micro  Recent Labs     09/06/23  1314 08/11/23  1251 06/01/23  1211 04/13/23  1935 04/13/23  1418 08/23/21  0900 12/12/18  1220 11/10/18  2136   WBCU 22* 11* 18* 96* 43* >182* 33* 0-5   RBCU 1 <1 1 1 1 6* 4* NONE   HYALCASTU  --   --   --  OCC*  --   --  OCC*  --    SQUAMEPIU 1 <1 <1 2 5 2 4  --    BACTERIAU 1+* 1+* 1+* 1+* 1+* 2+* 1+* 2+*   MUCUSU  --  1+  --   --  FEW  --  FEW  --         Iron  Recent Labs     02/22/25  1048 11/19/24  0908 08/08/24  1143 05/29/24  1137 02/02/24  1037 11/04/23  1043 07/13/23  1120 04/05/23  1007 11/03/22  1006 08/23/21  1036   IRON 82 74 74 48 85 85 60 59 81 95   TIBC 287 310 268 286 304 303 348 320 325 331   IRONSAT 29 24* 28 17* 28 28 17* 18* 25 29   FERRITIN 100 149 177* 168* 139 96 103 86 102 78       @Mg@    Lab Results   Component Value Date    WBC 6.3 02/22/2025    HGB 12.1 02/22/2025    HCT 38.2 02/22/2025    MCV 96.5 02/22/2025     02/22/2025       Lab Results   Component Value Date    CKTOTAL 25 08/23/2021    TROPONINI <0.02 11/10/2018       ALBUMIN/CREATININE RATIO, RANDOM URINE   Date Value Ref Range Status   02/22/2025 64 (H) <30 mg/g creat Final     Comment:        The ADA defines  abnormalities in albumin  excretion as follows:     Albuminuria Category        Result (mg/g creatinine)     Normal to Mildly increased   <30  Moderately increased            Severely increased           > OR = 300     The ADA recommends that at least two of three  specimens collected within a 3-6 month period be  abnormal before considering a patient to be  within a diagnostic category.       Albumin/Creatinine Ratio   Date Value Ref Range Status   11/19/2024 82.6 (H) <30.0 ug/mg Creat Final   08/08/2024   Final     Comment:     One or more analytes used in this calculation is outside of the analytical measurement range. Calculation cannot be performed.         Current Outpatient Medications:     albuterol (ProAir HFA) 90 mcg/actuation inhaler, Inhale 2 puffs every 4 hours if needed for wheezing or shortness of breath., Disp: 8.5 g, Rfl: 5    atorvastatin (Lipitor) 20 mg tablet, Take 1 tablet (20 mg) by mouth once daily in the evening., Disp: 90 tablet, Rfl: 3    budesonide-formoteroL (Symbicort) 160-4.5 mcg/actuation inhaler, Inhale 2 puffs once daily., Disp: 10.2 g, Rfl: 3    estradiol (Estrace) 0.01 % (0.1 mg/gram) vaginal cream, Insert pea size amount into vagina every night for 2 weeks, then 2x/week after, Disp: 42.5 g, Rfl: 12    febuxostat (Uloric) 40 mg tablet, Take 1 tablet (40 mg) by mouth once daily., Disp: 90 tablet, Rfl: 3    ferrous gluconate 324 (38 Fe) mg tablet, Take 1 tablet (324 mg) by mouth 2 times a day., Disp: 180 tablet, Rfl: 3    furosemide (Lasix) 40 mg tablet, Take 1 tablet (40 mg) by mouth once daily., Disp: 90 tablet, Rfl: 3    levothyroxine (Synthroid, Levoxyl) 75 mcg tablet, Take 1 tablet (75 mcg) by mouth once daily in the morning., Disp: 90 tablet, Rfl: 3    oxybutynin XL (Ditropan XL) 5 mg 24 hr tablet, Take 1 every other day, Disp: 30 tablet, Rfl: 11    trospium (Sanctura) 20 mg tablet, Take 3 tablets (60 mg) by mouth 2 times a day. (Patient not taking: Reported on  2/25/2025), Disp: , Rfl:        Assessment and Plan     Ms. Boyd is a 80-year-old white female with past medical history of hypertension, hyperlipidemia, knee osteoarthritis, hypothyroidism, overactive bladder, asthma, remote history of GI bleed, heavy NSAID use, asthma and CKD who is coming today for advanced CKD follow-up     Impression     # Chronic kidney disease -G4-->5/A1 -progressive  - Baseline SCr 3-3.5, GFR 10-15 mL/min per 1.73 m²  - Most likely related to atherosclerotic cardiovascular disease and significant history of NSAID use  -Most recent ACR 60-80 mg/g  -Electrolytes: Acceptable potassium, albumin and no significant acidosis.  -Previous kidney U/S August 2021 showed shrunk right kidney 8.5 cm with increased echogenicity, left kidney 10.4 cm. No stones or masses  -Declined kidney transplant evaluation  -Not a good candidate for AV fistula. Arteriovenous graft as needed per vascular surgery, discussed this with pt.   -No immediate need to start dialysis.  Close follow-up.     #Urinary incontinence, new onset increased frequency   - preexisting, currently on trospium per urology.  -Oxybutynin was discontinued during last hospital admission April 2024 due to orthostatic hypotension  -No objection to Botox therapy from kidney standpoint-will defer to urology     #BLE edema, possible lymphedema  - Will continue Lasix 40 mg daily     # Blood Pressure  - Today BP is 138/82 Home blood pressure checks average 130/60  - Current meds: Furosemide 40 mg qd  - Instructed to check blood pressure at home     #Anemia  - Hgb 10-11-asymptomatic, improved 12-13  - Iron saturation 28% improved from prior   - Ferritin elevated  - Pt continues with PO iron BID.  -No indication for BERNADETTE     #BMD  - Calcium, phosphorus, albumin within reference values  -PTH is elevated at 100-150 indicating possible secondary hyperparathyroidism     # CVS-high risk  - On statins     #Uric Acid  - Previously unable to tolerate allopurinol  due to GI issues.   - Doing well on febuxostat (started 10/2021), most recent uric acid 3-4 mg/dl  - Continue current regimen     General CKD recommendations:  - Avoid NSAIDS  - Avoid contrast as possible. If needed, hold ACEi/ARB/diuretic 24 hours prior to exposure, ensure appropriate hydration. For inpatient, consider IVF prior to and after  - Diet: limit salt, avoid processed foods  - For nausea/vomiting/ ulcer protection, prefer avoid PPI, H2 blocker, sucralfate okay.  - Tobacco cessation     Follow up 6 months with repeat blood work and urine analysis, iron studies, CBC, RFP prior.          Laura Narayan MD, MS, TATYANA ROSARIO  Clinical  - [facility] [employer]  Nephrologist - [facility] Center - [facility]

## 2025-02-25 NOTE — PATIENT INSTRUCTIONS
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  Kidney function improved from 14% up to 19%-great job.  No need to start dialysis yet.  Will continue to monitor closely  #Hypertension: Blood pressure is in target at home-and no significant weight gain.  Continue  Lasix 40 mg.    #Uric acid is improved a lot-continue febuxostat once daily  #Leg swelling and accepted wound healing-improved.  Continue Lasix 40 mg daily.   #If you need dialysis we will get graft inserted-will let the vascular surgeon know when we needed  #Urine frequency and recent incontinence-continue to follow with urology.  Agree with Botox injection if needed  #Iron deficiency anemia-improved significantly-continue oral iron  # Normal vitamin D-no need for supplements        I will see you again in 6 months with another set of blood work prior to visit     Please call our office if you have any question  Thank you for coming to see me today     Laura Narayan MD, MS, TATYANA ROSARIO  Clinical  - [employer]  Nephrologist - [facility] - [facility]

## 2025-02-27 ENCOUNTER — APPOINTMENT (OUTPATIENT)
Dept: NEPHROLOGY | Facility: CLINIC | Age: 80
End: 2025-02-27
Payer: MEDICARE

## 2025-03-06 ENCOUNTER — APPOINTMENT (OUTPATIENT)
Dept: UROLOGY | Facility: CLINIC | Age: 80
End: 2025-03-06
Payer: MEDICARE

## 2025-03-06 DIAGNOSIS — N32.81 OAB (OVERACTIVE BLADDER): Primary | ICD-10-CM

## 2025-03-06 NOTE — PROGRESS NOTES
Virtual or Telephone Consent    An interactive audio and video telecommunication system which permits real time communications between the patient (at the originating site) and provider (at the distant site) was utilized to provide this telehealth service.   Verbal consent was requested and obtained from Magi Boyd on this date, 03/08/25 for a telehealth visit and the patient's location was confirmed at the time of the visit.    HISTORY OF PRESENT ILLNESS:  Magi Boyd is a 80 y.o. female who presents today for a virtual follow up visit. She states her kidneys have improved over the last 3 months. She is interested in botox.          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    Surgical History  She has no past surgical history on file.     Social History  She reports that she has never smoked. She has never used smokeless tobacco. She reports that she does not currently use alcohol. She reports that she does not use drugs.    Family History  Family History   Problem Relation Name Age of Onset    Heart disease Mother      Brain cancer Father      Lung cancer Father      Heart disease Brother      Heart disease Brother      Heart disease Brother      Heart disease Brother      Diabetes Other FAM HX         Allergies  Levofloxacin,  Linezolid, Nitrofurantoin monohyd/m-cryst, and Penicillins      A comprehensive 10+ review of systems was negative except for: see hpi                  Assessment:  80-year-old with recurrent UTIs and overactive bladder in the setting of stage IV kidney disease     Recurrent UTI :  completed fosfomycin  Standing culture ordered  cystoscopy was negative on 10/19/2020  Rx estradiol cream        OAB:  Failed oxybutynin and trospium, no improvement with vesicare      CKD stable      Wants to try botox       Follow up for botox         All questions and concerns were answered and addressed.  The patient expressed understanding and agrees with the plan.     Arsen Allan MD    Scribe Attestation  By signing my name below, I, Roya Almonte   attest that this documentation has been prepared under the direction and in the presence of Arsen Allan MD.

## 2025-03-20 ENCOUNTER — APPOINTMENT (OUTPATIENT)
Dept: PRIMARY CARE | Facility: CLINIC | Age: 80
End: 2025-03-20
Payer: MEDICARE

## 2025-03-20 VITALS — OXYGEN SATURATION: 93 % | HEART RATE: 100 BPM | SYSTOLIC BLOOD PRESSURE: 128 MMHG | DIASTOLIC BLOOD PRESSURE: 84 MMHG

## 2025-03-20 DIAGNOSIS — E03.9 HYPOTHYROIDISM, UNSPECIFIED TYPE: Primary | ICD-10-CM

## 2025-03-20 DIAGNOSIS — N18.4 STAGE 4 CHRONIC KIDNEY DISEASE (MULTI): ICD-10-CM

## 2025-03-20 DIAGNOSIS — J01.40 SUBACUTE PANSINUSITIS: ICD-10-CM

## 2025-03-20 PROCEDURE — 1160F RVW MEDS BY RX/DR IN RCRD: CPT | Performed by: NURSE PRACTITIONER

## 2025-03-20 PROCEDURE — 3079F DIAST BP 80-89 MM HG: CPT | Performed by: NURSE PRACTITIONER

## 2025-03-20 PROCEDURE — 1123F ACP DISCUSS/DSCN MKR DOCD: CPT | Performed by: NURSE PRACTITIONER

## 2025-03-20 PROCEDURE — 1158F ADVNC CARE PLAN TLK DOCD: CPT | Performed by: NURSE PRACTITIONER

## 2025-03-20 PROCEDURE — 1036F TOBACCO NON-USER: CPT | Performed by: NURSE PRACTITIONER

## 2025-03-20 PROCEDURE — 99214 OFFICE O/P EST MOD 30 MIN: CPT | Performed by: NURSE PRACTITIONER

## 2025-03-20 PROCEDURE — 3074F SYST BP LT 130 MM HG: CPT | Performed by: NURSE PRACTITIONER

## 2025-03-20 PROCEDURE — 1159F MED LIST DOCD IN RCRD: CPT | Performed by: NURSE PRACTITIONER

## 2025-03-20 RX ORDER — FLUTICASONE PROPIONATE 50 MCG
1 SPRAY, SUSPENSION (ML) NASAL DAILY
Qty: 16 G | Refills: 5 | Status: SHIPPED | OUTPATIENT
Start: 2025-03-20 | End: 2026-03-20

## 2025-03-20 NOTE — PATIENT INSTRUCTIONS
Boiron- oscillococcinum for prevention of a virus  Cold calm for when you actually sick with cold or sinus symptoms

## 2025-03-20 NOTE — PROGRESS NOTES
Subjective   Patient ID: Magi Boyd is a 80 y.o. female who presents for Follow-up (Patient is here today for a follow up. Patient would like to discuss ear pain due to nasal congestion. ).    80 year old female here for routine follow up. Accompanied by   Nasal congestion- cannot breathe through her nose. Does not know what meds she can take due to the CKD  HTN- monitors at home 120-130/70-82. In office bp was high but she was running late to get here so I rechecked and its at her baseline   concerned with her poor diet. Meals on wheels. Has soup. Needs to stay away from salt.   Urinary incont- may do botox but she is nervous  Hypothyroid- Euthyroid TSH.          Review of Systems    Objective   /84   Pulse 100   SpO2 93%     Physical Exam  Constitutional:       Appearance: Normal appearance. She is obese.   Cardiovascular:      Rate and Rhythm: Normal rate and regular rhythm.      Pulses: Normal pulses.      Heart sounds: Normal heart sounds.   Pulmonary:      Effort: Pulmonary effort is normal.      Breath sounds: Normal breath sounds.   Neurological:      General: No focal deficit present.      Mental Status: She is alert and oriented to person, place, and time.   Psychiatric:         Mood and Affect: Mood normal.         Behavior: Behavior normal.         Assessment/Plan   Diagnoses and all orders for this visit:  Hypothyroidism, unspecified type  Comments:  cont current dose T4  Orders:  -     TSH with reflex to Free T4 if abnormal; Future  Subacute pansinusitis  Comments:  flonase.  Orders:  -     fluticasone (Flonase) 50 mcg/actuation nasal spray; Administer 1 spray into each nostril once daily. Shake gently. Before first use, prime pump. After use, clean tip and replace cap.  Stage 4 chronic kidney disease (Multi)  Comments:  cont treatment with renal  Other orders  -     Follow Up In Primary Care - Established

## 2025-03-26 ENCOUNTER — APPOINTMENT (OUTPATIENT)
Dept: PODIATRY | Facility: CLINIC | Age: 80
End: 2025-03-26
Payer: MEDICARE

## 2025-04-01 ENCOUNTER — APPOINTMENT (OUTPATIENT)
Dept: UROLOGY | Facility: CLINIC | Age: 80
End: 2025-04-01
Payer: MEDICARE

## 2025-04-02 DIAGNOSIS — J45.909 UNCOMPLICATED ASTHMA, UNSPECIFIED ASTHMA SEVERITY, UNSPECIFIED WHETHER PERSISTENT (HHS-HCC): ICD-10-CM

## 2025-04-03 ENCOUNTER — APPOINTMENT (OUTPATIENT)
Dept: UROLOGY | Facility: CLINIC | Age: 80
End: 2025-04-03
Payer: MEDICARE

## 2025-04-03 RX ORDER — BUDESONIDE AND FORMOTEROL FUMARATE DIHYDRATE 160; 4.5 UG/1; UG/1
2 AEROSOL RESPIRATORY (INHALATION) DAILY
Qty: 10.2 G | Refills: 3 | Status: SHIPPED | OUTPATIENT
Start: 2025-04-03

## 2025-04-09 ENCOUNTER — APPOINTMENT (OUTPATIENT)
Dept: PODIATRY | Facility: CLINIC | Age: 80
End: 2025-04-09
Payer: MEDICARE

## 2025-04-09 DIAGNOSIS — B35.1 ONYCHOMYCOSIS: ICD-10-CM

## 2025-04-09 DIAGNOSIS — M79.671 PAIN IN BOTH FEET: Primary | ICD-10-CM

## 2025-04-09 DIAGNOSIS — I73.9 PAD (PERIPHERAL ARTERY DISEASE) (CMS-HCC): ICD-10-CM

## 2025-04-09 DIAGNOSIS — M79.671 RIGHT FOOT PAIN: ICD-10-CM

## 2025-04-09 DIAGNOSIS — M79.672 PAIN IN BOTH FEET: Primary | ICD-10-CM

## 2025-04-09 DIAGNOSIS — I73.9 PVD (PERIPHERAL VASCULAR DISEASE) (CMS-HCC): ICD-10-CM

## 2025-04-09 PROCEDURE — 1159F MED LIST DOCD IN RCRD: CPT | Performed by: PODIATRIST

## 2025-04-09 PROCEDURE — 1123F ACP DISCUSS/DSCN MKR DOCD: CPT | Performed by: PODIATRIST

## 2025-04-09 PROCEDURE — 1036F TOBACCO NON-USER: CPT | Performed by: PODIATRIST

## 2025-04-09 PROCEDURE — 1160F RVW MEDS BY RX/DR IN RCRD: CPT | Performed by: PODIATRIST

## 2025-04-09 PROCEDURE — 99213 OFFICE O/P EST LOW 20 MIN: CPT | Performed by: PODIATRIST

## 2025-04-09 NOTE — PROGRESS NOTES
History of Present Illness:   This 80 year old female  Presents for follow up pain  States the pain is greatly improved to heel  Denies trauma  No other pedal complaints  Unable to safely trim nails on own    Last visit Dec 2024    Past Medical History  Past Medical History:   Diagnosis Date    Acute UTI 04/20/2023    Adverse reaction to NSAIDs, sequela 04/20/2023    Benign essential hypertension 04/20/2023    Bilateral leg and foot pain 04/20/2023    Body mass index (BMI)40.0-44.9, adult 10/18/2021    Body mass index (BMI) of 40.0 to 44.9 in adult    CKD (chronic kidney disease) 04/20/2023    Disorder of both eustachian tubes 04/20/2023    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    GERD (gastroesophageal reflux disease) 04/20/2023    Lower urinary tract symptoms 04/20/2023    Obesity, unspecified 08/01/2022    Class 2 obesity with body mass index (BMI) of 39.0 to 39.9 in adult    Otalgia, left ear 02/18/2020    Earache, left    Other abnormal glucose 09/29/2016    Abnormal glucose    Other specified abnormal findings of blood chemistry 10/05/2016    Abnormal thyroid blood test    Other specified disorders of ear, unspecified ear 03/18/2015    Fullness in ear    Pelvic and perineal pain 10/24/2019    Pelvic pressure in female    Personal history of other diseases of the respiratory system 12/05/2013    History of acute bronchitis    Personal history of other diseases of the respiratory system 03/18/2015    History of acute sinusitis    Personal history of other diseases of the respiratory system 12/17/2019    History of upper respiratory infection    Personal history of other drug therapy 10/25/2019    History of influenza vaccination    Personal history of other endocrine, nutritional and metabolic disease     History of dehydration    Personal history of other specified conditions 09/26/2013    History of fatigue    Personal history of other specified conditions 10/29/2013    History of shortness of breath     Personal history of other specified conditions 02/18/2020    History of nasal congestion    Personal history of other specified conditions 02/18/2020    History of postnasal drip    Personal history of other specified conditions 02/09/2015    History of diarrhea    Personal history of urinary (tract) infections 12/18/2018    History of urinary tract infection    Persons encountering health services in other specified circumstances 10/25/2019    Encounter to establish care    Right knee pain 04/20/2023    Simple chronic bronchitis (CMS/HCC) 08/23/2023    SOB (shortness of breath) 08/23/2023    Toe pain 08/23/2023    Unspecified abnormal findings in urine 08/25/2021    Abnormal urinalysis    Urinary tract infection, site not specified 10/18/2021    Acute lower UTI    Urinary tract infection, site not specified 08/30/2022    Acute UTI       Medications and Allergies have been reviewed.    Review Of Systems:  GENERAL: No weight loss, malaise or fevers.  HEENT: Negative for frequent or significant headaches,   RESPIRATORY: Negative for cough, wheezing or shortness of breath.  CARDIOVASCULAR: Negative for chest pain, leg swelling or palpitations.    Physical Exam:  Vascular exam: Dorsalis pedis and Posterior Tibial pulses palpable 2/4 bilateral. Capillary refill time less than 3 seconds b/l. Hair growth noted to digits. +2 b/l LE edema noted. Skin temp warm to warm from proximal to distal b/l. + varicosities noted.     Neurologic exam: Gross sensation present b/l. No neuro deficits noted b/l      Musculoskeletal exam: Muscle strength 5/5 for all major muscle groups tested in the lower extremity b/l. No gross deformities noted b/l. Pain to right plantar and post heel . NO Soi noted. However tender with palpation     Dermatologic exam:  Webspaces 1-4 b/l are clean, dry and intact.  Nails thick and discolored and elongated    1. Pain in both feet        2. Onychomycosis        3. PVD (peripheral vascular disease) (CMS-[facility])         4. Edema, unspecified type        5. PAD (peripheral artery disease) (CMS-HCC)          Patient exam and eval  Pleased with progress  Continue to closely monitor heels  Debrided nails  No open wounds at this time  Fu 3 mos  Sooner if any new prob arise.   Pt and hub in agreement to plan    Patient higher risk due to pedal infection she has had in past. This is a chronic and recurring condition    Kirstie Martino DPM  [phone]  [facility] [address]  Fax: [phone]

## 2025-04-24 ENCOUNTER — TELEPHONE (OUTPATIENT)
Dept: NEPHROLOGY | Facility: CLINIC | Age: 80
End: 2025-04-24
Payer: MEDICARE

## 2025-04-24 NOTE — PROGRESS NOTES
Pt. Called, reports foot swelling and somewhat up the legs, right worse than left. She is going to try to send photo through my chart. Please advise.  -MO

## 2025-04-28 ENCOUNTER — APPOINTMENT (OUTPATIENT)
Dept: UROLOGY | Facility: CLINIC | Age: 80
End: 2025-04-28
Payer: MEDICARE

## 2025-04-29 ENCOUNTER — APPOINTMENT (OUTPATIENT)
Dept: UROLOGY | Facility: CLINIC | Age: 80
End: 2025-04-29
Payer: MEDICARE

## 2025-05-01 ENCOUNTER — APPOINTMENT (OUTPATIENT)
Dept: UROLOGY | Facility: CLINIC | Age: 80
End: 2025-05-01
Payer: MEDICARE

## 2025-05-12 ENCOUNTER — APPOINTMENT (OUTPATIENT)
Dept: UROLOGY | Facility: CLINIC | Age: 80
End: 2025-05-12
Payer: MEDICARE

## 2025-06-29 DIAGNOSIS — E03.9 HYPOTHYROIDISM, UNSPECIFIED TYPE: ICD-10-CM

## 2025-06-30 RX ORDER — LEVOTHYROXINE SODIUM 75 UG/1
75 TABLET ORAL
Qty: 90 TABLET | Refills: 1 | Status: SHIPPED | OUTPATIENT
Start: 2025-06-30

## 2025-08-06 ENCOUNTER — APPOINTMENT (OUTPATIENT)
Dept: PODIATRY | Facility: CLINIC | Age: 80
End: 2025-08-06
Payer: MEDICARE

## 2025-08-06 DIAGNOSIS — B35.1 ONYCHOMYCOSIS: ICD-10-CM

## 2025-08-06 DIAGNOSIS — I73.9 PAD (PERIPHERAL ARTERY DISEASE): ICD-10-CM

## 2025-08-06 DIAGNOSIS — I73.9 PVD (PERIPHERAL VASCULAR DISEASE): ICD-10-CM

## 2025-08-06 DIAGNOSIS — M79.671 PAIN IN BOTH FEET: Primary | ICD-10-CM

## 2025-08-06 DIAGNOSIS — R60.9 EDEMA, UNSPECIFIED TYPE: ICD-10-CM

## 2025-08-06 DIAGNOSIS — M79.672 PAIN IN BOTH FEET: Primary | ICD-10-CM

## 2025-08-06 PROCEDURE — 1160F RVW MEDS BY RX/DR IN RCRD: CPT | Performed by: PODIATRIST

## 2025-08-06 PROCEDURE — 1159F MED LIST DOCD IN RCRD: CPT | Performed by: PODIATRIST

## 2025-08-06 PROCEDURE — 99213 OFFICE O/P EST LOW 20 MIN: CPT | Performed by: PODIATRIST

## 2025-08-06 PROCEDURE — 1036F TOBACCO NON-USER: CPT | Performed by: PODIATRIST

## 2025-08-06 NOTE — PROGRESS NOTES
History of Present Illness:   This 80 year old female  Presents for follow up pain  States the pain is greatly improved to heel - no concerns noted  Denies trauma  No other pedal complaints  Unable to safely trim nails on own    Last visit April 2025    Past Medical History  Past Medical History:   Diagnosis Date    Acute UTI 04/20/2023    Adverse reaction to NSAIDs, sequela 04/20/2023    Benign essential hypertension 04/20/2023    Bilateral leg and foot pain 04/20/2023    Body mass index (BMI)40.0-44.9, adult 10/18/2021    Body mass index (BMI) of 40.0 to 44.9 in adult    CKD (chronic kidney disease) 04/20/2023    Disorder of both eustachian tubes 04/20/2023    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    GERD (gastroesophageal reflux disease) 04/20/2023    Lower urinary tract symptoms 04/20/2023    Obesity, unspecified 08/01/2022    Class 2 obesity with body mass index (BMI) of 39.0 to 39.9 in adult    Otalgia, left ear 02/18/2020    Earache, left    Other abnormal glucose 09/29/2016    Abnormal glucose    Other specified abnormal findings of blood chemistry 10/05/2016    Abnormal thyroid blood test    Other specified disorders of ear, unspecified ear 03/18/2015    Fullness in ear    Pelvic and perineal pain 10/24/2019    Pelvic pressure in female    Personal history of other diseases of the respiratory system 12/05/2013    History of acute bronchitis    Personal history of other diseases of the respiratory system 03/18/2015    History of acute sinusitis    Personal history of other diseases of the respiratory system 12/17/2019    History of upper respiratory infection    Personal history of other drug therapy 10/25/2019    History of influenza vaccination    Personal history of other endocrine, nutritional and metabolic disease     History of dehydration    Personal history of other specified conditions 09/26/2013    History of fatigue    Personal history of other specified conditions 10/29/2013    History of  shortness of breath    Personal history of other specified conditions 02/18/2020    History of nasal congestion    Personal history of other specified conditions 02/18/2020    History of postnasal drip    Personal history of other specified conditions 02/09/2015    History of diarrhea    Personal history of urinary (tract) infections 12/18/2018    History of urinary tract infection    Persons encountering health services in other specified circumstances 10/25/2019    Encounter to establish care    Right knee pain 04/20/2023    Simple chronic bronchitis (CMS/HCC) 08/23/2023    SOB (shortness of breath) 08/23/2023    Toe pain 08/23/2023    Unspecified abnormal findings in urine 08/25/2021    Abnormal urinalysis    Urinary tract infection, site not specified 10/18/2021    Acute lower UTI    Urinary tract infection, site not specified 08/30/2022    Acute UTI       Medications and Allergies have been reviewed.    Review Of Systems:  GENERAL: No weight loss, malaise or fevers.  HEENT: Negative for frequent or significant headaches,   RESPIRATORY: Negative for cough, wheezing or shortness of breath.  CARDIOVASCULAR: Negative for chest pain, leg swelling or palpitations.    Physical Exam:  Vascular exam: Dorsalis pedis and Posterior Tibial pulses palpable 2/4 bilateral. Capillary refill time less than 3 seconds b/l. Hair growth noted to digits. +2 b/l LE edema noted. Skin temp warm to warm from proximal to distal b/l. + varicosities noted.     Neurologic exam: Gross sensation present b/l. No neuro deficits noted b/l      Musculoskeletal exam: Muscle strength 5/5 for all major muscle groups tested in the lower extremity b/l. No gross deformities noted b/l. Pain to right plantar and post heel . NO Soi noted. However tender with palpation     Dermatologic exam:  Webspaces 1-4 b/l are clean, dry and intact.  Nails thick and discolored and elongated    1. Pain in both feet        2. Onychomycosis        3. PVD (peripheral  vascular disease) (CMS-HCC)        4. Edema, unspecified type        5. PAD (peripheral artery disease) (CMS-[facility])          Patient exam and eval  Pleased with progress  Continue to closely monitor heels  Debrided nails  No open wounds at this time  Fu 3 mos  Sooner if any new prob arise.   Pt and hub in agreement to plan    Patient higher risk due to pedal infection she has had in past. This is a chronic and recurring condition    PCP Naomy Murillo, last visit 3/20/2025    Kirstie Martino DPM  [phone]  [facility] [address]  Fax: [phone]

## 2025-08-25 DIAGNOSIS — N18.4 BENIGN HYPERTENSION WITH CKD (CHRONIC KIDNEY DISEASE) STAGE IV (MULTI): ICD-10-CM

## 2025-08-25 DIAGNOSIS — I89.0 LYMPHEDEMA: ICD-10-CM

## 2025-08-25 DIAGNOSIS — I12.9 BENIGN HYPERTENSION WITH CKD (CHRONIC KIDNEY DISEASE) STAGE IV (MULTI): ICD-10-CM

## 2025-08-25 DIAGNOSIS — R60.0 LOCALIZED EDEMA: ICD-10-CM

## 2025-08-25 DIAGNOSIS — M10.00 IDIOPATHIC GOUT, UNSPECIFIED CHRONICITY, UNSPECIFIED SITE: ICD-10-CM

## 2025-08-25 DIAGNOSIS — N18.4 STAGE 4 CHRONIC KIDNEY DISEASE (MULTI): ICD-10-CM

## 2025-08-25 DIAGNOSIS — N18.5 CKD (CHRONIC KIDNEY DISEASE), STAGE V (MULTI): ICD-10-CM

## 2025-08-27 LAB — TSH SERPL-ACNC: 3.33 MIU/L (ref 0.4–4.5)

## 2025-08-28 LAB
25(OH)D3+25(OH)D2 SERPL-MCNC: 46 NG/ML (ref 30–100)
ALBUMIN SERPL-MCNC: 3.9 G/DL (ref 3.6–5.1)
ALBUMIN/CREAT UR: 41 MG/G CREAT
BUN SERPL-MCNC: 40 MG/DL (ref 7–25)
BUN/CREAT SERPL: 16 (CALC) (ref 6–22)
CALCIUM SERPL-MCNC: 9.7 MG/DL (ref 8.6–10.4)
CHLORIDE SERPL-SCNC: 103 MMOL/L (ref 98–110)
CO2 SERPL-SCNC: 27 MMOL/L (ref 20–32)
CREAT SERPL-MCNC: 2.47 MG/DL (ref 0.6–1)
CREAT UR-MCNC: 46 MG/DL (ref 20–275)
EGFRCR SERPLBLD CKD-EPI 2021: 19 ML/MIN/1.73M2
ERYTHROCYTE [DISTWIDTH] IN BLOOD BY AUTOMATED COUNT: 12.4 % (ref 11–15)
FERRITIN SERPL-MCNC: 109 NG/ML (ref 16–288)
GLUCOSE SERPL-MCNC: 105 MG/DL (ref 65–99)
HCT VFR BLD AUTO: 36.9 % (ref 35–45)
HGB BLD-MCNC: 12 G/DL (ref 11.7–15.5)
IRON SATN MFR SERPL: 30 % (CALC) (ref 16–45)
IRON SERPL-MCNC: 87 MCG/DL (ref 45–160)
MCH RBC QN AUTO: 31.6 PG (ref 27–33)
MCHC RBC AUTO-ENTMCNC: 32.5 G/DL (ref 32–36)
MCV RBC AUTO: 97.1 FL (ref 80–100)
MICROALBUMIN UR-MCNC: 1.9 MG/DL
PHOSPHATE SERPL-MCNC: 3.6 MG/DL (ref 2.1–4.3)
PLATELET # BLD AUTO: 160 THOUSAND/UL (ref 140–400)
PMV BLD REES-ECKER: 9.9 FL (ref 7.5–12.5)
POTASSIUM SERPL-SCNC: 4.1 MMOL/L (ref 3.5–5.3)
PTH-INTACT SERPL-MCNC: 164 PG/ML (ref 16–77)
RBC # BLD AUTO: 3.8 MILLION/UL (ref 3.8–5.1)
SODIUM SERPL-SCNC: 139 MMOL/L (ref 135–146)
TIBC SERPL-MCNC: 293 MCG/DL (CALC) (ref 250–450)
URATE SERPL-MCNC: 4.9 MG/DL (ref 2.5–7)
WBC # BLD AUTO: 4.7 THOUSAND/UL (ref 3.8–10.8)

## 2025-09-03 ENCOUNTER — APPOINTMENT (OUTPATIENT)
Dept: NEPHROLOGY | Facility: CLINIC | Age: 80
End: 2025-09-03
Payer: MEDICARE

## 2025-09-23 ENCOUNTER — APPOINTMENT (OUTPATIENT)
Dept: PRIMARY CARE | Facility: CLINIC | Age: 80
End: 2025-09-23
Payer: MEDICARE

## 2025-09-30 ENCOUNTER — APPOINTMENT (OUTPATIENT)
Dept: PRIMARY CARE | Facility: CLINIC | Age: 80
End: 2025-09-30
Payer: MEDICARE

## 2025-12-10 ENCOUNTER — APPOINTMENT (OUTPATIENT)
Dept: PODIATRY | Facility: CLINIC | Age: 80
End: 2025-12-10
Payer: MEDICARE

## 2026-04-28 ENCOUNTER — APPOINTMENT (OUTPATIENT)
Dept: NEPHROLOGY | Facility: CLINIC | Age: 81
End: 2026-04-28
Payer: MEDICARE